# Patient Record
Sex: FEMALE | Race: BLACK OR AFRICAN AMERICAN | Employment: OTHER | URBAN - METROPOLITAN AREA
[De-identification: names, ages, dates, MRNs, and addresses within clinical notes are randomized per-mention and may not be internally consistent; named-entity substitution may affect disease eponyms.]

---

## 2019-07-26 ENCOUNTER — OFFICE VISIT (OUTPATIENT)
Dept: FAMILY MEDICINE CLINIC | Age: 64
End: 2019-07-26

## 2019-07-26 VITALS
TEMPERATURE: 97.6 F | OXYGEN SATURATION: 98 % | DIASTOLIC BLOOD PRESSURE: 81 MMHG | HEART RATE: 89 BPM | SYSTOLIC BLOOD PRESSURE: 147 MMHG | RESPIRATION RATE: 22 BRPM | BODY MASS INDEX: 43.23 KG/M2 | HEIGHT: 63 IN | WEIGHT: 244 LBS

## 2019-07-26 DIAGNOSIS — Z13.1 SCREENING FOR DIABETES MELLITUS: ICD-10-CM

## 2019-07-26 DIAGNOSIS — J45.20 MILD INTERMITTENT ASTHMA WITHOUT COMPLICATION: Primary | ICD-10-CM

## 2019-07-26 DIAGNOSIS — L83 ACANTHOSIS NIGRICANS: ICD-10-CM

## 2019-07-26 DIAGNOSIS — E11.65 TYPE 2 DIABETES MELLITUS WITH HYPERGLYCEMIA, WITHOUT LONG-TERM CURRENT USE OF INSULIN (HCC): ICD-10-CM

## 2019-07-26 DIAGNOSIS — E78.2 MIXED HYPERLIPIDEMIA: ICD-10-CM

## 2019-07-26 DIAGNOSIS — E66.01 OBESITY, CLASS III, BMI 40-49.9 (MORBID OBESITY) (HCC): ICD-10-CM

## 2019-07-26 LAB — HBA1C MFR BLD HPLC: 7.2 %

## 2019-07-26 RX ORDER — ALBUTEROL SULFATE 90 UG/1
2 AEROSOL, METERED RESPIRATORY (INHALATION)
Qty: 1 INHALER | Refills: 5 | Status: SHIPPED | OUTPATIENT
Start: 2019-07-26 | End: 2020-07-20

## 2019-07-26 RX ORDER — ROSUVASTATIN CALCIUM 5 MG/1
5 TABLET, COATED ORAL
Qty: 90 TAB | Refills: 1 | Status: SHIPPED | OUTPATIENT
Start: 2019-07-26

## 2019-07-26 RX ORDER — METFORMIN HYDROCHLORIDE 500 MG/1
500 TABLET ORAL 2 TIMES DAILY WITH MEALS
Qty: 60 TAB | Refills: 2 | Status: SHIPPED | OUTPATIENT
Start: 2019-07-26

## 2019-07-26 NOTE — PROGRESS NOTES
Chief Complaint   Patient presents with   [address]     PCP     she is a 58y.o. year old female who presents for evalution. She is here to establish care    She has a h/o asthma, she has episodes of wheezing once a month- but she denies having asthma  She says she had a TIA, she takes 2 aspirin a day as a result  She had trouble with statins except crestor  crestor was denied by insurance and she never got back on a statin  She has a CXR that shows plaque in the aorta  Also since kvng had the TIA there is plaque in the brain as well\she has refused to use statins in the past    Reviewed PmHx, RxHx, FmHx, SocHx, AllgHx and updated and dated in the chart.     Aspirin yes ____   No____ N/A____    Patient Active Problem List    Diagnosis    Obesity, morbid ([facility] [address]       Nurse notes were reviewed and copied and are correct  Review of Systems - negative except as listed above in the HPI    Objective:     Vitals:    07/26/19 0935 07/26/19 1025 07/26/19 1026   BP: 162/86 147/81 147/81   Pulse: 89     Resp: 22     Temp: 97.6 °F (36.4 °C)     TempSrc: Oral     SpO2: 98%     Weight: 244 lb (110.7 kg)     Height: 5' 2.5\" (1.588 m)         Physical Examination: General appearance - alert, well appearing, and in no distress  Mental status - alert, oriented to person, place, and time  Eyes - pupils equal and reactive, extraocular eye movements intact  Ears - bilateral TM's and external ear canals normal  Mouth - mucous membranes moist, pharynx normal without lesions  Neck - supple, no significant adenopathy  Chest - clear to auscultation, no wheezes, rales or rhonchi, symmetric air entry  Heart - normal rate, regular rhythm, normal S1, S2, no murmurs, rubs, clicks or gallops  Abdomen - soft, nontender, nondistended, no masses or organomegaly  Musculoskeletal - no joint tenderness, deformity or swelling  Extremities - peripheral pulses normal, no pedal edema, no clubbing or cyanosis  Skin - normal coloration and turgor, no rashes, no suspicious skin lesions noted      Assessment/ Plan:   Diagnoses and all orders for this visit:    1. Mild intermittent asthma without complication  -     albuterol (PROVENTIL HFA, VENTOLIN HFA, PROAIR HFA) 90 mcg/actuation inhaler; Take 2 Puffs by inhalation every four (4) hours as needed for Wheezing for up to 360 days. She is resistant to the diagnosis of astma although the history she gives of recurrent wheeezing reversed by bronchodilators  2. Obesity, Class III, BMI 40-49.9 (morbid obesity) ([facility])  -     METABOLIC PANEL, COMPREHENSIVE  I counseled for more than 50% of this more than 50 min vasit  3. Screening for diabetes mellitus  -     AMB POC HEMOGLOBIN A1C    4. Acanthosis nigricans  -     AMB POC HEMOGLOBIN A1C    5. Mixed hyperlipidemia  -     LIPID PANEL  -     rosuvastatin (CRESTOR) 5 mg tablet; Take 1 Tab by mouth nightly. 6. Type 2 diabetes mellitus with hyperglycemia, without long-term current use of insulin ([facility])  -     metFORMIN (GLUCOPHAGE) 500 mg tablet; Take 1 Tab by mouth two (2) times daily (with meals). After getting the result of a1c she admits that  ewas told in the past she needed metformin but she never took it  She agrees to take it now  Other orders  -     CVD REPORT     also exercise 150 mins a week  Avoid sweets and starches  Recheck after vacation  She was told in the past to take metformin but she does not acknowlede any knowledge of having diabetes    Follow-up and Dispositions    · Return in about 2 weeks (around 8/9/2019). ICD-10-CM ICD-9-CM    1. Mild intermittent asthma without complication J35.15 831.03 albuterol (PROVENTIL HFA, VENTOLIN HFA, PROAIR HFA) 90 mcg/actuation inhaler   2. Obesity, Class III, BMI 40-49.9 (morbid obesity) ([facility]) R75.30 343.56 METABOLIC PANEL, COMPREHENSIVE   3. Screening for diabetes mellitus Z13.1 V77.1 AMB POC HEMOGLOBIN A1C   4. Acanthosis nigricans L83 701.2 AMB POC HEMOGLOBIN A1C   5.  Mixed hyperlipidemia E78.2 272.2 LIPID PANEL      rosuvastatin (CRESTOR) 5 mg tablet   6. Type 2 diabetes mellitus with hyperglycemia, without long-term current use of insulin (HCC) E11.65 250.00 metFORMIN (GLUCOPHAGE) 500 mg tablet     790.29        I have discussed the diagnosis with the patient and the intended plan as seen in the above orders. The patient has received an after-visit summary and questions were answered concerning future plans. Medication Side Effects and Warnings were discussed with patient: yes  Patient Labs were reviewed and or requested: yes  Patient Past Records were reviewed and or requested: yes        There are no Patient Instructions on file for this visit.     The patient verbalizes understanding and agrees with the plan of care        Patient has the advanced directives booklet to review

## 2019-07-26 NOTE — PROGRESS NOTES
1. Have you been to the ER, urgent care clinic since your last visit? Hospitalized since your last visit? No    2. Have you seen or consulted any other health care providers outside of the [address] [place] [address] since your last visit? Include any pap smears or colon screening.  PCP      Chief Complaint   Patient presents with   [address]     PCP

## 2019-07-27 LAB
ALBUMIN SERPL-MCNC: 4.3 G/DL (ref 3.6–4.8)
ALBUMIN/GLOB SERPL: 1.3 {RATIO} (ref 1.2–2.2)
ALP SERPL-CCNC: 174 IU/L (ref 39–117)
ALT SERPL-CCNC: 25 IU/L (ref 0–32)
AST SERPL-CCNC: 18 IU/L (ref 0–40)
BILIRUB SERPL-MCNC: 0.2 MG/DL (ref 0–1.2)
BUN SERPL-MCNC: 10 MG/DL (ref 8–27)
BUN/CREAT SERPL: 13 (ref 12–28)
CALCIUM SERPL-MCNC: 9.7 MG/DL (ref 8.7–10.3)
CHLORIDE SERPL-SCNC: 99 MMOL/L (ref 96–106)
CHOLEST SERPL-MCNC: 271 MG/DL (ref 100–199)
CO2 SERPL-SCNC: 26 MMOL/L (ref 20–29)
CREAT SERPL-MCNC: 0.75 MG/DL (ref 0.57–1)
GLOBULIN SER CALC-MCNC: 3.3 G/DL (ref 1.5–4.5)
GLUCOSE SERPL-MCNC: 151 MG/DL (ref 65–99)
HDLC SERPL-MCNC: 45 MG/DL
INTERPRETATION, 910389: NORMAL
LDLC SERPL CALC-MCNC: 203 MG/DL (ref 0–99)
POTASSIUM SERPL-SCNC: 4.8 MMOL/L (ref 3.5–5.2)
PROT SERPL-MCNC: 7.6 G/DL (ref 6–8.5)
SODIUM SERPL-SCNC: 139 MMOL/L (ref 134–144)
TRIGL SERPL-MCNC: 116 MG/DL (ref 0–149)
VLDLC SERPL CALC-MCNC: 23 MG/DL (ref 5–40)

## 2019-08-28 ENCOUNTER — OFFICE VISIT (OUTPATIENT)
Dept: FAMILY MEDICINE CLINIC | Age: 64
End: 2019-08-28

## 2019-08-28 VITALS
HEIGHT: 63 IN | DIASTOLIC BLOOD PRESSURE: 82 MMHG | HEART RATE: 93 BPM | RESPIRATION RATE: 19 BRPM | TEMPERATURE: 97.9 F | OXYGEN SATURATION: 96 % | WEIGHT: 232.9 LBS | BODY MASS INDEX: 41.27 KG/M2 | SYSTOLIC BLOOD PRESSURE: 136 MMHG

## 2019-08-28 DIAGNOSIS — E78.2 MIXED HYPERLIPIDEMIA: ICD-10-CM

## 2019-08-28 DIAGNOSIS — J45.20 MILD INTERMITTENT ASTHMA WITHOUT COMPLICATION: ICD-10-CM

## 2019-08-28 DIAGNOSIS — J45.20 MILD INTERMITTENT ASTHMA WITHOUT COMPLICATION: Primary | ICD-10-CM

## 2019-08-28 DIAGNOSIS — E11.65 TYPE 2 DIABETES MELLITUS WITH HYPERGLYCEMIA, WITHOUT LONG-TERM CURRENT USE OF INSULIN (HCC): ICD-10-CM

## 2019-08-28 DIAGNOSIS — E66.01 OBESITY, CLASS III, BMI 40-49.9 (MORBID OBESITY) (HCC): ICD-10-CM

## 2019-08-28 RX ORDER — ACETAMINOPHEN 325 MG/1
TABLET ORAL
COMMUNITY

## 2019-08-28 RX ORDER — ALBUTEROL SULFATE 90 UG/1
2 AEROSOL, METERED RESPIRATORY (INHALATION)
Qty: 1 INHALER | Refills: 5 | OUTPATIENT
Start: 2019-08-28 | End: 2020-08-22

## 2019-08-28 NOTE — PROGRESS NOTES
Weight Loss Progress Note: Initial Physician Visit      Tavon Kramer is a 71 y.o. female with BMI   who is here for her Initial Evaluation for the medical bariatric care. CC: I want to be healthier    She has been going to [facility] and exercise on bbike 3 days a week  She is also a diabetic, a1c a month ago was 7.2  Taking metformin now          Weight History  Current weight  232and BMI is Body mass index is 41.92 kg/m². Goal weight bmi 29  Highest weight 244  (See weight gain time line scanned into media section of chart)    Weight loss History  How many weight loss attempts have you had? Which program were you most successful doing? Significant Medical History    Have you ever taken appetite suppressants? no   If yes: Rx or OTC? If yes; Any negative side effects? Ever diagnosed with sleep apnea or put on CPAP no    Ever had bariatric surgery: no    Pregnant or planning on becoming pregnant w/in 6 months: no        Significant Psychosocial History   Any history of drug abuse or dependence: no  Current Major Lifestyle Changes: no  Any potential unsupportive people: no      History of binge eating disorder or anorexia : no   If yes, are you currently being treated no    Social History  Social History     Tobacco Use    Smoking status: Former Smoker     Types: Cigarettes     Last attempt to quit: 7/2/2004     Years since quitting: 15.1    Smokeless tobacco: Never Used   Substance Use Topics    Alcohol use: No     How many times a week do you eat out?  0    Do you drink any EtOH?  no   If so, how much? Do you have upcoming any travel in the next 6 weeks?  no   If so, what do you have planned? Exercise  How many days a week do you currently exercise?   3 days  Have you ever been told by a physician not to exercise?  no      Objective  Visit Vitals  /81   Pulse 93   Temp 97.9 °F (36.6 °C) (Oral)   Resp 19   Ht 5' 2.5\" (1.588 m)   Wt 232 lb 14.4 oz (105.6 kg)   SpO2 96%   BMI 41.92 kg/m²       Waist Circumference: See Weight Management Doc Flowsheet  Neck Circumference: See Weight Management Doc Flowsheet  Percent Body Fat: See Weight Management Doc Flowsheet  Weight Metrics 8/28/2019 8/28/2019 7/26/2019 12/14/2015 7/10/2012 6/6/2012 4/4/2011   Weight - 232 lb 14.4 oz 244 lb 220 lb 194 lb 192 lb 210 lb   Neck Circ (inches) 16 - - - - - -   Waist Measure Inches 48 - - - - - -   BMI - 41.92 kg/m2 43.92 kg/m2 39.57 kg/m2 34.9 kg/m2 35.11 kg/m2 38.40 kg/m2         Labs:     Review of Systems  Complete ROS negative except where noted above      Physical Exam    Vital Signs Reviewed  Weight Management Metrics Reviewed    Vital Signs Reviewed  Appearance: Obese, A&O x 3, NAD  HEENT:  NC/AT, EOMI, PERRL, No scleral icterus, malampatti score:  Skin:    Skin tags - yes   Acanthosis Nigricans - no  Neck:  No nodes, thyromegaly no  Heart:  RRR without M/R/G  Lungs:  CTAB, no rhonchi, rales, or wheezes with good air exchange   Abdomen:  Non-tender, pos bowel sounds; hepatomegaly - no  Ext:  No C/C/E        Assessment & Plan  Diagnoses and all orders for this visit:    1. Mild intermittent asthma without complication  stable  2. Obesity, Class III, BMI 40-49.9 (morbid obesity) (HCC)  Diet Plan: given LCD low carb      Activity Plan: cont 3 days of cycling     Medication Plan no changes  3. Mixed hyperlipidemia  Cont the crestor, taking it every other day. Daily doses makes her back hurt  4. Type 2 diabetes mellitus with hyperglycemia, without long-term current use of insulin (HCC)  She is getting a glucose monitor.  She says she can get it covered by [employer] and does not need a RX      Based on his history and exam, Cyrus Ortiz is a good candidate for the Non-[facility] Weight Loss Program           Patient Instructions   LOW ROSANNE DIET     Drink 64 ounces of water each day  Exercise at least 150 mins a week    Breakfast  Either a premier protein meal replacement( 30 g of protein) or 2 boiled eggs  And 1 piece of fruit( apple, orange, banana, grapefruit or pear)      Lunch  Either a premier protein drink (30 G ) or 3 ounces of lean meat and 2 servings of any leafy green vegetables. Can also have as 1 option for vegetables perez beans or green peas. AND 1 piece of fruit as listed above    Snack: premier protein drink    Dinner  4 ounces of lean meat with 2 servings of vegetables ( as listed above)  Also may have a small-medium baked sweet potato    For tea or coffee use stevia sweetener            Over 50% of the 30 minutes face to face time with Vaughn Darnell consisted of counseling & coordinating and/or discussing treatment plans in reference to her obesity The primary encounter diagnosis was Mild intermittent asthma without complication. Diagnoses of Obesity, Class III, BMI 40-49.9 (morbid obesity) ([address] Mixed hyperlipidemia, and Type 2 diabetes mellitus with hyperglycemia, without long-term current use of insulin ([address] were also pertinent to this visit.

## 2019-08-28 NOTE — PATIENT INSTRUCTIONS
LOW ROSANNE DIET     Drink 64 ounces of water each day  Exercise at least 150 mins a week    Breakfast  Either a premier protein meal replacement( 30 g of protein) or 2 boiled eggs  And 1 piece of fruit( apple, orange, banana, grapefruit or pear)      Lunch  Either a premier protein drink (30 G ) or 3 ounces of lean meat and 2 servings of any leafy green vegetables. Can also have as 1 option for vegetables perez beans or green peas.   AND 1 piece of fruit as listed above    Snack: premier protein drink    Dinner  4 ounces of lean meat with 2 servings of vegetables ( as listed above)  Also may have a small-medium baked sweet potato    For tea or coffee use stevia sweetener

## 2019-08-28 NOTE — PROGRESS NOTES
1. Have you been to the ER, urgent care clinic since your last visit? Hospitalized since your last visit? ER in [facility] for back pain. 2. Have you seen or consulted any other health care providers outside of the [address] [place] [address] since your last visit? Include any pap smears or colon screening.  No     Chief Complaint   Patient presents with    Weight Management     Body Weight: 232.9  Body Fat%: 47.1  Muscle Mass Weight: 41.9  Body Water Weight: 96.6  Basal Metabolic Rate: 3508  BMI: 41.92

## 2019-08-30 RX ORDER — PHENOL/SODIUM PHENOLATE
20 AEROSOL, SPRAY (ML) MUCOUS MEMBRANE DAILY
Qty: 90 TAB | Refills: 3 | Status: SHIPPED | OUTPATIENT
Start: 2019-08-30

## 2019-09-26 ENCOUNTER — TELEPHONE (OUTPATIENT)
Dept: FAMILY MEDICINE CLINIC | Age: 64
End: 2019-09-26

## 2019-10-30 ENCOUNTER — OFFICE VISIT (OUTPATIENT)
Dept: FAMILY MEDICINE CLINIC | Age: 64
End: 2019-10-30

## 2019-10-30 VITALS
DIASTOLIC BLOOD PRESSURE: 72 MMHG | OXYGEN SATURATION: 97 % | HEIGHT: 63 IN | BODY MASS INDEX: 40.08 KG/M2 | TEMPERATURE: 97.7 F | RESPIRATION RATE: 20 BRPM | SYSTOLIC BLOOD PRESSURE: 122 MMHG | WEIGHT: 226.2 LBS | HEART RATE: 80 BPM

## 2019-10-30 DIAGNOSIS — Z11.59 NEED FOR HEPATITIS C SCREENING TEST: ICD-10-CM

## 2019-10-30 DIAGNOSIS — Z23 ENCOUNTER FOR IMMUNIZATION: ICD-10-CM

## 2019-10-30 DIAGNOSIS — E11.65 TYPE 2 DIABETES MELLITUS WITH HYPERGLYCEMIA, WITHOUT LONG-TERM CURRENT USE OF INSULIN (HCC): Primary | ICD-10-CM

## 2019-10-30 DIAGNOSIS — E55.9 VITAMIN D DEFICIENCY: ICD-10-CM

## 2019-10-30 DIAGNOSIS — E66.01 OBESITY, CLASS III, BMI 40-49.9 (MORBID OBESITY) (HCC): ICD-10-CM

## 2019-10-30 DIAGNOSIS — E78.2 MIXED HYPERLIPIDEMIA: ICD-10-CM

## 2019-10-30 NOTE — PROGRESS NOTES
1. Have you been to the ER, urgent care clinic since your last visit? Hospitalized since your last visit? No    2. Have you seen or consulted any other health care providers outside of the [address] [place] [address] since your last visit? Include any pap smears or colon screening.  Theapist    Chief Complaint   Patient presents with    Weight Management     Body Weight: 226.2  Body Fat%: 46.5  Muscle Mass Weight: 40.7  Body Water Weight: 60.8  Basal Metabolic Rate: 1096  BMI: 40.71

## 2019-10-30 NOTE — PROGRESS NOTES
Chief Complaint   Patient presents with    Weight Management     she is a 71y.o. year old female who presents for evalution. Here actually for the diabetes check  She also says her back hurts and thinks she needs to see ottho    She has reduced th sweet drinks. She has also decreased other sweets      Reviewed PmHx, RxHx, FmHx, SocHx, AllgHx and updated and dated in the chart. Aspirin yes ____   No____ N/A____    Patient Active Problem List    Diagnosis    Obesity, morbid ([facility] [address]       Nurse notes were reviewed and copied and are correct  Review of Systems - negative except as listed above in the HPI    Objective:     Vitals:    10/30/19 1105   BP: 122/72   Pulse: 80   Resp: 20   Temp: 97.7 °F (36.5 °C)   TempSrc: Oral   SpO2: 97%   Weight: 226 lb 3.2 oz (102.6 kg)   Height: 5' 2.5\" (1.588 m)       Physical Examination: General appearance - alert, well appearing, and in no distress and oriented to person, place, and time  Mental status - alert, oriented to person, place, and time  Chest - clear to auscultation, no wheezes, rales or rhonchi, symmetric air entry  Heart - normal rate, regular rhythm, normal S1, S2, no murmurs, rubs, clicks or gallops  Musculoskeletal - no joint tenderness, deformity or swelling  Extremities - peripheral pulses normal, no pedal edema, no clubbing or cyanosis      Assessment/ Plan:   Diagnoses and all orders for this visit:    1. Type 2 diabetes mellitus with hyperglycemia, without long-term current use of insulin ([facility])  -     HEMOGLOBIN A1C WITH EAG  -     MICROALBUMIN, UR, RAND W/ MICROALB/CREAT RATIO  -      DIABETES FOOT EXAM  -     METABOLIC PANEL, COMPREHENSIVE    2. Mixed hyperlipidemia  -     METABOLIC PANEL, COMPREHENSIVE  -     LIPID PANEL    3. Obesity, Class III, BMI 40-49.9 (morbid obesity) ([facility])  -     METABOLIC PANEL, COMPREHENSIVE    4.  Encounter for immunization  -     PNEUMOCOCCAL POLYSACCHARIDE VACCINE, 23-VALENT, ADULT OR IMMUNOSUPPRESSED PT DOSE,    5. Need for hepatitis C screening test  -     HEPATITIS C AB       Follow-up and Dispositions    · Return in about 3 months (around 1/30/2020). ICD-10-CM ICD-9-CM    1. Type 2 diabetes mellitus with hyperglycemia, without long-term current use of insulin (HCC) E11.65 250.00 HEMOGLOBIN A1C WITH EAG     790.29 MICROALBUMIN, UR, RAND W/ MICROALB/CREAT RATIO      HM DIABETES FOOT EXAM      METABOLIC PANEL, COMPREHENSIVE   2. Mixed hyperlipidemia Y23.1 198.7 METABOLIC PANEL, COMPREHENSIVE      LIPID PANEL   3. Obesity, Class III, BMI 40-49.9 (morbid obesity) (HCC) Z82.25 149.59 METABOLIC PANEL, COMPREHENSIVE   4. Encounter for immunization Z23 V03.89 PNEUMOCOCCAL POLYSACCHARIDE VACCINE, 23-VALENT, ADULT OR IMMUNOSUPPRESSED PT DOSE,   5. Need for hepatitis C screening test Z11.59 V73.89 HEPATITIS C AB       I have discussed the diagnosis with the patient and the intended plan as seen in the above orders. The patient has received an after-visit summary and questions were answered concerning future plans. Medication Side Effects and Warnings were discussed with patient: yes  Patient Labs were reviewed and or requested: yes  Patient Past Records were reviewed and or requested: yes        There are no Patient Instructions on file for this visit.     The patient verbalizes understanding and agrees with the plan of care        Patient has the advanced directives booklet to review

## 2019-10-31 LAB
25(OH)D3+25(OH)D2 SERPL-MCNC: 16.3 NG/ML (ref 30–100)
ALBUMIN SERPL-MCNC: 4.6 G/DL (ref 3.6–4.8)
ALBUMIN/CREAT UR: 2.5 MG/G CREAT (ref 0–30)
ALBUMIN/GLOB SERPL: 1.5 {RATIO} (ref 1.2–2.2)
ALP SERPL-CCNC: 152 IU/L (ref 39–117)
ALT SERPL-CCNC: 13 IU/L (ref 0–32)
AST SERPL-CCNC: 15 IU/L (ref 0–40)
BILIRUB SERPL-MCNC: 0.2 MG/DL (ref 0–1.2)
BUN SERPL-MCNC: 10 MG/DL (ref 8–27)
BUN/CREAT SERPL: 15 (ref 12–28)
CALCIUM SERPL-MCNC: 9.8 MG/DL (ref 8.7–10.3)
CHLORIDE SERPL-SCNC: 102 MMOL/L (ref 96–106)
CHOLEST SERPL-MCNC: 258 MG/DL (ref 100–199)
CO2 SERPL-SCNC: 25 MMOL/L (ref 20–29)
COMMENT, 011824: ABNORMAL
CREAT SERPL-MCNC: 0.68 MG/DL (ref 0.57–1)
CREAT UR-MCNC: 132.9 MG/DL
EST. AVERAGE GLUCOSE BLD GHB EST-MCNC: 148 MG/DL
GLOBULIN SER CALC-MCNC: 3 G/DL (ref 1.5–4.5)
GLUCOSE SERPL-MCNC: 121 MG/DL (ref 65–99)
HBA1C MFR BLD: 6.8 % (ref 4.8–5.6)
HCV AB S/CO SERPL IA: <0.1 S/CO RATIO (ref 0–0.9)
HDLC SERPL-MCNC: 46 MG/DL
INTERPRETATION, 910389: NORMAL
LDLC SERPL CALC-MCNC: 190 MG/DL (ref 0–99)
Lab: NORMAL
Lab: NORMAL
MICROALBUMIN UR-MCNC: 3.3 UG/ML
POTASSIUM SERPL-SCNC: 4.2 MMOL/L (ref 3.5–5.2)
PROT SERPL-MCNC: 7.6 G/DL (ref 6–8.5)
SODIUM SERPL-SCNC: 141 MMOL/L (ref 134–144)
TRIGL SERPL-MCNC: 111 MG/DL (ref 0–149)
VLDLC SERPL CALC-MCNC: 22 MG/DL (ref 5–40)

## 2019-11-15 RX ORDER — ERGOCALCIFEROL 1.25 MG/1
50000 CAPSULE ORAL
Qty: 12 CAP | Refills: 0 | Status: SHIPPED | OUTPATIENT
Start: 2019-11-15 | End: 2020-02-01

## 2019-11-16 NOTE — PROGRESS NOTES
The vit D is very low- I am sending a prescription to the pharmacy  The liver is better, the kidney is normal  The cholesterol is better  The blood sugar control test is better.  Continue taking the metformin for now

## 2019-11-22 NOTE — PROGRESS NOTES
Received RC from pt. Pt states that she does not know why Veronika called. States that she already saw her results on MyChart. Pt states that she also does not want her mobile to be the first number called because she is at home and does not look at her mobile when she is home. Pt also mentioned that she asked for a referral to derm but did not get one so she called her insurance and was given one in network. States that she was given generic kenalog to use on her face and that her skin is clearing up along with her weight loss. No further action needed at this time.

## 2022-03-19 PROBLEM — E66.01 OBESITY, MORBID (HCC): Status: ACTIVE | Noted: 2019-07-26

## 2024-04-04 ENCOUNTER — APPOINTMENT (OUTPATIENT)
Facility: HOSPITAL | Age: 69
End: 2024-04-04
Payer: MEDICARE

## 2024-04-04 ENCOUNTER — HOSPITAL ENCOUNTER (EMERGENCY)
Facility: HOSPITAL | Age: 69
Discharge: HOME | End: 2024-04-04
Attending: EMERGENCY MEDICINE
Payer: MEDICARE

## 2024-04-04 VITALS
DIASTOLIC BLOOD PRESSURE: 89 MMHG | HEIGHT: 63 IN | SYSTOLIC BLOOD PRESSURE: 159 MMHG | OXYGEN SATURATION: 100 % | BODY MASS INDEX: 39.16 KG/M2 | HEART RATE: 85 BPM | WEIGHT: 221 LBS | RESPIRATION RATE: 14 BRPM | TEMPERATURE: 99.3 F

## 2024-04-04 DIAGNOSIS — D50.9 MICROCYTIC ANEMIA: Primary | ICD-10-CM

## 2024-04-04 LAB
ALBUMIN SERPL-MCNC: 3.7 G/DL (ref 3.5–5)
ALBUMIN/GLOB SERPL: 0.9 (ref 1.1–2.2)
ALP SERPL-CCNC: 148 U/L (ref 45–117)
ALT SERPL-CCNC: 16 U/L (ref 12–78)
ANION GAP SERPL CALC-SCNC: 4 MMOL/L (ref 5–15)
AST SERPL W P-5'-P-CCNC: 13 U/L (ref 15–37)
BASOPHILS # BLD: 0 K/UL (ref 0–0.1)
BASOPHILS NFR BLD: 0 % (ref 0–1)
BILIRUB SERPL-MCNC: <0.1 MG/DL (ref 0.2–1)
BUN SERPL-MCNC: 10 MG/DL (ref 6–20)
BUN/CREAT SERPL: 12 (ref 12–20)
CA-I BLD-MCNC: 9.7 MG/DL (ref 8.5–10.1)
CHLORIDE SERPL-SCNC: 110 MMOL/L (ref 97–108)
CO2 SERPL-SCNC: 24 MMOL/L (ref 21–32)
COLLECT DATE STL: NORMAL
CREAT SERPL-MCNC: 0.81 MG/DL (ref 0.55–1.02)
DIFFERENTIAL METHOD BLD: ABNORMAL
EOSINOPHIL # BLD: 0 K/UL (ref 0–0.4)
EOSINOPHIL NFR BLD: 0 % (ref 0–7)
ERYTHROCYTE [DISTWIDTH] IN BLOOD BY AUTOMATED COUNT: 16.6 % (ref 11.5–14.5)
GLOBULIN SER CALC-MCNC: 4.2 G/DL (ref 2–4)
GLUCOSE SERPL-MCNC: 170 MG/DL (ref 65–100)
HCT VFR BLD AUTO: 22.6 % (ref 35–47)
HEMOCCULT SP1 STL QL: NEGATIVE
HGB BLD-MCNC: 6.3 G/DL (ref 11.5–16)
IMM GRANULOCYTES # BLD AUTO: 0.1 K/UL (ref 0–0.04)
IMM GRANULOCYTES NFR BLD AUTO: 1 % (ref 0–0.5)
LYMPHOCYTES # BLD: 1.3 K/UL (ref 0.8–3.5)
LYMPHOCYTES NFR BLD: 17 % (ref 12–49)
MAGNESIUM SERPL-MCNC: 2.2 MG/DL (ref 1.6–2.4)
MCH RBC QN AUTO: 20.9 PG (ref 26–34)
MCHC RBC AUTO-ENTMCNC: 27.9 G/DL (ref 30–36.5)
MCV RBC AUTO: 75.1 FL (ref 80–99)
MONOCYTES # BLD: 0.3 K/UL (ref 0–1)
MONOCYTES NFR BLD: 4 % (ref 5–13)
NEUTS SEG # BLD: 5.9 K/UL (ref 1.8–8)
NEUTS SEG NFR BLD: 78 % (ref 32–75)
NRBC # BLD: 0 K/UL (ref 0–0.01)
NRBC BLD-RTO: 0 PER 100 WBC
PLATELET # BLD AUTO: 515 K/UL (ref 150–400)
PMV BLD AUTO: 9.5 FL (ref 8.9–12.9)
POTASSIUM SERPL-SCNC: 3.8 MMOL/L (ref 3.5–5.1)
PROT SERPL-MCNC: 7.9 G/DL (ref 6.4–8.2)
RBC # BLD AUTO: 3.01 M/UL (ref 3.8–5.2)
RBC MORPH BLD: ABNORMAL
SODIUM SERPL-SCNC: 138 MMOL/L (ref 136–145)
TROPONIN I SERPL HS-MCNC: 11 NG/L (ref 0–51)
WBC # BLD AUTO: 7.6 K/UL (ref 3.6–11)

## 2024-04-04 PROCEDURE — 84484 ASSAY OF TROPONIN QUANT: CPT

## 2024-04-04 PROCEDURE — 86901 BLOOD TYPING SEROLOGIC RH(D): CPT

## 2024-04-04 PROCEDURE — 86923 COMPATIBILITY TEST ELECTRIC: CPT

## 2024-04-04 PROCEDURE — 36430 TRANSFUSION BLD/BLD COMPNT: CPT

## 2024-04-04 PROCEDURE — 80053 COMPREHEN METABOLIC PANEL: CPT

## 2024-04-04 PROCEDURE — 85025 COMPLETE CBC W/AUTO DIFF WBC: CPT

## 2024-04-04 PROCEDURE — 86900 BLOOD TYPING SEROLOGIC ABO: CPT

## 2024-04-04 PROCEDURE — 82272 OCCULT BLD FECES 1-3 TESTS: CPT

## 2024-04-04 PROCEDURE — 99285 EMERGENCY DEPT VISIT HI MDM: CPT

## 2024-04-04 PROCEDURE — 86850 RBC ANTIBODY SCREEN: CPT

## 2024-04-04 PROCEDURE — 93005 ELECTROCARDIOGRAM TRACING: CPT | Performed by: EMERGENCY MEDICINE

## 2024-04-04 PROCEDURE — P9016 RBC LEUKOCYTES REDUCED: HCPCS

## 2024-04-04 PROCEDURE — 36415 COLL VENOUS BLD VENIPUNCTURE: CPT

## 2024-04-04 PROCEDURE — 83735 ASSAY OF MAGNESIUM: CPT

## 2024-04-04 PROCEDURE — 71045 X-RAY EXAM CHEST 1 VIEW: CPT

## 2024-04-04 RX ORDER — SODIUM CHLORIDE 9 MG/ML
INJECTION, SOLUTION INTRAVENOUS PRN
Status: DISCONTINUED | OUTPATIENT
Start: 2024-04-04 | End: 2024-04-04 | Stop reason: HOSPADM

## 2024-04-04 RX ORDER — DOCUSATE SODIUM 100 MG/1
100 CAPSULE, LIQUID FILLED ORAL 2 TIMES DAILY
Qty: 28 CAPSULE | Refills: 0 | Status: SHIPPED | OUTPATIENT
Start: 2024-04-04 | End: 2024-04-18

## 2024-04-04 RX ORDER — PNV NO.95/FERROUS FUM/FOLIC AC 28MG-0.8MG
325 TABLET ORAL DAILY
Qty: 30 TABLET | Refills: 2 | Status: SHIPPED | OUTPATIENT
Start: 2024-04-04 | End: 2024-05-04

## 2024-04-04 ASSESSMENT — PAIN SCALES - GENERAL
PAINLEVEL_OUTOF10: 0

## 2024-04-04 ASSESSMENT — LIFESTYLE VARIABLES
HOW OFTEN DO YOU HAVE A DRINK CONTAINING ALCOHOL: NEVER
HOW MANY STANDARD DRINKS CONTAINING ALCOHOL DO YOU HAVE ON A TYPICAL DAY: PATIENT DOES NOT DRINK

## 2024-04-04 ASSESSMENT — PAIN - FUNCTIONAL ASSESSMENT: PAIN_FUNCTIONAL_ASSESSMENT: NONE - DENIES PAIN

## 2024-04-04 NOTE — ED PROVIDER NOTES
findings:   {[facility] Imaging Interpretation:36769::\"Not applicable.\"}    Interpretation per the Radiologist below, if available at the time of this note:  XR CHEST PORTABLE    (Results Pending)      EMERGENCY DEPARTMENT COURSE and DIFFERENTIAL DIAGNOSIS/MDM   CC/HPI Summary: Anemia  Ddx: Microcytic anemia, macrocytic anemia, GI bleed, hemolytic anemia, peptic ulcer disease, diverticulosis, anemia of chronic disease  ED Course and Reassessment:   I have ordered basic labs to assess hemoglobin.  I did rectal exam which did not show any gross bloody stool.  This will be sent for testing for blood.  She likely will need blood transfusion and possible admission.    ***    Sepsis Reassessment: {[facility] Sepsis Reassessment:59890::\"Not applicable\"}    Disposition Considerations: {[facility] Dispo Considerations:30535::\"Not applicable\"}    Additional ED Course       Clinical Management Tools:  {[facility] List:74762::\"Not Applicable\"}    Records Reviewed (source and summary of external notes): Prior medical records and Nursing notes    Vitals:    Vitals:    04/04/24 0929   BP: (!) 192/92   Pulse: (!) 112   Resp: 18   Temp: 98.6 °F (37 °C)   SpO2: 100%   Weight: 100.2 kg (221 lb)   Height: 1.588 m (5' 2.5\")        Patient was given the following medications:  Medications - No data to display    CONSULTS: (Who and What was discussed)  {[facility] Consult:03402::\"No consults. \"}    Social Determinants affecting Dx or Tx: {[facility] Social:18959::\"None\"}    PROCEDURES   Procedures   {[facility] Procedure:56336::\"No procedures. \"}    Smoking Cessation: {[facility] Smoking Cessation:94757::\"None.\"}    CRITICAL CARE TIME     {[facility] Critical Care:93587::\"Patient care does not meet critical care criteria. \"}    DISPOSITION/PLAN   DISPOSITION        {[facility] Dispo:76719::\"Discharged.\"}     PATIENT REFERRED TO:  No follow-up provider specified.    DISCHARGE MEDICATIONS:     Medication List      You have not been prescribed any medications.         DISCONTINUED MEDICATIONS:  There are no

## 2024-04-04 NOTE — DISCHARGE INSTRUCTIONS
Agueda Filt Rate 79 >60 ml/min/1.73m2    Calcium 9.7 8.5 - 10.1 mg/dL    Total Bilirubin <0.1 (L) 0.2 - 1.0 mg/dL    AST 13 (L) 15 - 37 U/L    ALT 16 12 - 78 U/L    Alk Phosphatase 148 (H) 45 - 117 U/L    Total Protein 7.9 6.4 - 8.2 g/dL    Albumin 3.7 3.5 - 5.0 g/dL    Globulin 4.2 (H) 2.0 - 4.0 g/dL    Albumin/Globulin Ratio 0.9 (L) 1.1 - 2.2     Magnesium    Collection Time: 04/04/24  9:51 AM   Result Value Ref Range    Magnesium 2.2 1.6 - 2.4 mg/dL   TYPE AND SCREEN    Collection Time: 04/04/24  9:51 AM   Result Value Ref Range    Crossmatch expiration date 04/07/2024,2359     ABO/Rh A Positive     Antibody Screen Negative     Unit Number [id number]     Product Code Blood Bank RC LR     Unit Divison 00     Dispense Status Blood Bank Issued     Unit Issue Date/Time [id number]     Product Code Blood Bank [id number]     Blood Bank Unit Type and Rh A POS     Blood Bank ISBT Product Blood Type 6200     Blood Bank Blood Product Expiration Date [id number]     Transfusion Status Ok to transfuse     Crossmatch Result Compatible    Troponin    Collection Time: 04/04/24  9:51 AM   Result Value Ref Range    Troponin, High Sensitivity 11 0 - 51 ng/L   Occult Blood, Fecal    Collection Time: 04/04/24  9:51 AM   Result Value Ref Range    Occult Blood, Stool #1 Negative Negative      Date 4,042,024         Radiologic Studies  XR CHEST PORTABLE   Final Result   No acute cardiopulmonary disease.           ------------------------------------------------------------------------------------------------------------  The exam and treatment you received in the Emergency Department were for an urgent problem and are not intended as complete care. It is important that you follow-up with a doctor, nurse practitioner, or physician assistant to:  (1) confirm your diagnosis,  (2) re-evaluation of changes in your illness and treatment, and (3) for ongoing care. Please take your discharge instructions with you when you go to your follow-up

## 2024-04-05 LAB
ABO + RH BLD: NORMAL
BLD PROD TYP BPU: NORMAL
BLOOD BANK BLOOD PRODUCT EXPIRATION DATE: NORMAL
BLOOD BANK DISPENSE STATUS: NORMAL
BLOOD BANK ISBT PRODUCT BLOOD TYPE: 6200
BLOOD BANK PRODUCT CODE: NORMAL
BLOOD BANK UNIT TYPE AND RH: NORMAL
BLOOD GROUP ANTIBODIES SERPL: NEGATIVE
BPU ID: NORMAL
CROSSMATCH RESULT: NORMAL
SPECIMEN EXP DATE BLD: NORMAL
TRANSFUSION STATUS PATIENT QL: NORMAL
UNIT DIVISION: 0
UNIT ISSUE DATE/TIME: NORMAL

## 2024-04-06 LAB
EKG ATRIAL RATE: 97 BPM
EKG DIAGNOSIS: NORMAL
EKG P AXIS: 60 DEGREES
EKG P-R INTERVAL: 154 MS
EKG Q-T INTERVAL: 374 MS
EKG QRS DURATION: 88 MS
EKG QTC CALCULATION (BAZETT): 474 MS
EKG R AXIS: -11 DEGREES
EKG T AXIS: 73 DEGREES
EKG VENTRICULAR RATE: 97 BPM

## 2025-03-18 ENCOUNTER — APPOINTMENT (OUTPATIENT)
Facility: HOSPITAL | Age: 70
End: 2025-03-18
Payer: MEDICARE

## 2025-03-18 ENCOUNTER — HOSPITAL ENCOUNTER (INPATIENT)
Facility: HOSPITAL | Age: 70
LOS: 4 days | Discharge: HOME HEALTH | End: 2025-03-22
Attending: EMERGENCY MEDICINE
Payer: MEDICARE

## 2025-03-18 DIAGNOSIS — I10 HYPERTENSION, UNSPECIFIED TYPE: ICD-10-CM

## 2025-03-18 DIAGNOSIS — J96.01 ACUTE RESPIRATORY FAILURE WITH HYPOXIA: ICD-10-CM

## 2025-03-18 DIAGNOSIS — J18.9 COMMUNITY ACQUIRED PNEUMONIA, UNSPECIFIED LATERALITY: Primary | ICD-10-CM

## 2025-03-18 DIAGNOSIS — C34.90 NON-SMALL CELL LUNG CANCER, UNSPECIFIED LATERALITY (HCC): ICD-10-CM

## 2025-03-18 DIAGNOSIS — R06.00 DYSPNEA, UNSPECIFIED TYPE: ICD-10-CM

## 2025-03-18 DIAGNOSIS — I42.9 CARDIOMYOPATHY, UNSPECIFIED TYPE (HCC): ICD-10-CM

## 2025-03-18 PROBLEM — A41.9 SEPSIS (HCC): Status: ACTIVE | Noted: 2025-03-18

## 2025-03-18 LAB
ALBUMIN SERPL-MCNC: 3.5 G/DL (ref 3.5–5)
ALBUMIN/GLOB SERPL: 0.9 (ref 1.1–2.2)
ALP SERPL-CCNC: 137 U/L (ref 45–117)
ALT SERPL-CCNC: 18 U/L (ref 12–78)
ANION GAP SERPL CALC-SCNC: 12 MMOL/L (ref 2–12)
APPEARANCE UR: CLEAR
AST SERPL W P-5'-P-CCNC: 24 U/L (ref 15–37)
BACTERIA URNS QL MICRO: NEGATIVE /HPF
BASOPHILS # BLD: 0.06 K/UL (ref 0–0.1)
BASOPHILS NFR BLD: 0.7 % (ref 0–1)
BILIRUB SERPL-MCNC: 0.3 MG/DL (ref 0.2–1)
BILIRUB UR QL: NEGATIVE
BUN SERPL-MCNC: 11 MG/DL (ref 6–20)
BUN/CREAT SERPL: 15 (ref 12–20)
CA-I BLD-MCNC: 9.3 MG/DL (ref 8.5–10.1)
CHLORIDE SERPL-SCNC: 105 MMOL/L (ref 97–108)
CO2 SERPL-SCNC: 28 MMOL/L (ref 21–32)
COLOR UR: YELLOW
CREAT SERPL-MCNC: 0.74 MG/DL (ref 0.55–1.02)
DIFFERENTIAL METHOD BLD: ABNORMAL
EKG ATRIAL RATE: 115 BPM
EKG DIAGNOSIS: NORMAL
EKG P AXIS: 60 DEGREES
EKG P-R INTERVAL: 150 MS
EKG Q-T INTERVAL: 356 MS
EKG QRS DURATION: 82 MS
EKG QTC CALCULATION (BAZETT): 492 MS
EKG R AXIS: 17 DEGREES
EKG T AXIS: 80 DEGREES
EKG VENTRICULAR RATE: 115 BPM
EOSINOPHIL # BLD: 0.42 K/UL (ref 0–0.4)
EOSINOPHIL NFR BLD: 4.6 % (ref 0–7)
EPITH CASTS URNS QL MICRO: ABNORMAL /LPF
ERYTHROCYTE [DISTWIDTH] IN BLOOD BY AUTOMATED COUNT: 15.5 % (ref 11.5–14.5)
GLOBULIN SER CALC-MCNC: 3.7 G/DL (ref 2–4)
GLUCOSE SERPL-MCNC: 110 MG/DL (ref 65–100)
GLUCOSE UR STRIP.AUTO-MCNC: NEGATIVE MG/DL
HCT VFR BLD AUTO: 33.2 % (ref 35–47)
HGB BLD-MCNC: 9.9 G/DL (ref 11.5–16)
HGB UR QL STRIP: NEGATIVE
IMM GRANULOCYTES # BLD AUTO: 0.03 K/UL (ref 0–0.04)
IMM GRANULOCYTES NFR BLD AUTO: 0.3 % (ref 0–0.5)
KETONES UR QL STRIP.AUTO: NEGATIVE MG/DL
LACTATE BLD-SCNC: 1.09 MMOL/L (ref 0.4–2)
LEUKOCYTE ESTERASE UR QL STRIP.AUTO: NEGATIVE
LYMPHOCYTES # BLD: 1.54 K/UL (ref 0.8–3.5)
LYMPHOCYTES NFR BLD: 16.7 % (ref 12–49)
MCH RBC QN AUTO: 28.7 PG (ref 26–34)
MCHC RBC AUTO-ENTMCNC: 29.8 G/DL (ref 30–36.5)
MCV RBC AUTO: 96.2 FL (ref 80–99)
MONOCYTES # BLD: 0.39 K/UL (ref 0–1)
MONOCYTES NFR BLD: 4.2 % (ref 5–13)
NEUTS SEG # BLD: 6.76 K/UL (ref 1.8–8)
NEUTS SEG NFR BLD: 73.5 % (ref 32–75)
NITRITE UR QL STRIP.AUTO: NEGATIVE
PERFORMED BY:: NORMAL
PH UR STRIP: 6.5 (ref 5–8)
PLATELET # BLD AUTO: 386 K/UL (ref 150–400)
PMV BLD AUTO: 10.1 FL (ref 8.9–12.9)
POTASSIUM SERPL-SCNC: 3.7 MMOL/L (ref 3.5–5.1)
PROCALCITONIN SERPL-MCNC: <0.05 NG/ML
PROT SERPL-MCNC: 7.2 G/DL (ref 6.4–8.2)
PROT UR STRIP-MCNC: ABNORMAL MG/DL
RBC # BLD AUTO: 3.45 M/UL (ref 3.8–5.2)
RBC #/AREA URNS HPF: ABNORMAL /HPF (ref 0–5)
SODIUM SERPL-SCNC: 145 MMOL/L (ref 136–145)
SP GR UR REFRACTOMETRY: 1.01 (ref 1–1.03)
TROPONIN I SERPL HS-MCNC: 37 NG/L (ref 0–51)
URINE CULTURE IF INDICATED: ABNORMAL
UROBILINOGEN UR QL STRIP.AUTO: 0.1 EU/DL (ref 0.2–1)
WBC # BLD AUTO: 9.2 K/UL (ref 3.6–11)
WBC URNS QL MICRO: ABNORMAL /HPF (ref 0–4)

## 2025-03-18 PROCEDURE — 99285 EMERGENCY DEPT VISIT HI MDM: CPT

## 2025-03-18 PROCEDURE — 71275 CT ANGIOGRAPHY CHEST: CPT

## 2025-03-18 PROCEDURE — 1100000000 HC RM PRIVATE

## 2025-03-18 PROCEDURE — 85025 COMPLETE CBC W/AUTO DIFF WBC: CPT

## 2025-03-18 PROCEDURE — 84484 ASSAY OF TROPONIN QUANT: CPT

## 2025-03-18 PROCEDURE — 87040 BLOOD CULTURE FOR BACTERIA: CPT

## 2025-03-18 PROCEDURE — 6360000004 HC RX CONTRAST MEDICATION: Performed by: EMERGENCY MEDICINE

## 2025-03-18 PROCEDURE — 96375 TX/PRO/DX INJ NEW DRUG ADDON: CPT

## 2025-03-18 PROCEDURE — 83605 ASSAY OF LACTIC ACID: CPT

## 2025-03-18 PROCEDURE — 2500000003 HC RX 250 WO HCPCS: Performed by: EMERGENCY MEDICINE

## 2025-03-18 PROCEDURE — 96365 THER/PROPH/DIAG IV INF INIT: CPT

## 2025-03-18 PROCEDURE — 96374 THER/PROPH/DIAG INJ IV PUSH: CPT

## 2025-03-18 PROCEDURE — 6360000002 HC RX W HCPCS: Performed by: EMERGENCY MEDICINE

## 2025-03-18 PROCEDURE — 36415 COLL VENOUS BLD VENIPUNCTURE: CPT

## 2025-03-18 PROCEDURE — 96361 HYDRATE IV INFUSION ADD-ON: CPT

## 2025-03-18 PROCEDURE — 81001 URINALYSIS AUTO W/SCOPE: CPT

## 2025-03-18 PROCEDURE — 80053 COMPREHEN METABOLIC PANEL: CPT

## 2025-03-18 PROCEDURE — 71045 X-RAY EXAM CHEST 1 VIEW: CPT

## 2025-03-18 PROCEDURE — 2580000003 HC RX 258: Performed by: EMERGENCY MEDICINE

## 2025-03-18 PROCEDURE — 84145 PROCALCITONIN (PCT): CPT

## 2025-03-18 PROCEDURE — 93005 ELECTROCARDIOGRAM TRACING: CPT | Performed by: EMERGENCY MEDICINE

## 2025-03-18 RX ORDER — HYDRALAZINE HYDROCHLORIDE 20 MG/ML
10 INJECTION INTRAMUSCULAR; INTRAVENOUS ONCE
Status: COMPLETED | OUTPATIENT
Start: 2025-03-18 | End: 2025-03-18

## 2025-03-18 RX ORDER — 0.9 % SODIUM CHLORIDE 0.9 %
30 INTRAVENOUS SOLUTION INTRAVENOUS ONCE
Status: DISCONTINUED | OUTPATIENT
Start: 2025-03-18 | End: 2025-03-18

## 2025-03-18 RX ORDER — FUROSEMIDE 10 MG/ML
20 INJECTION INTRAMUSCULAR; INTRAVENOUS ONCE
Status: COMPLETED | OUTPATIENT
Start: 2025-03-18 | End: 2025-03-18

## 2025-03-18 RX ORDER — IOPAMIDOL 755 MG/ML
100 INJECTION, SOLUTION INTRAVASCULAR
Status: COMPLETED | OUTPATIENT
Start: 2025-03-18 | End: 2025-03-18

## 2025-03-18 RX ORDER — 0.9 % SODIUM CHLORIDE 0.9 %
1000 INTRAVENOUS SOLUTION INTRAVENOUS
Status: DISCONTINUED | OUTPATIENT
Start: 2025-03-18 | End: 2025-03-18

## 2025-03-18 RX ORDER — 0.9 % SODIUM CHLORIDE 0.9 %
1000 INTRAVENOUS SOLUTION INTRAVENOUS
Status: COMPLETED | OUTPATIENT
Start: 2025-03-18 | End: 2025-03-18

## 2025-03-18 RX ADMIN — CEFTRIAXONE SODIUM 1000 MG: 1 INJECTION, POWDER, FOR SOLUTION INTRAMUSCULAR; INTRAVENOUS at 18:51

## 2025-03-18 RX ADMIN — HYDRALAZINE HYDROCHLORIDE 10 MG: 20 INJECTION INTRAMUSCULAR; INTRAVENOUS at 19:21

## 2025-03-18 RX ADMIN — AZITHROMYCIN MONOHYDRATE 500 MG: 500 INJECTION, POWDER, LYOPHILIZED, FOR SOLUTION INTRAVENOUS at 18:58

## 2025-03-18 RX ADMIN — IOPAMIDOL 100 ML: 755 INJECTION, SOLUTION INTRAVENOUS at 17:51

## 2025-03-18 RX ADMIN — FUROSEMIDE 20 MG: 10 INJECTION, SOLUTION INTRAMUSCULAR; INTRAVENOUS at 18:47

## 2025-03-18 RX ADMIN — SODIUM CHLORIDE 1000 ML: 0.9 INJECTION, SOLUTION INTRAVENOUS at 16:34

## 2025-03-18 ASSESSMENT — PAIN SCALES - GENERAL: PAINLEVEL_OUTOF10: 0

## 2025-03-18 ASSESSMENT — PAIN - FUNCTIONAL ASSESSMENT: PAIN_FUNCTIONAL_ASSESSMENT: 0-10

## 2025-03-18 ASSESSMENT — LIFESTYLE VARIABLES
HOW MANY STANDARD DRINKS CONTAINING ALCOHOL DO YOU HAVE ON A TYPICAL DAY: PATIENT DOES NOT DRINK
HOW OFTEN DO YOU HAVE A DRINK CONTAINING ALCOHOL: NEVER

## 2025-03-18 NOTE — ED PROVIDER NOTES
[facility] EMERGENCY DEPT  EMERGENCY DEPARTMENT HISTORY AND PHYSICAL EXAM      Date: 3/18/2025  Patient Name: Tasha Kyle  MRN: [id number]  YOB: 1955  Date of evaluation: 3/18/2025  Provider: Becky Song MD   Note Started: 4:10 PM EDT 3/18/25    HISTORY OF PRESENT ILLNESS     Chief Complaint   Patient presents with    Shortness of Breath       History Provided By: Patient    HPI: Tasha Kyle is a 69 y.o. female with recent diagnosis of non-small cell lung cancer, with recent port placement and plans to start chemo this week was sent by Dr Matos, hem onc, for tachycardia, SOB, and right calf swelling for DVT, PE rule out.    PAST MEDICAL HISTORY   Past Medical History:  Past Medical History:   Diagnosis Date    Anxiety     Arthritis     bilateral knees, lower back Deg. disc disease    Asthma     in     Cancer (HCC)     non small cell lung cancer    GERD (gastroesophageal reflux disease)     Neurological disorder     stroke    Other ill-defined conditions(799.89)     high cholesterol    Stroke (HCC) 2004    TIA clot in right temperal artery residual left sided weakness       Past Surgical History:  Past Surgical History:   Procedure Laterality Date    CATARACT REMOVAL      CHOLECYSTECTOMY      OTHER SURGICAL HISTORY      dental (gum) surgery    TONSILLECTOMY      WISDOM TOOTH EXTRACTION  's       Family History:  History reviewed. No pertinent family history.    Social History:  Social History     Tobacco Use    Smoking status: Former     Current packs/day: 0.00     Types: Cigarettes     Quit date: 2004     Years since quittin.7    Smokeless tobacco: Never   Substance Use Topics    Alcohol use: No    Drug use: No       Allergies:  Allergies   Allergen Reactions    Statins Rash       PCP: Liliane Martin MD    Current Meds:   Current Facility-Administered Medications   Medication Dose Route Frequency Provider Last Rate Last Admin    azithromycin  Immature Granulocytes % 0.3 0.0 - 0.5 %    Neutrophils Absolute 6.76 1.80 - 8.00 K/UL    Lymphocytes Absolute 1.54 0.80 - 3.50 K/UL    Monocytes Absolute 0.39 0.00 - 1.00 K/UL    Eosinophils Absolute 0.42 (H) 0.00 - 0.40 K/UL    Basophils Absolute 0.06 0.00 - 0.10 K/UL    Immature Granulocytes Absolute 0.03 0.00 - 0.04 K/UL    Differential Type AUTOMATED     Comprehensive Metabolic Panel    Collection Time: 03/18/25  4:30 PM   Result Value Ref Range    Sodium 145 136 - 145 mmol/L    Potassium 3.7 3.5 - 5.1 mmol/L    Chloride 105 97 - 108 mmol/L    CO2 28 21 - 32 mmol/L    Anion Gap 12 2 - 12 mmol/L    Glucose 110 (H) 65 - 100 mg/dL    BUN 11 6 - 20 mg/dL    Creatinine 0.74 0.55 - 1.02 mg/dL    BUN/Creatinine Ratio 15 12 - 20      Est, Glom Filt Rate 87 >60 ml/min/1.73m2    Calcium 9.3 8.5 - 10.1 mg/dL    Total Bilirubin 0.3 0.2 - 1.0 mg/dL    AST 24 15 - 37 U/L    ALT 18 12 - 78 U/L    Alk Phosphatase 137 (H) 45 - 117 U/L    Total Protein 7.2 6.4 - 8.2 g/dL    Albumin 3.5 3.5 - 5.0 g/dL    Globulin 3.7 2.0 - 4.0 g/dL    Albumin/Globulin Ratio 0.9 (L) 1.1 - 2.2     Troponin    Collection Time: 03/18/25  4:30 PM   Result Value Ref Range    Troponin, High Sensitivity 37 0 - 51 ng/L   POC Lactic Acid    Collection Time: 03/18/25  4:32 PM   Result Value Ref Range    POC Lactic Acid 1.09 0.40 - 2.00 mmol/L    Performed by: Jeferson Meeks    Urinalysis with Reflex to Culture    Collection Time: 03/18/25  5:29 PM    Specimen: Urine   Result Value Ref Range    Color, UA Yellow      Appearance Clear Clear      Specific Gravity, UA 1.015 1.003 - 1.030      pH, Urine 6.5 5.0 - 8.0      Protein, UA Trace (A) Negative mg/dL    Glucose, Ur Negative Negative mg/dL    Ketones, Urine Negative Negative mg/dL    Bilirubin, Urine Negative Negative      Blood, Urine Negative Negative      Urobilinogen, Urine 0.1 (L) 0.2 - 1.0 EU/dL    Nitrite, Urine Negative Negative      Leukocyte Esterase, Urine Negative Negative      WBC, UA 0-4 0 -

## 2025-03-18 NOTE — ED TRIAGE NOTES
Pt reporting SOB for the last week. Pt has non small cell lung cancer. Pt able to speak in full word sentences but has to stop occasionally to breath.     Sent [facility] for DVT/PE ruleout.

## 2025-03-18 NOTE — ED NOTES
Pt ambulated to bathroom but had to sit down to catch her breath. Pt was tripoding In chair. Pt placed on 6lpm nasal cannula and given verbal reassurance. Breathing improved to pt being able to speak in full word sentences.

## 2025-03-19 ENCOUNTER — APPOINTMENT (OUTPATIENT)
Facility: HOSPITAL | Age: 70
End: 2025-03-19
Payer: MEDICARE

## 2025-03-19 PROBLEM — J96.01 ACUTE RESPIRATORY FAILURE WITH HYPOXIA: Status: ACTIVE | Noted: 2025-03-19

## 2025-03-19 LAB
ALBUMIN SERPL-MCNC: 3 G/DL (ref 3.5–5)
ALBUMIN/GLOB SERPL: 0.9 (ref 1.1–2.2)
ALP SERPL-CCNC: 113 U/L (ref 45–117)
ALT SERPL-CCNC: 13 U/L (ref 12–78)
ANION GAP SERPL CALC-SCNC: 6 MMOL/L (ref 2–12)
AST SERPL W P-5'-P-CCNC: 11 U/L (ref 15–37)
BASOPHILS # BLD: 0.06 K/UL (ref 0–0.1)
BASOPHILS NFR BLD: 1 % (ref 0–1)
BILIRUB SERPL-MCNC: 0.2 MG/DL (ref 0.2–1)
BUN SERPL-MCNC: 10 MG/DL (ref 6–20)
BUN/CREAT SERPL: 19 (ref 12–20)
CA-I BLD-MCNC: 9.3 MG/DL (ref 8.5–10.1)
CHLORIDE SERPL-SCNC: 108 MMOL/L (ref 97–108)
CO2 SERPL-SCNC: 29 MMOL/L (ref 21–32)
CREAT SERPL-MCNC: 0.52 MG/DL (ref 0.55–1.02)
DIFFERENTIAL METHOD BLD: ABNORMAL
ECHO AO ASC DIAM: 3.7 CM
ECHO AO ASCENDING AORTA INDEX: 1.93 CM/M2
ECHO AO ROOT DIAM: 2.4 CM
ECHO AO ROOT INDEX: 1.25 CM/M2
ECHO AV AREA PEAK VELOCITY: 1.5 CM2
ECHO AV AREA VTI: 1.5 CM2
ECHO AV AREA/BSA PEAK VELOCITY: 0.8 CM2/M2
ECHO AV AREA/BSA VTI: 0.8 CM2/M2
ECHO AV MEAN GRADIENT: 7 MMHG
ECHO AV MEAN VELOCITY: 1.2 M/S
ECHO AV PEAK GRADIENT: 11 MMHG
ECHO AV PEAK VELOCITY: 1.7 M/S
ECHO AV VELOCITY RATIO: 0.59
ECHO AV VTI: 34 CM
ECHO BSA: 2 M2
ECHO BSA: 2 M2
ECHO EST RA PRESSURE: 3 MMHG
ECHO LA AREA 4C: 23.4 CM2
ECHO LA DIAMETER INDEX: 1.88 CM/M2
ECHO LA DIAMETER: 3.6 CM
ECHO LA MAJOR AXIS: 6.3 CM
ECHO LA TO AORTIC ROOT RATIO: 1.5
ECHO LA VOL MOD A4C: 70 ML (ref 22–52)
ECHO LA VOLUME INDEX MOD A4C: 36 ML/M2 (ref 16–34)
ECHO LV E' LATERAL VELOCITY: 7.62 CM/S
ECHO LV E' SEPTAL VELOCITY: 6.15 CM/S
ECHO LV EDV A4C: 135 ML
ECHO LV EDV INDEX A4C: 70 ML/M2
ECHO LV EF PHYSICIAN: 55 %
ECHO LV EJECTION FRACTION A4C: 50 %
ECHO LV ESV A4C: 67 ML
ECHO LV ESV INDEX A4C: 35 ML/M2
ECHO LV FRACTIONAL SHORTENING: 20 % (ref 28–44)
ECHO LV INTERNAL DIMENSION DIASTOLE INDEX: 2.08 CM/M2
ECHO LV INTERNAL DIMENSION DIASTOLIC: 4 CM (ref 3.9–5.3)
ECHO LV INTERNAL DIMENSION SYSTOLIC INDEX: 1.67 CM/M2
ECHO LV INTERNAL DIMENSION SYSTOLIC: 3.2 CM
ECHO LV IVSD: 1.1 CM (ref 0.6–0.9)
ECHO LV MASS 2D: 155.4 G (ref 67–162)
ECHO LV MASS INDEX 2D: 80.9 G/M2 (ref 43–95)
ECHO LV POSTERIOR WALL DIASTOLIC: 1.2 CM (ref 0.6–0.9)
ECHO LV RELATIVE WALL THICKNESS RATIO: 0.6
ECHO LVOT AREA: 2.5 CM2
ECHO LVOT AV VTI INDEX: 0.59
ECHO LVOT DIAM: 1.8 CM
ECHO LVOT MEAN GRADIENT: 2 MMHG
ECHO LVOT PEAK GRADIENT: 4 MMHG
ECHO LVOT PEAK VELOCITY: 1 M/S
ECHO LVOT STROKE VOLUME INDEX: 26.5 ML/M2
ECHO LVOT SV: 50.9 ML
ECHO LVOT VTI: 20 CM
ECHO MV A VELOCITY: 1.11 M/S
ECHO MV AREA VTI: 1.1 CM2
ECHO MV E DECELERATION TIME (DT): 227 MS
ECHO MV E VELOCITY: 1.03 M/S
ECHO MV E/A RATIO: 0.93
ECHO MV E/E' LATERAL: 13.52
ECHO MV E/E' RATIO (AVERAGED): 15.13
ECHO MV E/E' SEPTAL: 16.75
ECHO MV LVOT VTI INDEX: 2.34
ECHO MV MAX VELOCITY: 1.5 M/S
ECHO MV MEAN GRADIENT: 6 MMHG
ECHO MV MEAN VELOCITY: 1.2 M/S
ECHO MV PEAK GRADIENT: 9 MMHG
ECHO MV REGURGITANT PEAK GRADIENT: 46 MMHG
ECHO MV REGURGITANT PEAK VELOCITY: 3.4 M/S
ECHO MV VTI: 46.7 CM
ECHO PV MAX VELOCITY: 1 M/S
ECHO PV MEAN GRADIENT: 2 MMHG
ECHO PV MEAN VELOCITY: 0.8 M/S
ECHO PV PEAK GRADIENT: 4 MMHG
ECHO PV VTI: 18.5 CM
ECHO RIGHT VENTRICULAR SYSTOLIC PRESSURE (RVSP): 25 MMHG
ECHO RV FREE WALL PEAK S': 12.9 CM/S
ECHO RV TAPSE: 1.7 CM (ref 1.7–?)
ECHO TV REGURGITANT MAX VELOCITY: 2.37 M/S
ECHO TV REGURGITANT PEAK GRADIENT: 22 MMHG
EOSINOPHIL # BLD: 0.49 K/UL (ref 0–0.4)
EOSINOPHIL NFR BLD: 7.8 % (ref 0–7)
ERYTHROCYTE [DISTWIDTH] IN BLOOD BY AUTOMATED COUNT: 15.7 % (ref 11.5–14.5)
FLUAV RNA SPEC QL NAA+PROBE: NOT DETECTED
FLUBV RNA SPEC QL NAA+PROBE: NOT DETECTED
GLOBULIN SER CALC-MCNC: 3.5 G/DL (ref 2–4)
GLUCOSE SERPL-MCNC: 96 MG/DL (ref 65–100)
HCT VFR BLD AUTO: 29.2 % (ref 35–47)
HGB BLD-MCNC: 8.9 G/DL (ref 11.5–16)
IMM GRANULOCYTES # BLD AUTO: 0.01 K/UL (ref 0–0.04)
IMM GRANULOCYTES NFR BLD AUTO: 0.2 % (ref 0–0.5)
LYMPHOCYTES # BLD: 1.39 K/UL (ref 0.8–3.5)
LYMPHOCYTES NFR BLD: 22.1 % (ref 12–49)
MCH RBC QN AUTO: 28.8 PG (ref 26–34)
MCHC RBC AUTO-ENTMCNC: 30.5 G/DL (ref 30–36.5)
MCV RBC AUTO: 94.5 FL (ref 80–99)
MONOCYTES # BLD: 0.42 K/UL (ref 0–1)
MONOCYTES NFR BLD: 6.7 % (ref 5–13)
NEUTS SEG # BLD: 3.92 K/UL (ref 1.8–8)
NEUTS SEG NFR BLD: 62.2 % (ref 32–75)
NRBC # BLD: 0 K/UL (ref 0–0.01)
NRBC BLD-RTO: 0 PER 100 WBC
PLATELET # BLD AUTO: 323 K/UL (ref 150–400)
PMV BLD AUTO: 10.2 FL (ref 8.9–12.9)
POTASSIUM SERPL-SCNC: 2.8 MMOL/L (ref 3.5–5.1)
POTASSIUM SERPL-SCNC: 3.9 MMOL/L (ref 3.5–5.1)
PROCALCITONIN SERPL-MCNC: <0.05 NG/ML
PROT SERPL-MCNC: 6.5 G/DL (ref 6.4–8.2)
RBC # BLD AUTO: 3.09 M/UL (ref 3.8–5.2)
SARS-COV-2 RNA RESP QL NAA+PROBE: NOT DETECTED
SODIUM SERPL-SCNC: 143 MMOL/L (ref 136–145)
WBC # BLD AUTO: 6.3 K/UL (ref 3.6–11)

## 2025-03-19 PROCEDURE — 84145 PROCALCITONIN (PCT): CPT

## 2025-03-19 PROCEDURE — 93970 EXTREMITY STUDY: CPT

## 2025-03-19 PROCEDURE — 6360000002 HC RX W HCPCS: Performed by: PHYSICIAN ASSISTANT

## 2025-03-19 PROCEDURE — 36415 COLL VENOUS BLD VENIPUNCTURE: CPT

## 2025-03-19 PROCEDURE — 2500000003 HC RX 250 WO HCPCS: Performed by: PHYSICIAN ASSISTANT

## 2025-03-19 PROCEDURE — 94761 N-INVAS EAR/PLS OXIMETRY MLT: CPT

## 2025-03-19 PROCEDURE — 86738 MYCOPLASMA ANTIBODY: CPT

## 2025-03-19 PROCEDURE — 80053 COMPREHEN METABOLIC PANEL: CPT

## 2025-03-19 PROCEDURE — 84132 ASSAY OF SERUM POTASSIUM: CPT

## 2025-03-19 PROCEDURE — 94640 AIRWAY INHALATION TREATMENT: CPT

## 2025-03-19 PROCEDURE — 1100000000 HC RM PRIVATE

## 2025-03-19 PROCEDURE — 6370000000 HC RX 637 (ALT 250 FOR IP): Performed by: PHYSICIAN ASSISTANT

## 2025-03-19 PROCEDURE — 87636 SARSCOV2 & INF A&B AMP PRB: CPT

## 2025-03-19 PROCEDURE — 2700000000 HC OXYGEN THERAPY PER DAY

## 2025-03-19 PROCEDURE — 6360000002 HC RX W HCPCS: Performed by: INTERNAL MEDICINE

## 2025-03-19 PROCEDURE — 93306 TTE W/DOPPLER COMPLETE: CPT

## 2025-03-19 PROCEDURE — 2580000003 HC RX 258: Performed by: PHYSICIAN ASSISTANT

## 2025-03-19 PROCEDURE — 85025 COMPLETE CBC W/AUTO DIFF WBC: CPT

## 2025-03-19 PROCEDURE — 6370000000 HC RX 637 (ALT 250 FOR IP): Performed by: INTERNAL MEDICINE

## 2025-03-19 RX ORDER — IPRATROPIUM BROMIDE AND ALBUTEROL SULFATE 2.5; .5 MG/3ML; MG/3ML
1 SOLUTION RESPIRATORY (INHALATION)
Status: DISCONTINUED | OUTPATIENT
Start: 2025-03-19 | End: 2025-03-22 | Stop reason: HOSPADM

## 2025-03-19 RX ORDER — ACETAMINOPHEN 325 MG/1
650 TABLET ORAL EVERY 6 HOURS PRN
Status: DISCONTINUED | OUTPATIENT
Start: 2025-03-19 | End: 2025-03-22 | Stop reason: HOSPADM

## 2025-03-19 RX ORDER — ASPIRIN 81 MG/1
162 TABLET ORAL DAILY
COMMUNITY

## 2025-03-19 RX ORDER — SODIUM CHLORIDE 0.9 % (FLUSH) 0.9 %
5-40 SYRINGE (ML) INJECTION PRN
Status: DISCONTINUED | OUTPATIENT
Start: 2025-03-19 | End: 2025-03-22 | Stop reason: HOSPADM

## 2025-03-19 RX ORDER — BUDESONIDE AND FORMOTEROL FUMARATE DIHYDRATE 160; 4.5 UG/1; UG/1
2 AEROSOL RESPIRATORY (INHALATION)
Status: DISCONTINUED | OUTPATIENT
Start: 2025-03-19 | End: 2025-03-22 | Stop reason: HOSPADM

## 2025-03-19 RX ORDER — ALPRAZOLAM 0.5 MG
0.5 TABLET ORAL 3 TIMES DAILY PRN
Status: DISCONTINUED | OUTPATIENT
Start: 2025-03-19 | End: 2025-03-21

## 2025-03-19 RX ORDER — ONDANSETRON 2 MG/ML
4 INJECTION INTRAMUSCULAR; INTRAVENOUS EVERY 6 HOURS PRN
Status: DISCONTINUED | OUTPATIENT
Start: 2025-03-19 | End: 2025-03-22 | Stop reason: HOSPADM

## 2025-03-19 RX ORDER — POLYETHYLENE GLYCOL 3350 17 G/17G
17 POWDER, FOR SOLUTION ORAL DAILY PRN
Status: DISCONTINUED | OUTPATIENT
Start: 2025-03-19 | End: 2025-03-22 | Stop reason: HOSPADM

## 2025-03-19 RX ORDER — POTASSIUM CHLORIDE 1500 MG/1
40 TABLET, EXTENDED RELEASE ORAL PRN
Status: DISCONTINUED | OUTPATIENT
Start: 2025-03-19 | End: 2025-03-22 | Stop reason: HOSPADM

## 2025-03-19 RX ORDER — SODIUM CHLORIDE 9 MG/ML
INJECTION, SOLUTION INTRAVENOUS PRN
Status: DISCONTINUED | OUTPATIENT
Start: 2025-03-19 | End: 2025-03-22 | Stop reason: HOSPADM

## 2025-03-19 RX ORDER — FERROUS SULFATE 325(65) MG
325 TABLET ORAL DAILY
Status: DISCONTINUED | OUTPATIENT
Start: 2025-03-20 | End: 2025-03-22 | Stop reason: HOSPADM

## 2025-03-19 RX ORDER — POTASSIUM CHLORIDE 7.45 MG/ML
10 INJECTION INTRAVENOUS PRN
Status: DISCONTINUED | OUTPATIENT
Start: 2025-03-19 | End: 2025-03-22 | Stop reason: HOSPADM

## 2025-03-19 RX ORDER — MAGNESIUM SULFATE IN WATER 40 MG/ML
2000 INJECTION, SOLUTION INTRAVENOUS PRN
Status: DISCONTINUED | OUTPATIENT
Start: 2025-03-19 | End: 2025-03-22 | Stop reason: HOSPADM

## 2025-03-19 RX ORDER — ENOXAPARIN SODIUM 100 MG/ML
40 INJECTION SUBCUTANEOUS DAILY
Status: DISCONTINUED | OUTPATIENT
Start: 2025-03-19 | End: 2025-03-22 | Stop reason: HOSPADM

## 2025-03-19 RX ORDER — ASPIRIN 81 MG/1
162 TABLET ORAL DAILY
Status: DISCONTINUED | OUTPATIENT
Start: 2025-03-19 | End: 2025-03-22 | Stop reason: HOSPADM

## 2025-03-19 RX ORDER — SODIUM CHLORIDE 0.9 % (FLUSH) 0.9 %
5-40 SYRINGE (ML) INJECTION EVERY 12 HOURS SCHEDULED
Status: DISCONTINUED | OUTPATIENT
Start: 2025-03-19 | End: 2025-03-22 | Stop reason: HOSPADM

## 2025-03-19 RX ORDER — FUROSEMIDE 10 MG/ML
40 INJECTION INTRAMUSCULAR; INTRAVENOUS 2 TIMES DAILY
Status: DISCONTINUED | OUTPATIENT
Start: 2025-03-19 | End: 2025-03-22

## 2025-03-19 RX ORDER — ONDANSETRON 4 MG/1
4 TABLET, ORALLY DISINTEGRATING ORAL EVERY 8 HOURS PRN
Status: DISCONTINUED | OUTPATIENT
Start: 2025-03-19 | End: 2025-03-22 | Stop reason: HOSPADM

## 2025-03-19 RX ORDER — ACETAMINOPHEN 650 MG/1
650 SUPPOSITORY RECTAL EVERY 6 HOURS PRN
Status: DISCONTINUED | OUTPATIENT
Start: 2025-03-19 | End: 2025-03-22 | Stop reason: HOSPADM

## 2025-03-19 RX ORDER — ALPRAZOLAM 0.5 MG
0.5 TABLET ORAL 3 TIMES DAILY PRN
COMMUNITY

## 2025-03-19 RX ORDER — ZOLPIDEM TARTRATE 10 MG/1
10 TABLET ORAL NIGHTLY PRN
COMMUNITY

## 2025-03-19 RX ORDER — METHYLPREDNISOLONE SODIUM SUCCINATE 40 MG/ML
40 INJECTION INTRAMUSCULAR; INTRAVENOUS EVERY 8 HOURS
Status: COMPLETED | OUTPATIENT
Start: 2025-03-19 | End: 2025-03-20

## 2025-03-19 RX ORDER — ZOLPIDEM TARTRATE 5 MG/1
10 TABLET ORAL NIGHTLY PRN
Status: DISCONTINUED | OUTPATIENT
Start: 2025-03-19 | End: 2025-03-22 | Stop reason: HOSPADM

## 2025-03-19 RX ADMIN — POTASSIUM CHLORIDE 40 MEQ: 1500 TABLET, EXTENDED RELEASE ORAL at 13:05

## 2025-03-19 RX ADMIN — AZITHROMYCIN MONOHYDRATE 500 MG: 500 INJECTION, POWDER, LYOPHILIZED, FOR SOLUTION INTRAVENOUS at 19:40

## 2025-03-19 RX ADMIN — FUROSEMIDE 40 MG: 10 INJECTION, SOLUTION INTRAMUSCULAR; INTRAVENOUS at 18:04

## 2025-03-19 RX ADMIN — IPRATROPIUM BROMIDE AND ALBUTEROL SULFATE 1 DOSE: 2.5; .5 SOLUTION RESPIRATORY (INHALATION) at 21:48

## 2025-03-19 RX ADMIN — CEFTRIAXONE SODIUM 1000 MG: 1 INJECTION, POWDER, FOR SOLUTION INTRAMUSCULAR; INTRAVENOUS at 18:01

## 2025-03-19 RX ADMIN — SODIUM CHLORIDE, PRESERVATIVE FREE 10 ML: 5 INJECTION INTRAVENOUS at 20:49

## 2025-03-19 RX ADMIN — ZOLPIDEM TARTRATE 10 MG: 5 TABLET, FILM COATED ORAL at 01:39

## 2025-03-19 RX ADMIN — METHYLPREDNISOLONE SODIUM SUCCINATE 40 MG: 40 INJECTION INTRAMUSCULAR; INTRAVENOUS at 18:05

## 2025-03-19 RX ADMIN — ALPRAZOLAM 0.5 MG: 0.5 TABLET ORAL at 20:45

## 2025-03-19 RX ADMIN — FUROSEMIDE 40 MG: 10 INJECTION, SOLUTION INTRAMUSCULAR; INTRAVENOUS at 08:36

## 2025-03-19 RX ADMIN — METHYLPREDNISOLONE SODIUM SUCCINATE 40 MG: 40 INJECTION INTRAMUSCULAR; INTRAVENOUS at 12:02

## 2025-03-19 RX ADMIN — ALPRAZOLAM 0.5 MG: 0.5 TABLET ORAL at 01:39

## 2025-03-19 RX ADMIN — IPRATROPIUM BROMIDE AND ALBUTEROL SULFATE 1 DOSE: 2.5; .5 SOLUTION RESPIRATORY (INHALATION) at 14:53

## 2025-03-19 RX ADMIN — ENOXAPARIN SODIUM 40 MG: 100 INJECTION SUBCUTANEOUS at 08:37

## 2025-03-19 RX ADMIN — POTASSIUM BICARBONATE 40 MEQ: 782 TABLET, EFFERVESCENT ORAL at 13:09

## 2025-03-19 RX ADMIN — ZOLPIDEM TARTRATE 10 MG: 5 TABLET, FILM COATED ORAL at 20:45

## 2025-03-19 RX ADMIN — ASPIRIN 162 MG: 81 TABLET, COATED ORAL at 12:03

## 2025-03-19 RX ADMIN — SODIUM CHLORIDE, PRESERVATIVE FREE 10 ML: 5 INJECTION INTRAVENOUS at 09:00

## 2025-03-19 ASSESSMENT — PAIN SCALES - GENERAL
PAINLEVEL_OUTOF10: 0
PAINLEVEL_OUTOF10: 0

## 2025-03-19 ASSESSMENT — ENCOUNTER SYMPTOMS
GASTROINTESTINAL NEGATIVE: 1
SHORTNESS OF BREATH: 1
COUGH: 1

## 2025-03-19 NOTE — PROGRESS NOTES
Received Order for Telemetry     Tasha Kyle   1955   [id number]   Non-small cell lung cancer, unspecified laterality (HCC) [C34.90]  Sepsis (HCC) [A41.9]  Pneumonia due to infectious organism [J18.9]  Dyspnea, unspecified type [R06.00]  Hypertension, unspecified type [I10]  Community acquired pneumonia, unspecified laterality [J18.9]  Acute respiratory failure with hypoxia (HCC) [J96.01]   Elvira Jacobo MD     Tele Box # [id number] placed on patient at  0033 am  ER Room # FSED  Admitting to Room 210  Transferring Nurse N/A  Verified with Primary Nurse Izzy at  0034 am

## 2025-03-19 NOTE — PLAN OF CARE
Problem: Chronic Conditions and Co-morbidities  Goal: Patient's chronic conditions and co-morbidity symptoms are monitored and maintained or improved  Outcome: Progressing     Problem: Discharge Planning  Goal: Discharge to home or other facility with appropriate resources  Outcome: Progressing     Problem: Pain  Goal: Verbalizes/displays adequate comfort level or baseline comfort level  Outcome: Progressing     Problem: Neurosensory - Adult  Goal: Achieves stable or improved neurological status  Outcome: Progressing  Goal: Achieves maximal functionality and self care  Outcome: Progressing     Problem: Respiratory - Adult  Goal: Achieves optimal ventilation and oxygenation  Outcome: Progressing     Problem: Cardiovascular - Adult  Goal: Maintains optimal cardiac output and hemodynamic stability  Outcome: Progressing  Goal: Absence of cardiac dysrhythmias or at baseline  Outcome: Progressing     Problem: Skin/Tissue Integrity - Adult  Goal: Skin integrity remains intact  Outcome: Progressing  Goal: Oral mucous membranes remain intact  Outcome: Progressing     Problem: Musculoskeletal - Adult  Goal: Return mobility to safest level of function  Outcome: Progressing  Goal: Return ADL status to a safe level of function  Outcome: Progressing     Problem: Gastrointestinal - Adult  Goal: Maintains or returns to baseline bowel function  Outcome: Progressing  Goal: Maintains adequate nutritional intake  Outcome: Progressing     Problem: Genitourinary - Adult  Goal: Absence of urinary retention  Outcome: Progressing     Problem: Infection - Adult  Goal: Absence of infection at discharge  Outcome: Progressing     Problem: Metabolic/Fluid and Electrolytes - Adult  Goal: Electrolytes maintained within normal limits  Outcome: Progressing  Goal: Hemodynamic stability and optimal renal function maintained  Outcome: Progressing  Goal: Glucose maintained within prescribed range  Outcome: Progressing     Problem: Hematologic -

## 2025-03-19 NOTE — PROGRESS NOTES
4 Eyes Skin Assessment     NAME:  Tasha Kyle  YOB: 1955  MEDICAL RECORD NUMBER:  [id number]    The patient is being assessed for  Admission    I agree that at least one RN has performed a thorough Head to Toe Skin Assessment on the patient. ALL assessment sites listed below have been assessed.      Areas assessed by both nurses:    Head, Face, Ears, Shoulders, Back, Chest, Arms, Elbows, Hands, Sacrum. Buttock, Coccyx, Ischium, Legs. Feet and Heels, and Under Medical Devices         Does the Patient have a Wound? No noted wound(s)       Ruperto Prevention initiated by RN: Yes  Wound Care Orders initiated by RN: No    Pressure Injury (Stage 3,4, Unstageable, DTI, NWPT, and Complex wounds) if present, place Wound referral order by RN under : No    New Ostomies, if present place, Ostomy referral order under : No     Nurse 1 eSignature: Electronically signed by Izzy Jensen RN on 3/19/25 at 5:06 AM EDT    **SHARE this note so that the co-signing nurse can place an eSignature**    Nurse 2 eSignature: Electronically signed by SALAS PALACIOS RN on 3/19/25 at 5:09 AM EDT

## 2025-03-19 NOTE — CONSULTS
IMPRESSION:   Acute hypoxic respiratory failure  Non-small cell lung cancer right upper lobe lung mass  Community-acquired pneumonia  Acute pulmonary edema  COPD exacerbation  Asthma by history  Hypokalemia  Right leg swelling rule out DVT  History of anxiety  Additional workup outlined below  Pt is at high risk of sudden decline and decompensation with life threatening consequenses and continued end organ dysfunction and failure  Pt is critically ill. Time spent with pt and staff actively rendering care, managing pt and coordinating care as stated below; 55 minutes, exclusive of any procedures      RECOMMENDATIONS/PLAN:     69-year-old lady came in because of shortness of breath and dyspnea she was recently diagnosed with non-small cell lung cancer [facility] after biopsy was done at [facility] and she was supposed to get chemotherapy but her condition got worse came to the hospital she had significant past medical history of asthma as a child her and she use inhalers at home she was a ex-smoker quit smoking 20 years ago pack per day she came to the hospital she was hypoxic put on oxygen 6 L nasal cannula she was on room air not on oxygen at home CAT scan of the chest was done negative for pulmonary emboli and patient has right upper lobe lung mass and also shows pulmonary edema  Agree with Empiric IV antibiotics pending culture results   On Rocephin and Zithromax  Replace potassium  Will give 3 doses of IV Solu-Medrol  Start patient on nebulizer treatment Symbicort inhaler  Was on oxygen 6 L nasal Cannula now she is on 1-1/2 L oxygen as salvage oxygen delivery device to provide high concentration of oxygen to overcome refractory hypoxia;   Transfuse prn to maintain Hgb > 7  Labs to follow electrolytes, renal function and and blood counts  Bronchial hygiene with respiratory therapy techniques, bronchodilators  Pt needs IV fluids with additives and Drug therapy requiring intensive monitoring for  of critical care time rendering care exclusive of any procedures. During this entire length of time the patient's condition was unstable, unpredictable and critically ill I was immediately available to the patient whose care required several interactions with nursing, multidisciplinary team members leading to multiple interventions with fluid resuscitation and medication adjustments to optimize respiratory support, hemodynamic treatment, medication changes based on repeat labs results, reviews, exams and assessments. The reason for providing this level of medical care was due to a critical illness that impaired one or more vital organ systems, such that there was a high probability of sudden or life threatening deterioration in the patient's condition.

## 2025-03-19 NOTE — PLAN OF CARE
Problem: Respiratory - Adult  Goal: Achieves optimal ventilation and oxygenation  3/19/2025 1416 by Maribel Harley RN  Outcome: Not Progressing  Flowsheets (Taken 3/19/2025 0840)  Achieves optimal ventilation and oxygenation:   Assess for changes in respiratory status   Assess for changes in mentation and behavior   Position to facilitate oxygenation and minimize respiratory effort  3/19/2025 0438 by Angi Jensen RN  Outcome: Progressing     Problem: Chronic Conditions and Co-morbidities  Goal: Patient's chronic conditions and co-morbidity symptoms are monitored and maintained or improved  3/19/2025 1416 by Maribel Harley RN  Outcome: Progressing  Flowsheets (Taken 3/19/2025 0840)  Care Plan - Patient's Chronic Conditions and Co-Morbidity Symptoms are Monitored and Maintained or Improved: Monitor and assess patient's chronic conditions and comorbid symptoms for stability, deterioration, or improvement  3/19/2025 0438 by Angi eJnsen RN  Outcome: Progressing     Problem: Discharge Planning  Goal: Discharge to home or other facility with appropriate resources  3/19/2025 1416 by Maribel Hraley RN  Outcome: Progressing  Flowsheets (Taken 3/19/2025 0840)  Discharge to home or other facility with appropriate resources: Identify barriers to discharge with patient and caregiver  3/19/2025 0438 by Angi Jensen RN  Outcome: Progressing     Problem: Pain  Goal: Verbalizes/displays adequate comfort level or baseline comfort level  3/19/2025 1416 by Maribel Harley RN  Outcome: Progressing  3/19/2025 0438 by Angi Jensen RN  Outcome: Progressing     Problem: Neurosensory - Adult  Goal: Achieves stable or improved neurological status  3/19/2025 1416 by Maribel Harley RN  Outcome: Progressing  Flowsheets (Taken 3/19/2025 0840)  Achieves stable or improved neurological status: Assess for and report changes in neurological status  3/19/2025 0438 by Angi Jensen

## 2025-03-19 NOTE — ED NOTES
TRANSFER - OUT REPORT:    Verbal report given to ALYSON Whitley on Tasha Kyle  being transferred to  for routine progression of patient care       Report consisted of patient's Situation, Background, Assessment and   Recommendations(SBAR).     Information from the following report(s) Nurse Handoff Report, ED Encounter Summary, and ED SBAR was reviewed with the receiving nurse.    [place] Fall Assessment:    Presents to emergency department  because of falls (Syncope, seizure, or loss of consciousness): No  Age > 70: No  Altered Mental Status, Intoxication with alcohol or substance confusion (Disorientation, impaired judgment, poor safety awaremess, or inability to follow instructions): No  Impaired Mobility: Ambulates or transfers with assistive devices or assistance; Unable to ambulate or transer.: No  Nursing Judgement: No          Lines:   Peripheral IV 03/18/25 Right Antecubital (Active)       Peripheral IV 03/18/25 Left;Anterior Forearm (Active)        Opportunity for questions and clarification was provided.      Patient transported with:  O2 @ 2lpm

## 2025-03-19 NOTE — H&P
pulmonary  embolism to the proximal segmental arterial level. There is no pulmonary  embolism to this level. Evaluation is limited by motion.    No axillary or supraclavicular adenopathy. Left chest wall Port-A-Cath  MEDIASTINUM: There is confluent soft tissue in the pretracheal region extending  around the bilateral mainstem bronchi with some narrowing of the bronchi  present.  JILLIAN: No mass or lymphadenopathy.  THORACIC AORTA: No aneurysm.  HEART: Normal in size.  ESOPHAGUS: No wall thickening or dilatation.  TRACHEA/BRONCHI: Patent.  PLEURA: Small bilateral pleural effusions  LUNGS: Probable background pulmonary edema. Multifocal airspace opacities with a  more confluent airspace opacity or mass in the right upper lobe measuring 2.6 x  1.9 cm  UPPER ABDOMEN: Simple cyst left kidney  BONES: No aggressive bone lesion or fracture.    Impression  1. Limited study. No evidence of pulmonary embolus.    2. Right upper lobe masslike opacity. Recommend correlation with history. There  is confluent soft tissue in the subcarinal region extending around the bilateral  bronchi which are narrowed which may represent malignancy or treated  lymphadenopathy. No prior studies are available for comparison    3. Probable pulmonary edema and airspace opacities which may reflect  superimposed infection      Electronically signed by Robert Turk        Xray Result (most recent):  XR CHEST PORTABLE 03/18/2025    Narrative  INDICATION:  SIRS    Exam: Portable chest 1618.    Comparison: 4/4/2024.    Findings: Cardiomediastinal silhouette is within normal limits. Pulmonary  vasculature is not engorged. Diffuse ill-defined airspace disease with a basilar  predominance. Interval placement of a left subclavian port with its tip at the  cavoatrial junction. No pneumothorax or pleural effusion.    Impression  1. Ill-defined bibasilar airspace disease        Electronically signed by Lesly  Bonnie        _______________________________________________________________________    TOTAL TIME:  70 Minutes    Critical Care Provided     Minutes non procedure based    Signed: Paco Alejandra PA-C    Procedures: see electronic medical records for all procedures/Xrays and details which were not copied into this note but were reviewed prior to creation of Plan.

## 2025-03-19 NOTE — PROGRESS NOTES
Hospitalist Progress Note               Daily Progress Note: 3/19/2025      Hospital Day: 2     Chief complaint:   Chief Complaint   Patient presents with    Shortness of Breath        Subjective:     Patient is seen today for follow-up. Patient seen examined at bedside. Patient's nieces at bedside.    Patient is on 2 L nasal cannula, starting hundred percent. Blood pressure elevated 160s, 70s/80s mmHg.  Patient denies any chest pain, shortness of breath, palpitations, nausea/vomiting.      Medications reviewed  Current Facility-Administered Medications   Medication Dose Route Frequency    ALPRAZolam (XANAX) tablet 0.5 mg  0.5 mg Oral TID PRN    [START ON 3/20/2025] ferrous sulfate (IRON 325) tablet 325 mg  325 mg Oral Daily    zolpidem (AMBIEN) tablet 10 mg  10 mg Oral Nightly PRN    sodium chloride flush 0.9 % injection 5-40 mL  5-40 mL IntraVENous 2 times per day    sodium chloride flush 0.9 % injection 5-40 mL  5-40 mL IntraVENous PRN    0.9 % sodium chloride infusion   IntraVENous PRN    potassium chloride (KLOR-CON M) extended release tablet 40 mEq  40 mEq Oral PRN    Or    potassium bicarb-citric acid (EFFER-K) effervescent tablet 40 mEq  40 mEq Oral PRN    Or    potassium chloride 10 mEq/100 mL IVPB (Peripheral Line)  10 mEq IntraVENous PRN    magnesium sulfate 2000 mg in 50 mL IVPB premix  2,000 mg IntraVENous PRN    enoxaparin (LOVENOX) injection 40 mg  40 mg SubCUTAneous Daily    ondansetron (ZOFRAN-ODT) disintegrating tablet 4 mg  4 mg Oral Q8H PRN    Or    ondansetron (ZOFRAN) injection 4 mg  4 mg IntraVENous Q6H PRN    polyethylene glycol (GLYCOLAX) packet 17 g  17 g Oral Daily PRN    acetaminophen (TYLENOL) tablet 650 mg  650 mg Oral Q6H PRN    Or    acetaminophen (TYLENOL) suppository 650 mg  650 mg Rectal Q6H PRN    cefTRIAXone (ROCEPHIN) 1,000 mg in sterile water 10 mL IV syringe  1,000 mg IntraVENous Q24H    azithromycin (ZITHROMAX) 500 mg in sodium chloride 0.9 % 250 mL IVPB ([id number])  500  108   CO2 28 29   GLUCOSE 110* 96   BUN 11 10   CREATININE 0.74 0.52*   CALCIUM 9.3 9.3   BILITOT 0.3 0.2   AST 24 11*   ALT 18 13     No results for input(s): \"PHART\", \"CZW2PWY\", \"PO2ART\", \"DWK0RVP\", \"BEART\", \"OKJ7BLCX\", \"HGBART\", \"EL0PXZZCI\", \"FIO2A\", \"J2QRFMUE\", \"OXYHEM\", \"CARBOXHGBART\", \"METHGBART\", \"Z8NLFIXAY\", \"PHCORART\", \"TEMP\" in the last 72 hours.    Invalid input(s): \"HXL4LOYHL\"    24 Hour Results:  Recent Results (from the past 24 hours)   Procalcitonin    Collection Time: 03/19/25  1:59 AM   Result Value Ref Range    Procalcitonin <0.05 (H) 0 ng/mL   COVID-19 & Influenza Combo    Collection Time: 03/19/25  6:03 AM    Specimen: Nasopharyngeal   Result Value Ref Range    SARS-CoV-2, PCR Not Detected Not Detected      Rapid Influenza A By PCR Not Detected Not Detected      Rapid Influenza B By PCR Not Detected Not Detected     CBC with Auto Differential    Collection Time: 03/19/25  6:08 AM   Result Value Ref Range    WBC 6.3 3.6 - 11.0 K/uL    RBC 3.09 (L) 3.80 - 5.20 M/uL    Hemoglobin 8.9 (L) 11.5 - 16.0 g/dL    Hematocrit 29.2 (L) 35.0 - 47.0 %    MCV 94.5 80.0 - 99.0 FL    MCH 28.8 26.0 - 34.0 PG    MCHC 30.5 30.0 - 36.5 g/dL    RDW 15.7 (H) 11.5 - 14.5 %    Platelets 323 150 - 400 K/uL    MPV 10.2 8.9 - 12.9 FL    Nucleated RBCs 0.0 0.0  WBC    nRBC 0.00 0.00 - 0.01 K/uL    Neutrophils % 62.2 32.0 - 75.0 %    Lymphocytes % 22.1 12.0 - 49.0 %    Monocytes % 6.7 5.0 - 13.0 %    Eosinophils % 7.8 (H) 0.0 - 7.0 %    Basophils % 1.0 0.0 - 1.0 %    Immature Granulocytes % 0.2 0 - 0.5 %    Neutrophils Absolute 3.92 1.80 - 8.00 K/UL    Lymphocytes Absolute 1.39 0.80 - 3.50 K/UL    Monocytes Absolute 0.42 0.00 - 1.00 K/UL    Eosinophils Absolute 0.49 (H) 0.00 - 0.40 K/UL    Basophils Absolute 0.06 0.00 - 0.10 K/UL    Immature Granulocytes Absolute 0.01 0.00 - 0.04 K/UL    Differential Type AUTOMATED     Comprehensive Metabolic Panel    Collection Time: 03/19/25  6:08 AM   Result Value Ref Range

## 2025-03-20 LAB
ALBUMIN SERPL-MCNC: 3.2 G/DL (ref 3.5–5)
ALBUMIN/GLOB SERPL: 0.8 (ref 1.1–2.2)
ALP SERPL-CCNC: 122 U/L (ref 45–117)
ALT SERPL-CCNC: 12 U/L (ref 12–78)
ANION GAP SERPL CALC-SCNC: 3 MMOL/L (ref 2–12)
AST SERPL W P-5'-P-CCNC: 12 U/L (ref 15–37)
BASOPHILS # BLD: 0 K/UL (ref 0–0.1)
BASOPHILS NFR BLD: 0 % (ref 0–1)
BILIRUB SERPL-MCNC: 0.2 MG/DL (ref 0.2–1)
BUN SERPL-MCNC: 15 MG/DL (ref 6–20)
BUN/CREAT SERPL: 25 (ref 12–20)
CA-I BLD-MCNC: 10.2 MG/DL (ref 8.5–10.1)
CHLORIDE SERPL-SCNC: 106 MMOL/L (ref 97–108)
CO2 SERPL-SCNC: 31 MMOL/L (ref 21–32)
CREAT SERPL-MCNC: 0.6 MG/DL (ref 0.55–1.02)
DIFFERENTIAL METHOD BLD: ABNORMAL
EOSINOPHIL # BLD: 0 K/UL (ref 0–0.4)
EOSINOPHIL NFR BLD: 0 % (ref 0–7)
ERYTHROCYTE [DISTWIDTH] IN BLOOD BY AUTOMATED COUNT: 15.6 % (ref 11.5–14.5)
GLOBULIN SER CALC-MCNC: 3.9 G/DL (ref 2–4)
GLUCOSE SERPL-MCNC: 140 MG/DL (ref 65–100)
HCT VFR BLD AUTO: 31.3 % (ref 35–47)
HGB BLD-MCNC: 9.7 G/DL (ref 11.5–16)
IMM GRANULOCYTES # BLD AUTO: 0.02 K/UL (ref 0–0.04)
IMM GRANULOCYTES NFR BLD AUTO: 0.4 % (ref 0–0.5)
LYMPHOCYTES # BLD: 0.83 K/UL (ref 0.8–3.5)
LYMPHOCYTES NFR BLD: 14.7 % (ref 12–49)
M PNEUMO IGM SER IA-ACNC: NONREACTIVE
MCH RBC QN AUTO: 29 PG (ref 26–34)
MCHC RBC AUTO-ENTMCNC: 31 G/DL (ref 30–36.5)
MCV RBC AUTO: 93.7 FL (ref 80–99)
MONOCYTES # BLD: 0.2 K/UL (ref 0–1)
MONOCYTES NFR BLD: 3.5 % (ref 5–13)
NEUTS SEG # BLD: 4.6 K/UL (ref 1.8–8)
NEUTS SEG NFR BLD: 81.4 % (ref 32–75)
NRBC # BLD: 0 K/UL (ref 0–0.01)
NRBC BLD-RTO: 0 PER 100 WBC
PLATELET # BLD AUTO: 369 K/UL (ref 150–400)
PMV BLD AUTO: 10.4 FL (ref 8.9–12.9)
POTASSIUM SERPL-SCNC: 3.9 MMOL/L (ref 3.5–5.1)
PROT SERPL-MCNC: 7.1 G/DL (ref 6.4–8.2)
RBC # BLD AUTO: 3.34 M/UL (ref 3.8–5.2)
SODIUM SERPL-SCNC: 140 MMOL/L (ref 136–145)
WBC # BLD AUTO: 5.7 K/UL (ref 3.6–11)

## 2025-03-20 PROCEDURE — 1100000000 HC RM PRIVATE

## 2025-03-20 PROCEDURE — 2500000003 HC RX 250 WO HCPCS: Performed by: PHYSICIAN ASSISTANT

## 2025-03-20 PROCEDURE — 6360000002 HC RX W HCPCS: Performed by: PHYSICIAN ASSISTANT

## 2025-03-20 PROCEDURE — 94761 N-INVAS EAR/PLS OXIMETRY MLT: CPT

## 2025-03-20 PROCEDURE — 94762 N-INVAS EAR/PLS OXIMTRY CONT: CPT

## 2025-03-20 PROCEDURE — 6360000002 HC RX W HCPCS: Performed by: INTERNAL MEDICINE

## 2025-03-20 PROCEDURE — 80053 COMPREHEN METABOLIC PANEL: CPT

## 2025-03-20 PROCEDURE — 2580000003 HC RX 258: Performed by: PHYSICIAN ASSISTANT

## 2025-03-20 PROCEDURE — 6370000000 HC RX 637 (ALT 250 FOR IP): Performed by: PHYSICIAN ASSISTANT

## 2025-03-20 PROCEDURE — 2700000000 HC OXYGEN THERAPY PER DAY

## 2025-03-20 PROCEDURE — 85025 COMPLETE CBC W/AUTO DIFF WBC: CPT

## 2025-03-20 PROCEDURE — 36415 COLL VENOUS BLD VENIPUNCTURE: CPT

## 2025-03-20 PROCEDURE — 94640 AIRWAY INHALATION TREATMENT: CPT

## 2025-03-20 PROCEDURE — 6370000000 HC RX 637 (ALT 250 FOR IP): Performed by: INTERNAL MEDICINE

## 2025-03-20 PROCEDURE — 94760 N-INVAS EAR/PLS OXIMETRY 1: CPT

## 2025-03-20 RX ORDER — BENZONATATE 100 MG/1
100 CAPSULE ORAL 3 TIMES DAILY PRN
Status: DISCONTINUED | OUTPATIENT
Start: 2025-03-20 | End: 2025-03-22 | Stop reason: HOSPADM

## 2025-03-20 RX ADMIN — FERROUS SULFATE TAB 325 MG (65 MG ELEMENTAL FE) 325 MG: 325 (65 FE) TAB at 09:07

## 2025-03-20 RX ADMIN — IPRATROPIUM BROMIDE AND ALBUTEROL SULFATE 1 DOSE: 2.5; .5 SOLUTION RESPIRATORY (INHALATION) at 20:15

## 2025-03-20 RX ADMIN — ENOXAPARIN SODIUM 40 MG: 100 INJECTION SUBCUTANEOUS at 09:06

## 2025-03-20 RX ADMIN — AZITHROMYCIN MONOHYDRATE 500 MG: 500 INJECTION, POWDER, LYOPHILIZED, FOR SOLUTION INTRAVENOUS at 18:45

## 2025-03-20 RX ADMIN — SODIUM CHLORIDE, PRESERVATIVE FREE 10 ML: 5 INJECTION INTRAVENOUS at 09:08

## 2025-03-20 RX ADMIN — BUDESONIDE AND FORMOTEROL FUMARATE DIHYDRATE 2 PUFF: 160; 4.5 AEROSOL RESPIRATORY (INHALATION) at 20:15

## 2025-03-20 RX ADMIN — METHYLPREDNISOLONE SODIUM SUCCINATE 40 MG: 40 INJECTION INTRAMUSCULAR; INTRAVENOUS at 02:00

## 2025-03-20 RX ADMIN — SODIUM CHLORIDE, PRESERVATIVE FREE 10 ML: 5 INJECTION INTRAVENOUS at 21:00

## 2025-03-20 RX ADMIN — IPRATROPIUM BROMIDE AND ALBUTEROL SULFATE 1 DOSE: 2.5; .5 SOLUTION RESPIRATORY (INHALATION) at 13:31

## 2025-03-20 RX ADMIN — CEFTRIAXONE SODIUM 1000 MG: 1 INJECTION, POWDER, FOR SOLUTION INTRAMUSCULAR; INTRAVENOUS at 18:36

## 2025-03-20 RX ADMIN — FUROSEMIDE 40 MG: 10 INJECTION, SOLUTION INTRAMUSCULAR; INTRAVENOUS at 18:38

## 2025-03-20 RX ADMIN — ASPIRIN 162 MG: 81 TABLET, COATED ORAL at 09:07

## 2025-03-20 RX ADMIN — FUROSEMIDE 40 MG: 10 INJECTION, SOLUTION INTRAMUSCULAR; INTRAVENOUS at 09:07

## 2025-03-20 ASSESSMENT — PULMONARY FUNCTION TESTS
PEFR_L/MIN: 97
PEFR_L/MIN: 95

## 2025-03-20 NOTE — PLAN OF CARE
Problem: Chronic Conditions and Co-morbidities  Goal: Patient's chronic conditions and co-morbidity symptoms are monitored and maintained or improved  3/20/2025 1514 by Maribel Harley RN  Outcome: Progressing  3/20/2025 0415 by Angi Jensen RN  Outcome: Progressing     Problem: Discharge Planning  Goal: Discharge to home or other facility with appropriate resources  3/20/2025 1514 by Maribel Harley RN  Outcome: Progressing  3/20/2025 0415 by Angi Jensen RN  Outcome: Progressing  Flowsheets (Taken 3/19/2025 2030)  Discharge to home or other facility with appropriate resources: Identify barriers to discharge with patient and caregiver     Problem: Neurosensory - Adult  Goal: Achieves stable or improved neurological status  3/20/2025 1514 by Maribel Harley RN  Outcome: Progressing  3/20/2025 0415 by Angi Jensen RN  Outcome: Progressing  Goal: Achieves maximal functionality and self care  3/20/2025 1514 by Maribel Harley RN  Outcome: Progressing  3/20/2025 0415 by Angi Jensen RN  Outcome: Progressing     Problem: Respiratory - Adult  Goal: Achieves optimal ventilation and oxygenation  3/20/2025 1514 by Maribel Harley RN  Outcome: Progressing  3/20/2025 0415 by Angi Jensen RN  Outcome: Progressing     Problem: Cardiovascular - Adult  Goal: Maintains optimal cardiac output and hemodynamic stability  3/20/2025 1514 by Maribel Harley RN  Outcome: Progressing  3/20/2025 0415 by Angi Jensen RN  Outcome: Progressing  Goal: Absence of cardiac dysrhythmias or at baseline  3/20/2025 1514 by Maribel Harley RN  Outcome: Progressing  3/20/2025 0415 by Angi Jensen RN  Outcome: Progressing     Problem: Skin/Tissue Integrity - Adult  Goal: Skin integrity remains intact  3/20/2025 1514 by Maribel Harley RN  Outcome: Progressing  3/20/2025 0415 by Angi Jensen RN  Outcome: Progressing  Goal: Oral mucous membranes remain

## 2025-03-20 NOTE — PROGRESS NOTES
Hospitalist Progress Note               Daily Progress Note: 3/20/2025      Hospital Day: 3     Chief complaint:   Chief Complaint   Patient presents with    Shortness of Breath        Subjective:     Patient is seen today for follow-up. Patient seen examined at bedside. Patient's nieces at bedside.    Patient is on 1 L nasal cannula, stating 100 %. Blood pressure elevated 160s/70s mmHg.  Patient denies any chest pain, shortness of breath, palpitations, nausea/vomiting.      Medications reviewed  Current Facility-Administered Medications   Medication Dose Route Frequency    ALPRAZolam (XANAX) tablet 0.5 mg  0.5 mg Oral TID PRN    ferrous sulfate (IRON 325) tablet 325 mg  325 mg Oral Daily    zolpidem (AMBIEN) tablet 10 mg  10 mg Oral Nightly PRN    sodium chloride flush 0.9 % injection 5-40 mL  5-40 mL IntraVENous 2 times per day    sodium chloride flush 0.9 % injection 5-40 mL  5-40 mL IntraVENous PRN    0.9 % sodium chloride infusion   IntraVENous PRN    potassium chloride (KLOR-CON M) extended release tablet 40 mEq  40 mEq Oral PRN    Or    potassium bicarb-citric acid (EFFER-K) effervescent tablet 40 mEq  40 mEq Oral PRN    Or    potassium chloride 10 mEq/100 mL IVPB (Peripheral Line)  10 mEq IntraVENous PRN    magnesium sulfate 2000 mg in 50 mL IVPB premix  2,000 mg IntraVENous PRN    enoxaparin (LOVENOX) injection 40 mg  40 mg SubCUTAneous Daily    ondansetron (ZOFRAN-ODT) disintegrating tablet 4 mg  4 mg Oral Q8H PRN    Or    ondansetron (ZOFRAN) injection 4 mg  4 mg IntraVENous Q6H PRN    polyethylene glycol (GLYCOLAX) packet 17 g  17 g Oral Daily PRN    acetaminophen (TYLENOL) tablet 650 mg  650 mg Oral Q6H PRN    Or    acetaminophen (TYLENOL) suppository 650 mg  650 mg Rectal Q6H PRN    cefTRIAXone (ROCEPHIN) 1,000 mg in sterile water 10 mL IV syringe  1,000 mg IntraVENous Q24H    azithromycin (ZITHROMAX) 500 mg in sodium chloride 0.9 % 250 mL IVPB ([id number])  500 mg IntraVENous Q24H    furosemide

## 2025-03-20 NOTE — PLAN OF CARE
Problem: Chronic Conditions and Co-morbidities  Goal: Patient's chronic conditions and co-morbidity symptoms are monitored and maintained or improved  3/20/2025 0415 by Angi Jensen RN  Outcome: Progressing  3/19/2025 1416 by Maribel Harley RN  Outcome: Progressing  Flowsheets (Taken 3/19/2025 0840)  Care Plan - Patient's Chronic Conditions and Co-Morbidity Symptoms are Monitored and Maintained or Improved: Monitor and assess patient's chronic conditions and comorbid symptoms for stability, deterioration, or improvement     Problem: Discharge Planning  Goal: Discharge to home or other facility with appropriate resources  3/20/2025 0415 by Angi Jensen RN  Outcome: Progressing  Flowsheets (Taken 3/19/2025 2030)  Discharge to home or other facility with appropriate resources: Identify barriers to discharge with patient and caregiver  3/19/2025 1416 by Maribel Harley RN  Outcome: Progressing  Flowsheets (Taken 3/19/2025 0840)  Discharge to home or other facility with appropriate resources: Identify barriers to discharge with patient and caregiver     Problem: Pain  Goal: Verbalizes/displays adequate comfort level or baseline comfort level  3/20/2025 0415 by Angi Jensen RN  Outcome: Progressing  3/19/2025 1416 by Maribel Harley RN  Outcome: Progressing     Problem: Neurosensory - Adult  Goal: Achieves stable or improved neurological status  3/20/2025 0415 by Angi Jensen RN  Outcome: Progressing  3/19/2025 1416 by Maribel Harley RN  Outcome: Progressing  Flowsheets (Taken 3/19/2025 0840)  Achieves stable or improved neurological status: Assess for and report changes in neurological status  Goal: Achieves maximal functionality and self care  3/20/2025 0415 by Angi Jensen RN  Outcome: Progressing  3/19/2025 1416 by Maribel Harley RN  Outcome: Progressing  Flowsheets (Taken 3/19/2025 0840)  Achieves maximal functionality and self care: Monitor  swallowing and airway patency with patient fatigue and changes in neurological status     Problem: Respiratory - Adult  Goal: Achieves optimal ventilation and oxygenation  3/20/2025 0415 by Angi Jensen RN  Outcome: Progressing  3/19/2025 1416 by Maribel Harley, RN  Outcome: Not Progressing  Flowsheets (Taken 3/19/2025 0806)  Achieves optimal ventilation and oxygenation:   Assess for changes in respiratory status   Assess for changes in mentation and behavior   Position to facilitate oxygenation and minimize respiratory effort     Problem: Cardiovascular - Adult  Goal: Maintains optimal cardiac output and hemodynamic stability  Outcome: Progressing  Goal: Absence of cardiac dysrhythmias or at baseline  Outcome: Progressing     Problem: Skin/Tissue Integrity - Adult  Goal: Skin integrity remains intact  Outcome: Progressing  Goal: Oral mucous membranes remain intact  Outcome: Progressing     Problem: Musculoskeletal - Adult  Goal: Return mobility to safest level of function  Outcome: Progressing  Goal: Return ADL status to a safe level of function  Outcome: Progressing     Problem: Gastrointestinal - Adult  Goal: Maintains or returns to baseline bowel function  Outcome: Progressing  Goal: Maintains adequate nutritional intake  Outcome: Progressing     Problem: Genitourinary - Adult  Goal: Absence of urinary retention  Outcome: Progressing     Problem: Infection - Adult  Goal: Absence of infection at discharge  Outcome: Progressing     Problem: Metabolic/Fluid and Electrolytes - Adult  Goal: Electrolytes maintained within normal limits  Outcome: Progressing  Goal: Hemodynamic stability and optimal renal function maintained  Outcome: Progressing  Goal: Glucose maintained within prescribed range  Outcome: Progressing     Problem: Hematologic - Adult  Goal: Maintains hematologic stability  Outcome: Progressing     Problem: Safety - Adult  Goal: Free from fall injury  Outcome: Progressing     Problem:

## 2025-03-20 NOTE — CONSULTS
IMPRESSION:   Acute hypoxic respiratory failure  Non-small cell lung cancer right upper lobe lung mass  Community-acquired pneumonia  Acute pulmonary edema  COPD exacerbation  Asthma by history  Hypokalemia  Right leg swelling rule out DVT  History of anxiety  Additional workup outlined below  Pt is at high risk of sudden decline and decompensation with life threatening consequenses and continued end organ dysfunction and failure  Pt is critically ill. Time spent with pt and staff actively rendering care, managing pt and coordinating care as stated below; 30 minutes, exclusive of any procedures      RECOMMENDATIONS/PLAN:     69-year-old lady came in because of shortness of breath and dyspnea she was recently diagnosed with non-small cell lung cancer [facility] after biopsy was done at [facility] and she was supposed to get chemotherapy but her condition got worse came to the hospital she had significant past medical history of asthma as a child her and she use inhalers at home she was a ex-smoker quit smoking 20 years ago pack per day she came to the hospital she was hypoxic put on oxygen 6 L nasal cannula she was on room air not on oxygen at home CAT scan of the chest was done negative for pulmonary emboli and patient has right upper lobe lung mass and also shows pulmonary edema  Agree with Empiric IV antibiotics pending culture results   On Rocephin and Zithromax  Potassium corrected  Received 3 doses of IV Solu-Medrol  Patient on nebulizer treatment Symbicort inhaler  Was on oxygen 6 L nasal Cannula now she is on 1-1/2 L oxygen as salvage oxygen delivery device to provide high concentration of oxygen to overcome refractory hypoxia;   Transfuse prn to maintain Hgb > 7  Labs to follow electrolytes, renal function and and blood counts  Bronchial hygiene with respiratory therapy techniques, bronchodilators  Pt needs IV fluids with additives and Drug therapy requiring intensive monitoring for

## 2025-03-21 LAB
ALBUMIN SERPL-MCNC: 2.9 G/DL (ref 3.5–5)
ALBUMIN/GLOB SERPL: 0.8 (ref 1.1–2.2)
ALP SERPL-CCNC: 109 U/L (ref 45–117)
ALT SERPL-CCNC: 14 U/L (ref 12–78)
ANION GAP SERPL CALC-SCNC: 2 MMOL/L (ref 2–12)
AST SERPL W P-5'-P-CCNC: 10 U/L (ref 15–37)
BASOPHILS # BLD: 0.09 K/UL (ref 0–0.1)
BASOPHILS NFR BLD: 1.1 % (ref 0–1)
BILIRUB SERPL-MCNC: 0.2 MG/DL (ref 0.2–1)
BUN SERPL-MCNC: 18 MG/DL (ref 6–20)
BUN/CREAT SERPL: 25 (ref 12–20)
CA-I BLD-MCNC: 9.1 MG/DL (ref 8.5–10.1)
CHLORIDE SERPL-SCNC: 105 MMOL/L (ref 97–108)
CO2 SERPL-SCNC: 32 MMOL/L (ref 21–32)
CREAT SERPL-MCNC: 0.73 MG/DL (ref 0.55–1.02)
DIFFERENTIAL METHOD BLD: ABNORMAL
EOSINOPHIL # BLD: 0.39 K/UL (ref 0–0.4)
EOSINOPHIL NFR BLD: 4.8 % (ref 0–7)
ERYTHROCYTE [DISTWIDTH] IN BLOOD BY AUTOMATED COUNT: 15.8 % (ref 11.5–14.5)
GLOBULIN SER CALC-MCNC: 3.8 G/DL (ref 2–4)
GLUCOSE SERPL-MCNC: 96 MG/DL (ref 65–100)
HCT VFR BLD AUTO: 29.9 % (ref 35–47)
HGB BLD-MCNC: 9.3 G/DL (ref 11.5–16)
IMM GRANULOCYTES # BLD AUTO: 0.03 K/UL (ref 0–0.04)
IMM GRANULOCYTES NFR BLD AUTO: 0.4 % (ref 0–0.5)
LYMPHOCYTES # BLD: 2.81 K/UL (ref 0.8–3.5)
LYMPHOCYTES NFR BLD: 34.4 % (ref 12–49)
MCH RBC QN AUTO: 29.2 PG (ref 26–34)
MCHC RBC AUTO-ENTMCNC: 31.1 G/DL (ref 30–36.5)
MCV RBC AUTO: 94 FL (ref 80–99)
MONOCYTES # BLD: 0.43 K/UL (ref 0–1)
MONOCYTES NFR BLD: 5.3 % (ref 5–13)
NEUTS SEG # BLD: 4.42 K/UL (ref 1.8–8)
NEUTS SEG NFR BLD: 54 % (ref 32–75)
NRBC # BLD: 0 K/UL (ref 0–0.01)
NRBC BLD-RTO: 0 PER 100 WBC
PLATELET # BLD AUTO: 365 K/UL (ref 150–400)
PMV BLD AUTO: 10.4 FL (ref 8.9–12.9)
POTASSIUM SERPL-SCNC: 3.5 MMOL/L (ref 3.5–5.1)
PROT SERPL-MCNC: 6.7 G/DL (ref 6.4–8.2)
RBC # BLD AUTO: 3.18 M/UL (ref 3.8–5.2)
SODIUM SERPL-SCNC: 139 MMOL/L (ref 136–145)
WBC # BLD AUTO: 8.2 K/UL (ref 3.6–11)

## 2025-03-21 PROCEDURE — 85025 COMPLETE CBC W/AUTO DIFF WBC: CPT

## 2025-03-21 PROCEDURE — 6370000000 HC RX 637 (ALT 250 FOR IP): Performed by: PHYSICIAN ASSISTANT

## 2025-03-21 PROCEDURE — 80053 COMPREHEN METABOLIC PANEL: CPT

## 2025-03-21 PROCEDURE — 1100000000 HC RM PRIVATE

## 2025-03-21 PROCEDURE — 36415 COLL VENOUS BLD VENIPUNCTURE: CPT

## 2025-03-21 PROCEDURE — 6370000000 HC RX 637 (ALT 250 FOR IP): Performed by: INTERNAL MEDICINE

## 2025-03-21 PROCEDURE — 94640 AIRWAY INHALATION TREATMENT: CPT

## 2025-03-21 PROCEDURE — 97530 THERAPEUTIC ACTIVITIES: CPT

## 2025-03-21 PROCEDURE — 2500000003 HC RX 250 WO HCPCS: Performed by: PHYSICIAN ASSISTANT

## 2025-03-21 PROCEDURE — 2580000003 HC RX 258: Performed by: PHYSICIAN ASSISTANT

## 2025-03-21 PROCEDURE — 97165 OT EVAL LOW COMPLEX 30 MIN: CPT

## 2025-03-21 PROCEDURE — 6360000002 HC RX W HCPCS: Performed by: PHYSICIAN ASSISTANT

## 2025-03-21 PROCEDURE — 97535 SELF CARE MNGMENT TRAINING: CPT

## 2025-03-21 PROCEDURE — 97161 PT EVAL LOW COMPLEX 20 MIN: CPT

## 2025-03-21 RX ORDER — ALPRAZOLAM 0.5 MG
1 TABLET ORAL 3 TIMES DAILY PRN
Status: DISCONTINUED | OUTPATIENT
Start: 2025-03-21 | End: 2025-03-22 | Stop reason: HOSPADM

## 2025-03-21 RX ORDER — AZITHROMYCIN 500 MG/1
500 TABLET, FILM COATED ORAL DAILY
Qty: 1 TABLET | Refills: 0 | Status: SHIPPED | OUTPATIENT
Start: 2025-03-21 | End: 2025-03-22

## 2025-03-21 RX ADMIN — ZOLPIDEM TARTRATE 10 MG: 5 TABLET, FILM COATED ORAL at 00:17

## 2025-03-21 RX ADMIN — ALPRAZOLAM 1 MG: 0.5 TABLET ORAL at 21:02

## 2025-03-21 RX ADMIN — ASPIRIN 162 MG: 81 TABLET, COATED ORAL at 08:50

## 2025-03-21 RX ADMIN — FUROSEMIDE 40 MG: 10 INJECTION, SOLUTION INTRAMUSCULAR; INTRAVENOUS at 08:50

## 2025-03-21 RX ADMIN — BUDESONIDE AND FORMOTEROL FUMARATE DIHYDRATE 2 PUFF: 160; 4.5 AEROSOL RESPIRATORY (INHALATION) at 07:59

## 2025-03-21 RX ADMIN — BUDESONIDE AND FORMOTEROL FUMARATE DIHYDRATE 2 PUFF: 160; 4.5 AEROSOL RESPIRATORY (INHALATION) at 21:16

## 2025-03-21 RX ADMIN — IPRATROPIUM BROMIDE AND ALBUTEROL SULFATE 1 DOSE: 2.5; .5 SOLUTION RESPIRATORY (INHALATION) at 07:59

## 2025-03-21 RX ADMIN — SODIUM CHLORIDE, PRESERVATIVE FREE 10 ML: 5 INJECTION INTRAVENOUS at 21:03

## 2025-03-21 RX ADMIN — ENOXAPARIN SODIUM 40 MG: 100 INJECTION SUBCUTANEOUS at 08:50

## 2025-03-21 RX ADMIN — CEFTRIAXONE SODIUM 1000 MG: 1 INJECTION, POWDER, FOR SOLUTION INTRAMUSCULAR; INTRAVENOUS at 16:07

## 2025-03-21 RX ADMIN — FUROSEMIDE 40 MG: 10 INJECTION, SOLUTION INTRAMUSCULAR; INTRAVENOUS at 16:07

## 2025-03-21 RX ADMIN — IPRATROPIUM BROMIDE AND ALBUTEROL SULFATE 1 DOSE: 2.5; .5 SOLUTION RESPIRATORY (INHALATION) at 21:14

## 2025-03-21 RX ADMIN — FERROUS SULFATE TAB 325 MG (65 MG ELEMENTAL FE) 325 MG: 325 (65 FE) TAB at 08:50

## 2025-03-21 RX ADMIN — AZITHROMYCIN MONOHYDRATE 500 MG: 500 INJECTION, POWDER, LYOPHILIZED, FOR SOLUTION INTRAVENOUS at 16:55

## 2025-03-21 RX ADMIN — ZOLPIDEM TARTRATE 10 MG: 5 TABLET, FILM COATED ORAL at 21:02

## 2025-03-21 RX ADMIN — ALPRAZOLAM 0.5 MG: 0.5 TABLET ORAL at 00:17

## 2025-03-21 RX ADMIN — IPRATROPIUM BROMIDE AND ALBUTEROL SULFATE 1 DOSE: 2.5; .5 SOLUTION RESPIRATORY (INHALATION) at 13:03

## 2025-03-21 NOTE — CARE COORDINATION
1:07pm  CM met with the patient at the bedside to discuss her discharge plan. We reviewed therapy recommendations for Home Health as well as a rolling walker. Patient declined HH services but is agreeable for [facility]AR[facility] to order a rolling walker. [facility]IDA will obtain a DME order.    Also provided patient with IMM and she wants to appeal her discharge. She signed the IMM and called to appeal. CM received notification of appeal from [employer] and uploading  documents now.     2:26pm  All medical files have been uploaded to the [employer] portal for the Medicare appeal.  
Capacity  Other persons present: Sister Iman    Healthcare Decision Maker: No healthcare decision makers have been documented.       Content/Action Overview:  Has NO ACP documents-Information provided  Reviewed DNR/DNI and patient elects Full Code (Attempt Resuscitation)        Length of Voluntary ACP Conversation in minutes:  <16 minutes (Non-Billable)    BELKIS BROWNE

## 2025-03-21 NOTE — PROGRESS NOTES
Patient placed on overnight pulse ox on RA. No distress noted.     03/20/25 2140   Treatment   Pre-Tx Pulse 95   Pre-Tx Resps 18   Peak Flow 97   Oxygen Therapy/Pulse Ox   $Pulse Oximeter $Overnight

## 2025-03-21 NOTE — PROGRESS NOTES
OCCUPATIONAL THERAPY EVALUATION AND DISCHARGE  Patient: Tasha Kyle (69 y.o. female)  Date: 3/21/2025  Primary Diagnosis: Non-small cell lung cancer, unspecified laterality (HCC) [C34.90]  Sepsis (HCC) [A41.9]  Pneumonia due to infectious organism [J18.9]  Dyspnea, unspecified type [R06.00]  Hypertension, unspecified type [I10]  Community acquired pneumonia, unspecified laterality [J18.9]  Acute respiratory failure with hypoxia [J96.01]       Precautions: Fall Risk, General Precautions   Recommendations for nursing mobility: Out of bed to chair for meals, Encourage HEP in prep for ADLs/mobility; see handout for details, Use of bed/chair alarm for safety, LE elevation for management of edema, AD and gt belt for bed to chair , Amb to bathroom with AD and gait belt, and Assist x1    In place during session:Peripheral IV, External Catheter, and EKG/telemetry   ASSESSMENT  Pt is a 69 y.o. female admitted 3/18/25 presenting to [facility] for dyspnea on exertion; pt currently being treated for acute respiratory failure with hypoxia, lung CA, CAP and acute pulmonary edema.  Pt received sitting in bedside chair upon arrival, AXO x 4, and agreeable to OT evaluation.     Based on the objective data described below pt currently present at/near baseline independent/supervision for ADLs and function mobility/transfers at this time. Needs light assist for lower surface heights. (See below for objective details and assist levels).     Overall pt tolerated session well on RA saturating at 99%~100% w/ activity/mobility and no c/o SOB. Pt min A for low transfer heights, otherwise CGA. Independent in LE dressing in unsupported sitting. Supervision for dynamic standing BADL tasks. Educated pt home EC strategies for BADLs/higher level IADLs and home safety.  Pt has no skilled acute OT needs at this time either noted by OT or reported by pt, will DC skilled OT following evaluation; pt verbalized understanding and agreement.  Current OT

## 2025-03-21 NOTE — PLAN OF CARE
[x] 4   3.  Moving from lying on back to sitting on the side of the bed?   [] 1   [] 2   [x] 3   [] 4          How much help from another person does the patient currently need... Total A Lot A Little None   4.  Moving to and from a bed to a chair (including a wheelchair)?   [] 1   [] 2   [x] 3   [] 4   5.  Need to walk in hospital room?   [] 1   [] 2   [x] 3   [] 4   6.  Climbing 3-5 steps with a railing?   [] 1   [] 2   [x] 3   [] 4   © , Trustees of [facility], under license to [employer]. All rights reserved     Score:  Initial:  Most Recent: X (Date: 3/21/2025 )   Interpretation of Tool:  Represents activities that are increasingly more difficult (i.e. Bed mobility, Transfers, Gait).  Score 24 23 22-20 19-15 14-10 9-7 6   Modifier CH CI CJ CK CL CM CN         Physical Therapy Evaluation Charge Determination   History Examination Presentation Decision-Making   MEDIUM  Complexity : 1-2 comorbidities / personal factors will impact the outcome/ POC  MEDIUM Complexity : 3 Standardized tests and measures addressing body structure, function, activity limitation and / or participation in recreation  LOW Complexity : Stable, uncomplicated  Other outcome measures [facility] 6  LOW      Based on the above components, the patient evaluation is determined to be of the following complexity level: LOW    Pain Ratin/10   Pain Intervention(s):       Activity Tolerance:   Good and SpO2 stable on room air    After treatment patient left in no apparent distress:   Bed locked and in lowest position Patient left in no apparent distress sitting up in chair and Call bell within reach and nsg updated.    COMMUNICATION/EDUCATION:   The patient’s plan of care was discussed with: Registered nurse    Patient Education  Education Given To: Patient  Education Provided: Role of Therapy;Plan of Care;Transfer Training;Mobility Training  Education Method: Demonstration;Verbal  Barriers to Learning: None  Education Outcome:  Verbalized understanding;Continued education needed         Thank you for this referral.  Franny Shields, PT  Minutes: 44

## 2025-03-21 NOTE — PLAN OF CARE
Problem: Pain  Goal: Verbalizes/displays adequate comfort level or baseline comfort level  3/20/2025 2109 by Chai Vee RN  Outcome: Progressing  3/20/2025 1514 by Maribel Harley RN  Outcome: Adequate for Discharge     Problem: Respiratory - Adult  Goal: Achieves optimal ventilation and oxygenation  3/20/2025 1514 by Maribel Harley, RN  Outcome: Progressing  Flowsheets (Taken 3/20/2025 0830)  Achieves optimal ventilation and oxygenation:   Assess for changes in respiratory status   Assess for changes in mentation and behavior   Position to facilitate oxygenation and minimize respiratory effort

## 2025-03-21 NOTE — CONSULTS
Paco Alejandra PA-C   Stopped at 03/20/25 1956    furosemide (LASIX) injection 40 mg  40 mg IntraVENous BID Paco Alejandra PA-C   40 mg at 03/21/25 0850    aspirin EC tablet 162 mg  162 mg Oral Daily Paco Alejandra PA-C   162 mg at 03/21/25 0850    budesonide-formoterol (SYMBICORT) 160-4.5 MCG/ACT inhaler 2 puff  2 puff Inhalation BID RT Shon Bermeo MD   2 puff at 03/21/25 0759    ipratropium 0.5 mg-albuterol 2.5 mg (DUONEB) nebulizer solution 1 Dose  1 Dose Inhalation Q6H WA RT Shon Bermeo MD   1 Dose at 03/21/25 0759        ARTERIAL BLOOD GAS  No results for input(s): \"PHART\", \"ENE5DLZ\", \"PO2ART\", \"BEO5KUD\", \"BEART\", \"HRK7UNGB\", \"HGBART\", \"IZ0IPTBZM\", \"FIO2A\", \"S7JOTLEI\", \"OXYHEM\", \"CARBOXHGBART\", \"METHGBART\", \"Q1CQMOVME\", \"PHCORART\", \"TEMP\" in the last 72 hours.    Invalid input(s): \"XRD6IWNNA\"  Labs:    Recent Labs     03/19/25  0608 03/20/25  0636 03/21/25  0511   WBC 6.3 5.7 8.2   HGB 8.9* 9.7* 9.3*    369 365     Recent Labs     03/19/25  0608 03/19/25  1829 03/20/25  0636 03/21/25  0511     --  140 139   K 2.8* 3.9 3.9 3.5     --  106 105   CO2 29  --  31 32   GLUCOSE 96  --  140* 96   BUN 10  --  15 18   CREATININE 0.52*  --  0.60 0.73   CALCIUM 9.3  --  10.2* 9.1   BILITOT 0.2  --  0.2 0.2   AST 11*  --  12* 10*   ALT 13  --  12 14     No results for input(s): \"CKTOTAL\", \"CKMB\", \"CKMBINDEX\", \"TROPONINI\" in the last 72 hours.  No results found for: \"PROBNP\", \"BNP\"   No results found for: \"TSH\"   Results       Procedure Component Value Units Date/Time    Legionella antigen, urine [[id number]] Collected: 03/20/25 1244    Order Status: No result Specimen: Urine     COVID-19 & Influenza Combo [[id number]] Collected: 03/19/25 0603    Order Status: Completed Specimen: Nasopharyngeal Updated: 03/19/25 0748     SARS-CoV-2, PCR Not Detected        Comment: Not Detected results do not preclude SARS-CoV-2 infection and should not be used as the sole basis for patient  Helical thin section chest CT following intravenous administration of nonionic contrast 100 mL of isovue 370 according to departmental PE protocol. Coronal and sagittal reformats were performed. 3D post processing was performed.  CT dose reduction was achieved through the use of a standardized protocol tailored for this examination and automatic exposure control for dose modulation. FINDINGS: This is a limited quality study for the evaluation of pulmonary embolism to the proximal segmental arterial level. There is no pulmonary embolism to this level. Evaluation is limited by motion. No axillary or supraclavicular adenopathy. Left chest wall Port-A-Cath MEDIASTINUM: There is confluent soft tissue in the pretracheal region extending around the bilateral mainstem bronchi with some narrowing of the bronchi present. JILLIAN: No mass or lymphadenopathy. THORACIC AORTA: No aneurysm. HEART: Normal in size. ESOPHAGUS: No wall thickening or dilatation. TRACHEA/BRONCHI: Patent. PLEURA: Small bilateral pleural effusions LUNGS: Probable background pulmonary edema. Multifocal airspace opacities with a more confluent airspace opacity or mass in the right upper lobe measuring 2.6 x 1.9 cm UPPER ABDOMEN: Simple cyst left kidney BONES: No aggressive bone lesion or fracture.     1. Limited study. No evidence of pulmonary embolus. 2. Right upper lobe masslike opacity. Recommend correlation with history. There is confluent soft tissue in the subcarinal region extending around the bilateral bronchi which are narrowed which may represent malignancy or treated lymphadenopathy. No prior studies are available for comparison 3. Probable pulmonary edema and airspace opacities which may reflect superimposed infection Electronically signed by Robert Turk    XR CHEST PORTABLE  Result Date: 3/18/2025  INDICATION:  SIRS Exam: Portable chest 1618. Comparison: 4/4/2024. Findings: Cardiomediastinal silhouette is within normal limits. Pulmonary

## 2025-03-21 NOTE — DISCHARGE SUMMARY
Physician Discharge Summary     Patient ID:    Tasha Kyle  [id number]  69 y.o.  1955    Admit date: 3/18/2025    Discharge date : 3/21/2025      Final Diagnoses:   Non-small cell lung cancer, unspecified laterality (HCC) [C34.90]  Sepsis (HCC) [A41.9]  Pneumonia due to infectious organism [J18.9]  Dyspnea, unspecified type [R06.00]  Hypertension, unspecified type [I10]  Community acquired pneumonia, unspecified laterality [J18.9]  Acute respiratory failure with hypoxia [J96.01]  Acute hypoxic respiratory failure  the non-small cell lung cancer  right upper lobe lung mass  come require pneumonia  acute pulmonary edema  COPD  asthma  hypokalemia  right leg swelling  history of anxiety    Reason for Hospitalization:    Acute hypoxic respiratory failure  the non-small cell lung cancer  right upper lobe lung mass  come require pneumonia  acute pulmonary edema  COPD  asthma  hypokalemia    Hospital Course:     Tasha Kyle is a 69 y.o.  female with PMHx significant for history of TIA, non-small cell lung cancer scheduled for chemotherapy later in the week, Port-A-Cath placement, anxiety and childhood asthma who presents to the emergency department with dyspnea on exertion.  Patient was initially placed on 6 L of oxygen via nasal cannula due to acute respiratory failure with hypoxia.  Her baseline is room air.  CT scan of the chest was performed to rule out pulmonary emboli.  CT of the chest revealed no evidence of pulmonary emboli although right upper lobe masslike opacity with findings concerning for airspace opacities versus pulmonary edema.  White count is normal.     Patient was admitted for acute hypoxic respiratory failure requiring supplemental oxygen 2/2 come require pneumonia as well as acute pulmonary edema. She was also found to have a history of non-small cell lung cancer in the right upper lobe lung mass.  Pulmonology was consulted. Patient was started on empiric  03/19/25  0608 03/20/25  0636 03/21/25  0511   AST 11* 12* 10*   ALT 13 12 14   ALKPHOS 113 122* 109   BILITOT 0.2 0.2 0.2   GLOB 3.5 3.9 3.8     No results for input(s): \"INR\", \"PROTIME\", \"APTT\" in the last 72 hours.   No results for input(s): \"IRON\", \"TIBC\" in the last 72 hours.    Invalid input(s): \"PSAT\", \"FERR\"   No results for input(s): \"PH\", \"PCO2\", \"PO2\" in the last 72 hours.  No results for input(s): \"CKTOTAL\", \"CKMB\", \"TROPONINI\" in the last 72 hours.  No results found for: \"POCGLU\"      Discharge time spent 35 minutes    Signed:  Demetra Singh MD  3/21/2025  12:25 PM

## 2025-03-22 VITALS
TEMPERATURE: 97.9 F | HEART RATE: 83 BPM | BODY MASS INDEX: 36.99 KG/M2 | WEIGHT: 201 LBS | RESPIRATION RATE: 16 BRPM | DIASTOLIC BLOOD PRESSURE: 77 MMHG | HEIGHT: 62 IN | OXYGEN SATURATION: 97 % | SYSTOLIC BLOOD PRESSURE: 145 MMHG

## 2025-03-22 LAB
ALBUMIN SERPL-MCNC: 3.3 G/DL (ref 3.5–5)
ALBUMIN/GLOB SERPL: 0.9 (ref 1.1–2.2)
ALP SERPL-CCNC: 117 U/L (ref 45–117)
ALT SERPL-CCNC: 15 U/L (ref 12–78)
ANION GAP SERPL CALC-SCNC: 5 MMOL/L (ref 2–12)
AST SERPL W P-5'-P-CCNC: 10 U/L (ref 15–37)
BASOPHILS # BLD: 0.09 K/UL (ref 0–0.1)
BASOPHILS NFR BLD: 1.3 % (ref 0–1)
BILIRUB SERPL-MCNC: <0.1 MG/DL (ref 0.2–1)
BUN SERPL-MCNC: 15 MG/DL (ref 6–20)
BUN/CREAT SERPL: 24 (ref 12–20)
CA-I BLD-MCNC: 9.9 MG/DL (ref 8.5–10.1)
CHLORIDE SERPL-SCNC: 104 MMOL/L (ref 97–108)
CO2 SERPL-SCNC: 30 MMOL/L (ref 21–32)
CREAT SERPL-MCNC: 0.62 MG/DL (ref 0.55–1.02)
DIFFERENTIAL METHOD BLD: ABNORMAL
EOSINOPHIL # BLD: 0.7 K/UL (ref 0–0.4)
EOSINOPHIL NFR BLD: 10 % (ref 0–7)
ERYTHROCYTE [DISTWIDTH] IN BLOOD BY AUTOMATED COUNT: 15.6 % (ref 11.5–14.5)
GLOBULIN SER CALC-MCNC: 3.7 G/DL (ref 2–4)
GLUCOSE SERPL-MCNC: 117 MG/DL (ref 65–100)
HCT VFR BLD AUTO: 32.5 % (ref 35–47)
HGB BLD-MCNC: 10.1 G/DL (ref 11.5–16)
IMM GRANULOCYTES # BLD AUTO: 0.03 K/UL (ref 0–0.04)
IMM GRANULOCYTES NFR BLD AUTO: 0.4 % (ref 0–0.5)
LYMPHOCYTES # BLD: 1.98 K/UL (ref 0.8–3.5)
LYMPHOCYTES NFR BLD: 28.2 % (ref 12–49)
MCH RBC QN AUTO: 29.1 PG (ref 26–34)
MCHC RBC AUTO-ENTMCNC: 31.1 G/DL (ref 30–36.5)
MCV RBC AUTO: 93.7 FL (ref 80–99)
MONOCYTES # BLD: 0.42 K/UL (ref 0–1)
MONOCYTES NFR BLD: 6 % (ref 5–13)
NEUTS SEG # BLD: 3.79 K/UL (ref 1.8–8)
NEUTS SEG NFR BLD: 54.1 % (ref 32–75)
NRBC # BLD: 0 K/UL (ref 0–0.01)
NRBC BLD-RTO: 0 PER 100 WBC
PLATELET # BLD AUTO: 385 K/UL (ref 150–400)
PMV BLD AUTO: 10.1 FL (ref 8.9–12.9)
POTASSIUM SERPL-SCNC: 3.6 MMOL/L (ref 3.5–5.1)
PROT SERPL-MCNC: 7 G/DL (ref 6.4–8.2)
RBC # BLD AUTO: 3.47 M/UL (ref 3.8–5.2)
SODIUM SERPL-SCNC: 139 MMOL/L (ref 136–145)
WBC # BLD AUTO: 7 K/UL (ref 3.6–11)

## 2025-03-22 PROCEDURE — 6370000000 HC RX 637 (ALT 250 FOR IP): Performed by: INTERNAL MEDICINE

## 2025-03-22 PROCEDURE — 36415 COLL VENOUS BLD VENIPUNCTURE: CPT

## 2025-03-22 PROCEDURE — 80053 COMPREHEN METABOLIC PANEL: CPT

## 2025-03-22 PROCEDURE — 94640 AIRWAY INHALATION TREATMENT: CPT

## 2025-03-22 PROCEDURE — 6370000000 HC RX 637 (ALT 250 FOR IP): Performed by: PHYSICIAN ASSISTANT

## 2025-03-22 PROCEDURE — 6360000002 HC RX W HCPCS: Performed by: PHYSICIAN ASSISTANT

## 2025-03-22 PROCEDURE — 85025 COMPLETE CBC W/AUTO DIFF WBC: CPT

## 2025-03-22 PROCEDURE — 2500000003 HC RX 250 WO HCPCS: Performed by: PHYSICIAN ASSISTANT

## 2025-03-22 PROCEDURE — 94762 N-INVAS EAR/PLS OXIMTRY CONT: CPT

## 2025-03-22 RX ORDER — ALBUTEROL SULFATE 90 UG/1
2 INHALANT RESPIRATORY (INHALATION) 4 TIMES DAILY PRN
Qty: 18 G | Refills: 0 | Status: SHIPPED | OUTPATIENT
Start: 2025-03-22

## 2025-03-22 RX ORDER — FUROSEMIDE 40 MG/1
40 TABLET ORAL DAILY
Qty: 14 TABLET | Refills: 0 | Status: SHIPPED | OUTPATIENT
Start: 2025-03-23 | End: 2025-04-06

## 2025-03-22 RX ORDER — FUROSEMIDE 40 MG/1
40 TABLET ORAL DAILY
Status: DISCONTINUED | OUTPATIENT
Start: 2025-03-22 | End: 2025-03-22 | Stop reason: HOSPADM

## 2025-03-22 RX ORDER — BUDESONIDE AND FORMOTEROL FUMARATE DIHYDRATE 160; 4.5 UG/1; UG/1
2 AEROSOL RESPIRATORY (INHALATION)
Qty: 2 EACH | Refills: 0 | Status: SHIPPED | OUTPATIENT
Start: 2025-03-22 | End: 2025-04-21

## 2025-03-22 RX ADMIN — BUDESONIDE AND FORMOTEROL FUMARATE DIHYDRATE 2 PUFF: 160; 4.5 AEROSOL RESPIRATORY (INHALATION) at 07:31

## 2025-03-22 RX ADMIN — ENOXAPARIN SODIUM 40 MG: 100 INJECTION SUBCUTANEOUS at 07:46

## 2025-03-22 RX ADMIN — FERROUS SULFATE TAB 325 MG (65 MG ELEMENTAL FE) 325 MG: 325 (65 FE) TAB at 07:47

## 2025-03-22 RX ADMIN — ASPIRIN 162 MG: 81 TABLET, COATED ORAL at 07:47

## 2025-03-22 RX ADMIN — FUROSEMIDE 40 MG: 40 TABLET ORAL at 07:47

## 2025-03-22 RX ADMIN — SODIUM CHLORIDE, PRESERVATIVE FREE 10 ML: 5 INJECTION INTRAVENOUS at 07:50

## 2025-03-22 RX ADMIN — IPRATROPIUM BROMIDE AND ALBUTEROL SULFATE 1 DOSE: 2.5; .5 SOLUTION RESPIRATORY (INHALATION) at 07:32

## 2025-03-22 NOTE — PLAN OF CARE
PT attempted to see patient for skilled therapy but nursing reported that patient is discharging this AM. Will report to supervising PT and discharge patient from skilled PT services a this time.

## 2025-03-22 NOTE — PLAN OF CARE
Problem: Pain  Goal: Verbalizes/displays adequate comfort level or baseline comfort level  Outcome: Progressing     Problem: Skin/Tissue Integrity - Adult  Goal: Skin integrity remains intact  Description: 1.  Monitor for areas of redness and/or skin breakdown  2.  Assess vascular access sites hourly  3.  Every 4-6 hours minimum:  Change oxygen saturation probe site  4.  Every 4-6 hours:  If on nasal continuous positive airway pressure, respiratory therapy assess nares and determine need for appliance change or resting period  Outcome: Progressing

## 2025-03-22 NOTE — PROGRESS NOTES
Physician Progress Note      PATIENT:               ARTI HENSON  CSN #:                  [id number]  :                       1955  ADMIT DATE:       3/18/2025 3:54 PM  DISCH DATE:  RESPONDING  PROVIDER #:        Demetra Singh MD          QUERY TEXT:    Patient admitted with PNA. Noted documentation of sepsis. In order to support   the diagnosis of sepsis, please include additional clinical indicators in your   documentation.  Or please document if the diagnosis of sepsis has been ruled   out after further study    The medical record reflects the following:  Risk Factors: PNA  COPD    Clinical Indicators:  D/C summary  3/21  Final Diagnoses:  Sepsis (HCC) [A41.9]    procal <0.05  lactic acid 1.09  WBC 9.2  6.3  5.7  8.2  BLD cx neg    25 01:59  Mycoplasma pneumo IgM: NONREACTIVE    temp 97.9-98.9  HR   RR 16-23  /103-142/62      Treatment:  azithromycin (ZITHROMAX) 500 mg in sodium chloride 0.9 % 250 mL IVPB   ([id number])  cefTRIAXone (ROCEPHIN) 1,000 mg in sterile water 10 mL IV syringe  sodium chloride 0.9 % bolus 2,736 mL  sodium chloride 0.9 % bolus 1,000 mL    Thank you,  Alis Ortiz RN CDI CRCR  Clinical Documentation  [phone] or via [employer]  Options provided:  -- Sepsis was ruled out after study  -- Sepsis, POA present as evidenced by, Please document evidence.  -- Other - I will add my own diagnosis  -- Disagree - Not applicable / Not valid  -- Disagree - Clinically unable to determine / Unknown  -- Refer to Clinical Documentation Reviewer    PROVIDER RESPONSE TEXT:    Sepsis was ruled out after study.    Query created by: Alis Ortiz on 3/21/2025 3:16 PM      Electronically signed by:  Demetra Singh MD 3/22/2025 8:17 AM

## 2025-03-22 NOTE — DISCHARGE SUMMARY
Discharge Summary    Name: Tasha Kyle  [id number]  YOB: 1955 (Age: 69 y.o.)   Date of Admission: 3/18/2025  Date of Discharge: 3/22/2025  Attending Physician: Kalpana Santos MD    Discharge Diagnosis:   Non-small cell lung cancer, unspecified laterality (HCC) [C34.90]  Sepsis (HCC) [A41.9]  Pneumonia due to infectious organism [J18.9]  Dyspnea, unspecified type [R06.00]  Hypertension, unspecified type [I10]  Community acquired pneumonia, unspecified laterality [J18.9]  Acute respiratory failure with hypoxia [J96.01]  Acute hypoxic respiratory failure  the non-small cell lung cancer  right upper lobe lung mass  come require pneumonia  acute pulmonary edema  COPD  asthma  hypokalemia  right leg swelling  history of anxiety     Reason for Hospitalization:  Acute hypoxic respiratory failure  the non-small cell lung cancer  right upper lobe lung mass  come require pneumonia  acute pulmonary edema  COPD  asthma  hypokalemia    Consultations:  IP CONSULT TO PULMONOLOGY    Brief Hospital Course by Main Problems:   Tasha Kyle is a 69 y.o.  female with PMHx significant for history of TIA, non-small cell lung cancer scheduled for chemotherapy later in the week, Port-A-Cath placement, anxiety and childhood asthma who presents to the emergency department with dyspnea on exertion.  Patient was initially placed on 6 L of oxygen via nasal cannula due to acute respiratory failure with hypoxia.  Her baseline is room air.  CT scan of the chest was performed to rule out pulmonary emboli.  CT of the chest revealed no evidence of pulmonary emboli although right upper lobe masslike opacity with findings concerning for airspace opacities versus pulmonary edema.  White count is normal.      Patient was admitted for acute hypoxic respiratory failure requiring supplemental oxygen 2/2 come require pneumonia as well as acute pulmonary edema. She was also found to have a history

## 2025-03-23 LAB
BACTERIA SPEC CULT: NORMAL
BACTERIA SPEC CULT: NORMAL
Lab: NORMAL
Lab: NORMAL

## 2025-03-25 LAB
BACTERIA SPEC CULT: NORMAL
BACTERIA SPEC CULT: NORMAL
Lab: NORMAL
Lab: NORMAL

## 2025-06-17 ENCOUNTER — APPOINTMENT (OUTPATIENT)
Facility: HOSPITAL | Age: 70
DRG: 208 | End: 2025-06-17
Payer: MEDICARE

## 2025-06-17 ENCOUNTER — APPOINTMENT (OUTPATIENT)
Facility: HOSPITAL | Age: 70
DRG: 208 | End: 2025-06-17
Attending: HOSPITALIST
Payer: MEDICARE

## 2025-06-17 ENCOUNTER — HOSPITAL ENCOUNTER (INPATIENT)
Facility: HOSPITAL | Age: 70
LOS: 8 days | Discharge: HOME | DRG: 208 | End: 2025-06-25
Attending: EMERGENCY MEDICINE | Admitting: HOSPITALIST
Payer: MEDICARE

## 2025-06-17 DIAGNOSIS — I16.1 HYPERTENSIVE EMERGENCY: ICD-10-CM

## 2025-06-17 DIAGNOSIS — J96.02 ACUTE RESPIRATORY FAILURE WITH HYPOXIA AND HYPERCAPNIA (HCC): ICD-10-CM

## 2025-06-17 DIAGNOSIS — J96.01 ACUTE RESPIRATORY FAILURE WITH HYPOXIA AND HYPERCAPNIA (HCC): ICD-10-CM

## 2025-06-17 DIAGNOSIS — J81.0 FLASH PULMONARY EDEMA (HCC): Primary | ICD-10-CM

## 2025-06-17 DIAGNOSIS — R06.02 SHORTNESS OF BREATH: ICD-10-CM

## 2025-06-17 DIAGNOSIS — I63.312 CEREBROVASCULAR ACCIDENT (CVA) DUE TO THROMBOSIS OF LEFT MIDDLE CEREBRAL ARTERY (HCC): ICD-10-CM

## 2025-06-17 DIAGNOSIS — I42.9 CARDIOMYOPATHY, UNSPECIFIED TYPE (HCC): ICD-10-CM

## 2025-06-17 PROBLEM — I46.9 CARDIAC ARREST (HCC): Status: ACTIVE | Noted: 2025-06-17

## 2025-06-17 LAB
ALBUMIN SERPL-MCNC: 3.1 G/DL (ref 3.5–5)
ALBUMIN/GLOB SERPL: 0.9 (ref 1.1–2.2)
ALP SERPL-CCNC: 168 U/L (ref 45–117)
ALT SERPL-CCNC: 44 U/L (ref 12–78)
ANION GAP BLD CALC-SCNC: 8 (ref 10–20)
ANION GAP SERPL CALC-SCNC: 12 MMOL/L (ref 2–12)
ARTERIAL PATENCY WRIST A: ABNORMAL
ARTERIAL PATENCY WRIST A: ABNORMAL
ARTERIAL PATENCY WRIST A: POSITIVE
AST SERPL-CCNC: 50 U/L (ref 15–37)
BASE DEFICIT BLD-SCNC: 14.8 MMOL/L
BASE DEFICIT BLD-SCNC: 8.2 MMOL/L
BASE EXCESS BLDA CALC-SCNC: 2.1 MMOL/L
BASE EXCESS BLDA CALC-SCNC: 2.1 MMOL/L
BASOPHILS # BLD: 0 K/UL (ref 0–0.1)
BASOPHILS NFR BLD: 0 % (ref 0–1)
BDY SITE: ABNORMAL
BILIRUB SERPL-MCNC: 0.2 MG/DL (ref 0.2–1)
BUN SERPL-MCNC: 14 MG/DL (ref 6–20)
BUN/CREAT SERPL: 11 (ref 12–20)
CA-I BLD-MCNC: 1.38 MMOL/L (ref 1.15–1.33)
CALCIUM SERPL-MCNC: 9 MG/DL (ref 8.5–10.1)
CHLORIDE BLD-SCNC: 109 MMOL/L (ref 100–111)
CHLORIDE SERPL-SCNC: 108 MMOL/L (ref 97–108)
CO2 BLD-SCNC: 23 MMOL/L (ref 22–29)
CO2 SERPL-SCNC: 21 MMOL/L (ref 21–32)
CREAT SERPL-MCNC: 1.3 MG/DL (ref 0.55–1.02)
CREAT UR-MCNC: 1.2 MG/DL (ref 0.6–1.3)
DIFFERENTIAL METHOD BLD: ABNORMAL
EOSINOPHIL # BLD: 0.78 K/UL (ref 0–0.4)
EOSINOPHIL NFR BLD: 4 % (ref 0–7)
ERYTHROCYTE [DISTWIDTH] IN BLOOD BY AUTOMATED COUNT: 19.2 % (ref 11.5–14.5)
FIO2 ON VENT: 100 %
FIO2 ON VENT: 100 %
GAS FLOW.O2 O2 DELIVERY SYS: ABNORMAL
GAS FLOW.O2 SETTING OXYMISER: 22
GAS FLOW.O2 SETTING OXYMISER: 22
GLOBULIN SER CALC-MCNC: 3.6 G/DL (ref 2–4)
GLUCOSE BLD STRIP.AUTO-MCNC: 386 MG/DL (ref 74–99)
GLUCOSE SERPL-MCNC: 369 MG/DL (ref 65–100)
HCO3 BLD-SCNC: 16.9 MMOL/L (ref 21–28)
HCO3 BLDA-SCNC: 23 MMOL/L
HCO3 BLDA-SCNC: 24 MMOL/L (ref 22–26)
HCO3 BLDA-SCNC: 24 MMOL/L (ref 22–26)
HCT VFR BLD AUTO: 31 % (ref 35–47)
HGB BLD-MCNC: 9.1 G/DL (ref 11.5–16)
IMM GRANULOCYTES # BLD AUTO: 0 K/UL (ref 0–0.04)
IMM GRANULOCYTES NFR BLD AUTO: 0 % (ref 0–0.5)
LACTATE BLD-SCNC: 8.6 MMOL/L (ref 0.4–2)
LACTATE SERPL-SCNC: 2.1 MMOL/L (ref 0.4–2)
LYMPHOCYTES # BLD: 8.34 K/UL (ref 0.8–3.5)
LYMPHOCYTES NFR BLD: 43 % (ref 12–49)
MCH RBC QN AUTO: 31.2 PG (ref 26–34)
MCHC RBC AUTO-ENTMCNC: 29.4 G/DL (ref 30–36.5)
MCV RBC AUTO: 106.2 FL (ref 80–99)
MONOCYTES # BLD: 1.75 K/UL (ref 0–1)
MONOCYTES NFR BLD: 9 % (ref 5–13)
MYELOCYTES NFR BLD MANUAL: 1 %
NEUTS SEG # BLD: 8.34 K/UL (ref 1.8–8)
NEUTS SEG NFR BLD: 43 % (ref 32–75)
NRBC # BLD: 0.06 K/UL (ref 0–0.01)
NRBC BLD-RTO: 0.3 PER 100 WBC
O2/TOTAL GAS SETTING VFR VENT: 15 %
PCO2 BLD: 74.7 MMHG (ref 35–48)
PCO2 BLDA: 32 MMHG (ref 35–45)
PCO2 BLDA: 32 MMHG (ref 35–45)
PCO2 BLDV: 81.6 MMHG (ref 41–51)
PEEP RESPIRATORY: 12
PEEP RESPIRATORY: 12
PH BLD: 6.96 (ref 7.35–7.45)
PH BLDA: 7.5 (ref 7.35–7.45)
PH BLDA: 7.5 (ref 7.35–7.45)
PH BLDV: 7.06 (ref 7.32–7.42)
PLATELET # BLD AUTO: 484 K/UL (ref 150–400)
PMV BLD AUTO: 9.7 FL (ref 8.9–12.9)
PO2 BLD: 77 MMHG (ref 83–108)
PO2 BLDA: 216 MMHG (ref 80–100)
PO2 BLDA: 216 MMHG (ref 80–100)
PO2 BLDV: <27 MMHG (ref 25–40)
POTASSIUM BLD-SCNC: 3.7 MMOL/L (ref 3.5–5.5)
POTASSIUM SERPL-SCNC: 3.9 MMOL/L (ref 3.5–5.1)
PROT SERPL-MCNC: 6.7 G/DL (ref 6.4–8.2)
RBC # BLD AUTO: 2.92 M/UL (ref 3.8–5.2)
RBC MORPH BLD: ABNORMAL
SAO2 % BLD: 100 % (ref 92–97)
SAO2 % BLD: 100 % (ref 92–97)
SAO2 % BLD: 83.8 % (ref 92–97)
SAO2% DEVICE SAO2% SENSOR NAME: ABNORMAL
SAO2% DEVICE SAO2% SENSOR NAME: ABNORMAL
SERVICE CMNT-IMP: ABNORMAL
SERVICE CMNT-IMP: ABNORMAL
SODIUM BLD-SCNC: 140 MMOL/L (ref 136–145)
SODIUM SERPL-SCNC: 141 MMOL/L (ref 136–145)
SPECIMEN SITE: ABNORMAL
SPECIMEN TYPE: ABNORMAL
TROPONIN I SERPL HS-MCNC: 235 NG/L (ref 0–51)
TROPONIN I SERPL HS-MCNC: 2397 NG/L (ref 0–51)
VENTILATION MODE VENT: ABNORMAL
VENTILATION MODE VENT: ABNORMAL
VT SETTING VENT: 450
VT SETTING VENT: 450
WBC # BLD AUTO: 19.4 K/UL (ref 3.6–11)

## 2025-06-17 PROCEDURE — 2000000000 HC ICU R&B

## 2025-06-17 PROCEDURE — 84295 ASSAY OF SERUM SODIUM: CPT

## 2025-06-17 PROCEDURE — 93005 ELECTROCARDIOGRAM TRACING: CPT | Performed by: EMERGENCY MEDICINE

## 2025-06-17 PROCEDURE — 93005 ELECTROCARDIOGRAM TRACING: CPT | Performed by: INTERNAL MEDICINE

## 2025-06-17 PROCEDURE — 89220 SPUTUM SPECIMEN COLLECTION: CPT

## 2025-06-17 PROCEDURE — 85025 COMPLETE CBC W/AUTO DIFF WBC: CPT

## 2025-06-17 PROCEDURE — 6360000002 HC RX W HCPCS: Performed by: EMERGENCY MEDICINE

## 2025-06-17 PROCEDURE — 84484 ASSAY OF TROPONIN QUANT: CPT

## 2025-06-17 PROCEDURE — 31500 INSERT EMERGENCY AIRWAY: CPT

## 2025-06-17 PROCEDURE — 2500000003 HC RX 250 WO HCPCS: Performed by: EMERGENCY MEDICINE

## 2025-06-17 PROCEDURE — 36415 COLL VENOUS BLD VENIPUNCTURE: CPT

## 2025-06-17 PROCEDURE — 2700000000 HC OXYGEN THERAPY PER DAY

## 2025-06-17 PROCEDURE — 82330 ASSAY OF CALCIUM: CPT

## 2025-06-17 PROCEDURE — 6360000002 HC RX W HCPCS

## 2025-06-17 PROCEDURE — 71045 X-RAY EXAM CHEST 1 VIEW: CPT

## 2025-06-17 PROCEDURE — 99285 EMERGENCY DEPT VISIT HI MDM: CPT

## 2025-06-17 PROCEDURE — 94761 N-INVAS EAR/PLS OXIMETRY MLT: CPT

## 2025-06-17 PROCEDURE — 94002 VENT MGMT INPAT INIT DAY: CPT

## 2025-06-17 PROCEDURE — 2500000003 HC RX 250 WO HCPCS

## 2025-06-17 PROCEDURE — 6360000002 HC RX W HCPCS: Performed by: HOSPITALIST

## 2025-06-17 PROCEDURE — 84132 ASSAY OF SERUM POTASSIUM: CPT

## 2025-06-17 PROCEDURE — 82803 BLOOD GASES ANY COMBINATION: CPT

## 2025-06-17 PROCEDURE — 5A1935Z RESPIRATORY VENTILATION, LESS THAN 24 CONSECUTIVE HOURS: ICD-10-PCS | Performed by: HOSPITALIST

## 2025-06-17 PROCEDURE — 0BH17EZ INSERTION OF ENDOTRACHEAL AIRWAY INTO TRACHEA, VIA NATURAL OR ARTIFICIAL OPENING: ICD-10-PCS | Performed by: HOSPITALIST

## 2025-06-17 PROCEDURE — 92950 HEART/LUNG RESUSCITATION CPR: CPT

## 2025-06-17 PROCEDURE — 2500000003 HC RX 250 WO HCPCS: Performed by: HOSPITALIST

## 2025-06-17 PROCEDURE — 74018 RADEX ABDOMEN 1 VIEW: CPT

## 2025-06-17 PROCEDURE — 36600 WITHDRAWAL OF ARTERIAL BLOOD: CPT

## 2025-06-17 PROCEDURE — 82947 ASSAY GLUCOSE BLOOD QUANT: CPT

## 2025-06-17 PROCEDURE — 83605 ASSAY OF LACTIC ACID: CPT

## 2025-06-17 PROCEDURE — 80053 COMPREHEN METABOLIC PANEL: CPT

## 2025-06-17 PROCEDURE — 6360000002 HC RX W HCPCS: Performed by: PHYSICIAN ASSISTANT

## 2025-06-17 RX ORDER — ACETAMINOPHEN 325 MG/1
650 TABLET ORAL EVERY 6 HOURS PRN
Status: DISCONTINUED | OUTPATIENT
Start: 2025-06-17 | End: 2025-06-25 | Stop reason: HOSPADM

## 2025-06-17 RX ORDER — PROPOFOL 10 MG/ML
INJECTION, EMULSION INTRAVENOUS CONTINUOUS PRN
Status: COMPLETED | OUTPATIENT
Start: 2025-06-17 | End: 2025-06-17

## 2025-06-17 RX ORDER — POTASSIUM CHLORIDE 29.8 MG/ML
20 INJECTION INTRAVENOUS PRN
Status: DISCONTINUED | OUTPATIENT
Start: 2025-06-17 | End: 2025-06-25 | Stop reason: HOSPADM

## 2025-06-17 RX ORDER — NOREPINEPHRINE BITARTRATE 0.06 MG/ML
INJECTION, SOLUTION INTRAVENOUS
Status: COMPLETED
Start: 2025-06-17 | End: 2025-06-17

## 2025-06-17 RX ORDER — ONDANSETRON 2 MG/ML
4 INJECTION INTRAMUSCULAR; INTRAVENOUS EVERY 6 HOURS PRN
Status: DISCONTINUED | OUTPATIENT
Start: 2025-06-17 | End: 2025-06-25 | Stop reason: HOSPADM

## 2025-06-17 RX ORDER — ONDANSETRON 4 MG/1
4 TABLET, ORALLY DISINTEGRATING ORAL EVERY 8 HOURS PRN
Status: DISCONTINUED | OUTPATIENT
Start: 2025-06-17 | End: 2025-06-25 | Stop reason: HOSPADM

## 2025-06-17 RX ORDER — CALCIUM CHLORIDE 100 MG/ML
INJECTION INTRAVENOUS; INTRAVENTRICULAR DAILY PRN
Status: COMPLETED | OUTPATIENT
Start: 2025-06-17 | End: 2025-06-17

## 2025-06-17 RX ORDER — NITROGLYCERIN 20 MG/100ML
INJECTION INTRAVENOUS CONTINUOUS PRN
Status: COMPLETED | OUTPATIENT
Start: 2025-06-17 | End: 2025-06-17

## 2025-06-17 RX ORDER — SODIUM CHLORIDE 0.9 % (FLUSH) 0.9 %
5-40 SYRINGE (ML) INJECTION EVERY 12 HOURS SCHEDULED
Status: DISCONTINUED | OUTPATIENT
Start: 2025-06-17 | End: 2025-06-21

## 2025-06-17 RX ORDER — PROPOFOL 10 MG/ML
INJECTION, EMULSION INTRAVENOUS
Status: DISPENSED
Start: 2025-06-17 | End: 2025-06-18

## 2025-06-17 RX ORDER — SODIUM CHLORIDE 0.9 % (FLUSH) 0.9 %
5-40 SYRINGE (ML) INJECTION PRN
Status: DISCONTINUED | OUTPATIENT
Start: 2025-06-17 | End: 2025-06-21

## 2025-06-17 RX ORDER — BUMETANIDE 0.25 MG/ML
2 INJECTION, SOLUTION INTRAMUSCULAR; INTRAVENOUS ONCE
Status: COMPLETED | OUTPATIENT
Start: 2025-06-17 | End: 2025-06-17

## 2025-06-17 RX ORDER — PROPOFOL 10 MG/ML
5-50 INJECTION, EMULSION INTRAVENOUS CONTINUOUS
Status: DISCONTINUED | OUTPATIENT
Start: 2025-06-17 | End: 2025-06-19

## 2025-06-17 RX ORDER — EPINEPHRINE IN SOD CHLOR,ISO 1 MG/10 ML
SYRINGE (ML) INTRAVENOUS DAILY PRN
Status: COMPLETED | OUTPATIENT
Start: 2025-06-17 | End: 2025-06-17

## 2025-06-17 RX ORDER — FENTANYL CITRATE 50 UG/ML
50 INJECTION, SOLUTION INTRAMUSCULAR; INTRAVENOUS ONCE
Refills: 0 | Status: COMPLETED | OUTPATIENT
Start: 2025-06-17 | End: 2025-06-17

## 2025-06-17 RX ORDER — INDOMETHACIN 25 MG/1
CAPSULE ORAL DAILY PRN
Status: COMPLETED | OUTPATIENT
Start: 2025-06-17 | End: 2025-06-17

## 2025-06-17 RX ORDER — FENTANYL CITRATE-0.9 % NACL/PF 10 MCG/ML
25-200 PLASTIC BAG, INJECTION (ML) INTRAVENOUS CONTINUOUS
Refills: 0 | Status: DISCONTINUED | OUTPATIENT
Start: 2025-06-17 | End: 2025-06-18

## 2025-06-17 RX ORDER — FUROSEMIDE 10 MG/ML
INJECTION INTRAMUSCULAR; INTRAVENOUS
Status: DISPENSED
Start: 2025-06-17 | End: 2025-06-18

## 2025-06-17 RX ORDER — FENTANYL CITRATE 50 UG/ML
INJECTION, SOLUTION INTRAMUSCULAR; INTRAVENOUS
Status: COMPLETED
Start: 2025-06-17 | End: 2025-06-17

## 2025-06-17 RX ORDER — NOREPINEPHRINE BITARTRATE 0.06 MG/ML
.5-2 INJECTION, SOLUTION INTRAVENOUS CONTINUOUS
Status: DISCONTINUED | OUTPATIENT
Start: 2025-06-17 | End: 2025-06-19

## 2025-06-17 RX ORDER — POTASSIUM CHLORIDE 7.45 MG/ML
10 INJECTION INTRAVENOUS PRN
Status: DISCONTINUED | OUTPATIENT
Start: 2025-06-17 | End: 2025-06-25 | Stop reason: HOSPADM

## 2025-06-17 RX ORDER — ENOXAPARIN SODIUM 100 MG/ML
40 INJECTION SUBCUTANEOUS DAILY
Status: DISCONTINUED | OUTPATIENT
Start: 2025-06-18 | End: 2025-06-25 | Stop reason: HOSPADM

## 2025-06-17 RX ORDER — POLYETHYLENE GLYCOL 3350 17 G/17G
17 POWDER, FOR SOLUTION ORAL DAILY PRN
Status: DISCONTINUED | OUTPATIENT
Start: 2025-06-17 | End: 2025-06-25 | Stop reason: HOSPADM

## 2025-06-17 RX ORDER — NITROGLYCERIN 20 MG/100ML
INJECTION INTRAVENOUS
Status: DISPENSED
Start: 2025-06-17 | End: 2025-06-18

## 2025-06-17 RX ORDER — LIDOCAINE HCL/PF 100 MG/5ML
SYRINGE (ML) INJECTION DAILY PRN
Status: COMPLETED | OUTPATIENT
Start: 2025-06-17 | End: 2025-06-17

## 2025-06-17 RX ORDER — FUROSEMIDE 10 MG/ML
INJECTION INTRAMUSCULAR; INTRAVENOUS DAILY PRN
Status: COMPLETED | OUTPATIENT
Start: 2025-06-17 | End: 2025-06-17

## 2025-06-17 RX ORDER — NITROGLYCERIN 20 MG/100ML
5-200 INJECTION INTRAVENOUS CONTINUOUS
Status: DISCONTINUED | OUTPATIENT
Start: 2025-06-17 | End: 2025-06-19

## 2025-06-17 RX ORDER — ACETAMINOPHEN 650 MG/1
650 SUPPOSITORY RECTAL EVERY 6 HOURS PRN
Status: DISCONTINUED | OUTPATIENT
Start: 2025-06-17 | End: 2025-06-25 | Stop reason: HOSPADM

## 2025-06-17 RX ORDER — MAGNESIUM SULFATE IN WATER 40 MG/ML
2000 INJECTION, SOLUTION INTRAVENOUS PRN
Status: DISCONTINUED | OUTPATIENT
Start: 2025-06-17 | End: 2025-06-25 | Stop reason: HOSPADM

## 2025-06-17 RX ORDER — SODIUM CHLORIDE 9 MG/ML
INJECTION, SOLUTION INTRAVENOUS PRN
Status: DISCONTINUED | OUTPATIENT
Start: 2025-06-17 | End: 2025-06-25 | Stop reason: HOSPADM

## 2025-06-17 RX ORDER — ASPIRIN 81 MG/1
81 TABLET ORAL DAILY
Status: DISCONTINUED | OUTPATIENT
Start: 2025-06-18 | End: 2025-06-25 | Stop reason: HOSPADM

## 2025-06-17 RX ADMIN — EPINEPHRINE 1 MG: 0.1 INJECTION, SOLUTION ENDOTRACHEAL; INTRACARDIAC; INTRAVENOUS at 16:11

## 2025-06-17 RX ADMIN — PROPOFOL 40 MCG/KG/MIN: 10 INJECTION, EMULSION INTRAVENOUS at 19:55

## 2025-06-17 RX ADMIN — SODIUM BICARBONATE 50 MEQ: 84 INJECTION, SOLUTION INTRAVENOUS at 16:19

## 2025-06-17 RX ADMIN — SODIUM BICARBONATE 100 MEQ: 84 INJECTION, SOLUTION INTRAVENOUS at 16:28

## 2025-06-17 RX ADMIN — SODIUM CHLORIDE, PRESERVATIVE FREE 10 ML: 5 INJECTION INTRAVENOUS at 20:04

## 2025-06-17 RX ADMIN — NOREPINEPHRINE BITARTRATE 4 MCG/MIN: 64 SOLUTION INTRAVENOUS at 17:51

## 2025-06-17 RX ADMIN — FENTANYL CITRATE 50 MCG: 0.05 INJECTION, SOLUTION INTRAMUSCULAR; INTRAVENOUS at 18:51

## 2025-06-17 RX ADMIN — NITROGLYCERIN 100 MCG: 20 INJECTION INTRAVENOUS at 16:30

## 2025-06-17 RX ADMIN — PROPOFOL 1724 MCG: 10 INJECTION, EMULSION INTRAVENOUS at 16:29

## 2025-06-17 RX ADMIN — FUROSEMIDE 40 MG: 10 INJECTION, SOLUTION INTRAMUSCULAR; INTRAVENOUS at 16:26

## 2025-06-17 RX ADMIN — BUMETANIDE 2 MG: 0.25 INJECTION INTRAMUSCULAR; INTRAVENOUS at 18:00

## 2025-06-17 RX ADMIN — Medication 50 MCG/HR: at 18:57

## 2025-06-17 RX ADMIN — LIDOCAINE HYDROCHLORIDE 100 MG: 20 INJECTION, SOLUTION INTRAVENOUS at 16:19

## 2025-06-17 RX ADMIN — CALCIUM CHLORIDE INJECTION 1000 MG: 100 INJECTION, SOLUTION INTRAVENOUS at 16:17

## 2025-06-17 RX ADMIN — EPINEPHRINE 1 MG: 0.1 INJECTION, SOLUTION ENDOTRACHEAL; INTRACARDIAC; INTRAVENOUS at 16:15

## 2025-06-17 RX ADMIN — FENTANYL CITRATE 50 MCG: 50 INJECTION, SOLUTION INTRAMUSCULAR; INTRAVENOUS at 18:51

## 2025-06-17 ASSESSMENT — PULMONARY FUNCTION TESTS
PIF_VALUE: 27
PIF_VALUE: 25
PIF_VALUE: 33

## 2025-06-17 ASSESSMENT — PAIN SCALES - GENERAL: PAINLEVEL_OUTOF10: 6

## 2025-06-17 NOTE — ED PROVIDER NOTES
present.      Breath sounds: Rales present.   Neurological:      Comments: Moving all extremities, nods yes when asked if she would like to be on a breathing machine             EMERGENCY DEPARTMENT COURSE and DIFFERENTIAL DIAGNOSIS/MDM:   Vitals:    Vitals:    06/17/25 1620 06/17/25 1625 06/17/25 1626 06/17/25 1630   BP:   (!) 242/107    Pulse: (!) 159 (!) 158     Weight:    86.2 kg (190 lb)         Medical Decision Making  69-year-old male presents as above acute respiratory distress suspect flash pulmonary edema/hypertensive acute pulmonary edema.  Bedside ultrasound consistent with pulm edema.  She arrives in extremis with low sats and decreased respiratory drive.  She is on CPAP on arrival.  After moving the patient to the bed she became increasingly lethargic, lost pulses while preparing for intubation and emergently intubated during CPR.  She received 3 rounds of compressions, epi x 2.  She obtained ROSC with initial wide-complex tachycardia.  She was given lidocaine.  Converted to narrow complex tachycardia with hypertension.  She was started on high-dose nitroglycerin drip, propofol.  Intensivist bedside during most of the resuscitation.  She will be admitted to the ICU for further management.    Amount and/or Complexity of Data Reviewed  Labs: ordered.  Radiology: ordered.    Risk  Prescription drug management.  Decision regarding hospitalization.    Critical Care  Total time providing critical care: minutes (Critical Care  Performed by: Bhavesh Edwards MD  Authorized by: Bhavesh Edwards MD     Critical care provider statement:     Critical care time (minutes): 37    Critical care time was exclusive of:  Separately billable procedures and treating other patients    Critical care was necessary to treat or prevent imminent or life-threatening deterioration of the following conditions: Acute hypoxic and hypercapnic respiratory failure, flash pulm edema    Critical care was time spent personally by me on the

## 2025-06-17 NOTE — H&P
[facility] INITIAL ASSESSMENT.      Name: Tasha Kyle   : 1955   MRN: [id number]   Date: 2025        Chief Complaint   Patient presents with    Respiratory Distress         HPI:     69-year-old female with history significant for TIA, non-small cell lung cancer, anxiety, asthma, heart failure with preserved ejection fraction who presented to the emergency room with significant hypoxia.  Unfortunately, details are limited since family is not accompanying the patient.  Per report from emergency room, patient was noted to be significantly hypoxic with oxygen saturation in 60s.  She was given 1 L fluid initially, however, given worsening hypoxia and respiratory failure, she then received some Lasix.  Upon arrival to the emergency room, she was in significant respiratory distress and severely hypoxic.  She was intubated in the emergency room, however, developed cardiac arrest.  Intubation.  She underwent 3 cycles of CPR with ROSC.  Rhythm was PEA.  Postarrest, brief echocardiogram revealed normal LV function and mild pericardial effusion without any evidence of tamponade.  RV did not appear to be dilated or malfunctioning.  Labs were notable for significant metabolic acidosis, pH 6.9.  She received 3 A of bicarbonate.    Postarrest, patient was significantly hypertensive and started on nitroglycerin as well as propofol.  She had sinus tachycardia.  Initially, heart rate was around 170 with a potential wide-complex tachycardia, so she received 100 mg lidocaine.  Later, her heart rate was in 160s with sinus tachycardia.  Patient became hypoxic upon transitioning to ventilator.  PEEP was increased to 15 and tidal volume was increased to 450 along with increasing nighttime.  Oxygen saturation improved to 91%    Problem list:     Acute hypoxic and hypercapnic respiratory failure  Pulmonary edema  Acute decompensated congestive heart failure  Anemia  Leukocytosis  History of non-small cell

## 2025-06-17 NOTE — ED NOTES
Code blue called at this time.   Patient unresponsive, pulseless.See flow sheets for code narrator.

## 2025-06-17 NOTE — ED TRIAGE NOTES
Patient arrives to ER via EMS from [facility] with c/c of SOB.Patient currently on Bipap.  Per EMS the facility administered 1 L fluids. Patient reports SOB and 20 mg of Lasix prior to EMS arrival.   Oxygen level reading 57% upon EMS arrival. Patient placed on CPAP by EMS.   Patient was alert and tachypneic upon arrival.     MD at bedside.

## 2025-06-17 NOTE — ED NOTES
TRANSFER - OUT REPORT:    Verbal report given to ICU on Tasha Kyle  being transferred to 480 for routine progression of patient care       Report consisted of patient's Situation, Background, Assessment and   Recommendations(SBAR).     Information from the following report(s) Nurse Handoff Report, Index, ED Encounter Summary, ED SBAR, Adult Overview, Intake/Output, MAR, Recent Results, Quality Measures, Neuro Assessment, and Event Log was reviewed with the receiving nurse.           Lines:   Peripheral IV 06/17/25 Distal;Right;Anterior Forearm (Active)        Opportunity for questions and clarification was provided.      Patient transported with:  Monitor and Registered Nurse, Respiratory.

## 2025-06-18 ENCOUNTER — APPOINTMENT (OUTPATIENT)
Facility: HOSPITAL | Age: 70
DRG: 208 | End: 2025-06-18
Attending: HOSPITALIST
Payer: MEDICARE

## 2025-06-18 ENCOUNTER — APPOINTMENT (OUTPATIENT)
Facility: HOSPITAL | Age: 70
DRG: 208 | End: 2025-06-18
Payer: MEDICARE

## 2025-06-18 LAB
ALBUMIN SERPL-MCNC: 2.9 G/DL (ref 3.5–5)
ALBUMIN/GLOB SERPL: 1 (ref 1.1–2.2)
ALP SERPL-CCNC: 138 U/L (ref 45–117)
ALT SERPL-CCNC: 73 U/L (ref 12–78)
ANION GAP SERPL CALC-SCNC: 5 MMOL/L (ref 2–12)
ANION GAP SERPL CALC-SCNC: 6 MMOL/L (ref 2–12)
APPEARANCE UR: CLEAR
ARTERIAL PATENCY WRIST A: POSITIVE
AST SERPL-CCNC: 86 U/L (ref 15–37)
BACTERIA URNS QL MICRO: ABNORMAL /HPF
BASE EXCESS BLD CALC-SCNC: 6 MMOL/L
BASOPHILS # BLD: 0 K/UL (ref 0–0.1)
BASOPHILS NFR BLD: 0 % (ref 0–1)
BDY SITE: ABNORMAL
BILIRUB DIRECT SERPL-MCNC: 0.1 MG/DL (ref 0–0.2)
BILIRUB SERPL-MCNC: 0.3 MG/DL (ref 0.2–1)
BILIRUB UR QL: NEGATIVE
BUN SERPL-MCNC: 20 MG/DL (ref 6–20)
BUN SERPL-MCNC: 21 MG/DL (ref 6–20)
BUN/CREAT SERPL: 14 (ref 12–20)
BUN/CREAT SERPL: 15 (ref 12–20)
CALCIUM SERPL-MCNC: 9 MG/DL (ref 8.5–10.1)
CALCIUM SERPL-MCNC: 9.2 MG/DL (ref 8.5–10.1)
CHLORIDE SERPL-SCNC: 107 MMOL/L (ref 97–108)
CHLORIDE SERPL-SCNC: 109 MMOL/L (ref 97–108)
CO2 SERPL-SCNC: 29 MMOL/L (ref 21–32)
CO2 SERPL-SCNC: 32 MMOL/L (ref 21–32)
COLOR UR: ABNORMAL
CREAT SERPL-MCNC: 1.4 MG/DL (ref 0.55–1.02)
CREAT SERPL-MCNC: 1.41 MG/DL (ref 0.55–1.02)
DIFFERENTIAL METHOD BLD: ABNORMAL
ECHO BSA: 1.94 M2
ECHO LV EDV A2C: 53 ML
ECHO LV EDV A4C: 87 ML
ECHO LV EDV BP: 71 ML (ref 56–104)
ECHO LV EDV INDEX A4C: 47 ML/M2
ECHO LV EDV INDEX BP: 38 ML/M2
ECHO LV EDV NDEX A2C: 28 ML/M2
ECHO LV EF PHYSICIAN: 45 %
ECHO LV EJECTION FRACTION A2C: 23 %
ECHO LV EJECTION FRACTION A4C: 64 %
ECHO LV EJECTION FRACTION BIPLANE: 47 % (ref 55–100)
ECHO LV ESV A2C: 41 ML
ECHO LV ESV A4C: 32 ML
ECHO LV ESV BP: 38 ML (ref 19–49)
ECHO LV ESV INDEX A2C: 22 ML/M2
ECHO LV ESV INDEX A4C: 17 ML/M2
ECHO LV ESV INDEX BP: 20 ML/M2
ECHO LV FRACTIONAL SHORTENING: 34 % (ref 28–44)
ECHO LV INTERNAL DIMENSION DIASTOLE INDEX: 2.35 CM/M2
ECHO LV INTERNAL DIMENSION DIASTOLIC: 4.4 CM (ref 3.9–5.3)
ECHO LV INTERNAL DIMENSION SYSTOLIC INDEX: 1.55 CM/M2
ECHO LV INTERNAL DIMENSION SYSTOLIC: 2.9 CM
ECHO LV IVSD: 1.2 CM (ref 0.6–0.9)
ECHO LV MASS 2D: 215.1 G (ref 67–162)
ECHO LV MASS INDEX 2D: 115 G/M2 (ref 43–95)
ECHO LV POSTERIOR WALL DIASTOLIC: 1.4 CM (ref 0.6–0.9)
ECHO LV RELATIVE WALL THICKNESS RATIO: 0.64
ECHO RV INTERNAL DIMENSION: 4.2 CM
ECHO RV TAPSE: 2 CM (ref 1.7–?)
ECHO TV REGURGITANT MAX VELOCITY: 2.15 M/S
ECHO TV REGURGITANT PEAK GRADIENT: 18 MMHG
EKG ATRIAL RATE: 90 BPM
EKG DIAGNOSIS: NORMAL
EKG P AXIS: 74 DEGREES
EKG P-R INTERVAL: 148 MS
EKG Q-T INTERVAL: 392 MS
EKG QRS DURATION: 78 MS
EKG QTC CALCULATION (BAZETT): 479 MS
EKG R AXIS: 13 DEGREES
EKG T AXIS: 146 DEGREES
EKG VENTRICULAR RATE: 90 BPM
EOSINOPHIL # BLD: 0 K/UL (ref 0–0.4)
EOSINOPHIL NFR BLD: 0 % (ref 0–7)
EPITH CASTS URNS QL MICRO: ABNORMAL /LPF
ERYTHROCYTE [DISTWIDTH] IN BLOOD BY AUTOMATED COUNT: 19 % (ref 11.5–14.5)
GAS FLOW.O2 O2 DELIVERY SYS: ABNORMAL
GAS FLOW.O2 SETTING OXYMISER: 18 BPM
GLOBULIN SER CALC-MCNC: 3 G/DL (ref 2–4)
GLUCOSE SERPL-MCNC: 146 MG/DL (ref 65–100)
GLUCOSE SERPL-MCNC: 163 MG/DL (ref 65–100)
GLUCOSE UR STRIP.AUTO-MCNC: NEGATIVE MG/DL
HCO3 BLD-SCNC: 29.1 MMOL/L (ref 21–28)
HCT VFR BLD AUTO: 24.7 % (ref 35–47)
HGB BLD-MCNC: 7.8 G/DL (ref 11.5–16)
HGB UR QL STRIP: NEGATIVE
HYALINE CASTS URNS QL MICRO: ABNORMAL /LPF (ref 0–2)
IMM GRANULOCYTES # BLD AUTO: 0 K/UL
IMM GRANULOCYTES NFR BLD AUTO: 0 %
KETONES UR QL STRIP.AUTO: NEGATIVE MG/DL
LACTATE SERPL-SCNC: 1.5 MMOL/L (ref 0.4–2)
LEUKOCYTE ESTERASE UR QL STRIP.AUTO: ABNORMAL
LYMPHOCYTES # BLD: 1.35 K/UL (ref 0.8–3.5)
LYMPHOCYTES NFR BLD: 6 % (ref 12–49)
MAGNESIUM SERPL-MCNC: 1.7 MG/DL (ref 1.6–2.4)
MCH RBC QN AUTO: 31.3 PG (ref 26–34)
MCHC RBC AUTO-ENTMCNC: 31.6 G/DL (ref 30–36.5)
MCV RBC AUTO: 99.2 FL (ref 80–99)
MONOCYTES # BLD: 1.58 K/UL (ref 0–1)
MONOCYTES NFR BLD: 7 % (ref 5–13)
NEUTS SEG # BLD: 19.57 K/UL (ref 1.8–8)
NEUTS SEG NFR BLD: 87 % (ref 32–75)
NITRITE UR QL STRIP.AUTO: NEGATIVE
NRBC # BLD: 0 K/UL (ref 0–0.01)
NRBC BLD-RTO: 0 PER 100 WBC
O2/TOTAL GAS SETTING VFR VENT: 50 %
PCO2 BLD: 34.6 MMHG (ref 35–48)
PEEP RESPIRATORY: 12 CMH2O
PH BLD: 7.53 (ref 7.35–7.45)
PH UR STRIP: 7 (ref 5–8)
PLATELET # BLD AUTO: 500 K/UL (ref 150–400)
PMV BLD AUTO: 9.2 FL (ref 8.9–12.9)
PO2 BLD: 183 MMHG (ref 83–108)
POTASSIUM SERPL-SCNC: 3.4 MMOL/L (ref 3.5–5.1)
POTASSIUM SERPL-SCNC: 3.8 MMOL/L (ref 3.5–5.1)
PROCALCITONIN SERPL-MCNC: 17.33 NG/ML
PROT SERPL-MCNC: 5.9 G/DL (ref 6.4–8.2)
PROT UR STRIP-MCNC: NEGATIVE MG/DL
RBC # BLD AUTO: 2.49 M/UL (ref 3.8–5.2)
RBC #/AREA URNS HPF: ABNORMAL /HPF (ref 0–5)
RBC MORPH BLD: ABNORMAL
SAO2 % BLD: 99.7 % (ref 92–97)
SODIUM SERPL-SCNC: 144 MMOL/L (ref 136–145)
SODIUM SERPL-SCNC: 144 MMOL/L (ref 136–145)
SP GR UR REFRACTOMETRY: 1.01 (ref 1–1.03)
SPECIMEN TYPE: ABNORMAL
TROPONIN I SERPL HS-MCNC: 4951 NG/L (ref 0–51)
TROPONIN I SERPL HS-MCNC: 6085 NG/L (ref 0–51)
URINE CULTURE IF INDICATED: ABNORMAL
UROBILINOGEN UR QL STRIP.AUTO: 0.2 EU/DL (ref 0.2–1)
VENTILATION MODE VENT: ABNORMAL
VT SETTING VENT: 450 ML
WBC # BLD AUTO: 22.5 K/UL (ref 3.6–11)
WBC URNS QL MICRO: ABNORMAL /HPF (ref 0–4)

## 2025-06-18 PROCEDURE — 94003 VENT MGMT INPAT SUBQ DAY: CPT

## 2025-06-18 PROCEDURE — 81001 URINALYSIS AUTO W/SCOPE: CPT

## 2025-06-18 PROCEDURE — 6370000000 HC RX 637 (ALT 250 FOR IP): Performed by: HOSPITALIST

## 2025-06-18 PROCEDURE — 84145 PROCALCITONIN (PCT): CPT

## 2025-06-18 PROCEDURE — 6360000002 HC RX W HCPCS: Performed by: HOSPITALIST

## 2025-06-18 PROCEDURE — 5A0935A ASSISTANCE WITH RESPIRATORY VENTILATION, LESS THAN 24 CONSECUTIVE HOURS, HIGH NASAL FLOW/VELOCITY: ICD-10-PCS | Performed by: HOSPITALIST

## 2025-06-18 PROCEDURE — 94640 AIRWAY INHALATION TREATMENT: CPT

## 2025-06-18 PROCEDURE — 2000000000 HC ICU R&B

## 2025-06-18 PROCEDURE — 2700000000 HC OXYGEN THERAPY PER DAY

## 2025-06-18 PROCEDURE — 71045 X-RAY EXAM CHEST 1 VIEW: CPT

## 2025-06-18 PROCEDURE — 80076 HEPATIC FUNCTION PANEL: CPT

## 2025-06-18 PROCEDURE — 82803 BLOOD GASES ANY COMBINATION: CPT

## 2025-06-18 PROCEDURE — 2500000003 HC RX 250 WO HCPCS: Performed by: HOSPITALIST

## 2025-06-18 PROCEDURE — 93010 ELECTROCARDIOGRAM REPORT: CPT | Performed by: STUDENT IN AN ORGANIZED HEALTH CARE EDUCATION/TRAINING PROGRAM

## 2025-06-18 PROCEDURE — 93321 DOPPLER ECHO F-UP/LMTD STD: CPT

## 2025-06-18 PROCEDURE — 94761 N-INVAS EAR/PLS OXIMETRY MLT: CPT

## 2025-06-18 PROCEDURE — 85025 COMPLETE CBC W/AUTO DIFF WBC: CPT

## 2025-06-18 PROCEDURE — 94660 CPAP INITIATION&MGMT: CPT

## 2025-06-18 PROCEDURE — 80048 BASIC METABOLIC PNL TOTAL CA: CPT

## 2025-06-18 PROCEDURE — 93325 DOPPLER ECHO COLOR FLOW MAPG: CPT | Performed by: INTERNAL MEDICINE

## 2025-06-18 PROCEDURE — 93308 TTE F-UP OR LMTD: CPT | Performed by: INTERNAL MEDICINE

## 2025-06-18 PROCEDURE — 6360000002 HC RX W HCPCS: Performed by: PHYSICIAN ASSISTANT

## 2025-06-18 PROCEDURE — 36600 WITHDRAWAL OF ARTERIAL BLOOD: CPT

## 2025-06-18 PROCEDURE — 93005 ELECTROCARDIOGRAM TRACING: CPT | Performed by: EMERGENCY MEDICINE

## 2025-06-18 PROCEDURE — 84484 ASSAY OF TROPONIN QUANT: CPT

## 2025-06-18 PROCEDURE — 83735 ASSAY OF MAGNESIUM: CPT

## 2025-06-18 PROCEDURE — 83605 ASSAY OF LACTIC ACID: CPT

## 2025-06-18 PROCEDURE — 2580000003 HC RX 258: Performed by: HOSPITALIST

## 2025-06-18 PROCEDURE — 36415 COLL VENOUS BLD VENIPUNCTURE: CPT

## 2025-06-18 PROCEDURE — 93321 DOPPLER ECHO F-UP/LMTD STD: CPT | Performed by: INTERNAL MEDICINE

## 2025-06-18 RX ORDER — ALBUTEROL SULFATE 0.83 MG/ML
2.5 SOLUTION RESPIRATORY (INHALATION)
Status: DISPENSED | OUTPATIENT
Start: 2025-06-18 | End: 2025-06-19

## 2025-06-18 RX ORDER — POTASSIUM CHLORIDE 7.45 MG/ML
10 INJECTION INTRAVENOUS
Status: DISPENSED | OUTPATIENT
Start: 2025-06-18 | End: 2025-06-18

## 2025-06-18 RX ORDER — BUDESONIDE 0.5 MG/2ML
0.5 INHALANT ORAL
Status: DISCONTINUED | OUTPATIENT
Start: 2025-06-18 | End: 2025-06-25 | Stop reason: HOSPADM

## 2025-06-18 RX ORDER — FENTANYL CITRATE-0.9 % NACL/PF 10 MCG/ML
25-200 PLASTIC BAG, INJECTION (ML) INTRAVENOUS CONTINUOUS
Refills: 0 | Status: DISCONTINUED | OUTPATIENT
Start: 2025-06-18 | End: 2025-06-19

## 2025-06-18 RX ORDER — FENTANYL CITRATE-0.9 % NACL/PF 20 MCG/2ML
50 SYRINGE (ML) INTRAVENOUS EVERY 30 MIN PRN
Refills: 0 | Status: DISCONTINUED | OUTPATIENT
Start: 2025-06-18 | End: 2025-06-19

## 2025-06-18 RX ORDER — BUMETANIDE 0.25 MG/ML
2 INJECTION, SOLUTION INTRAMUSCULAR; INTRAVENOUS ONCE
Status: COMPLETED | OUTPATIENT
Start: 2025-06-18 | End: 2025-06-18

## 2025-06-18 RX ORDER — DEXMEDETOMIDINE HYDROCHLORIDE 4 UG/ML
.1-1.5 INJECTION, SOLUTION INTRAVENOUS CONTINUOUS
Status: DISCONTINUED | OUTPATIENT
Start: 2025-06-18 | End: 2025-06-19

## 2025-06-18 RX ORDER — BUMETANIDE 0.25 MG/ML
2 INJECTION, SOLUTION INTRAMUSCULAR; INTRAVENOUS 2 TIMES DAILY
Status: DISCONTINUED | OUTPATIENT
Start: 2025-06-18 | End: 2025-06-19

## 2025-06-18 RX ADMIN — BUMETANIDE 2 MG: 0.25 INJECTION INTRAMUSCULAR; INTRAVENOUS at 21:32

## 2025-06-18 RX ADMIN — POTASSIUM CHLORIDE 10 MEQ: 7.46 INJECTION, SOLUTION INTRAVENOUS at 18:57

## 2025-06-18 RX ADMIN — Medication 75 MCG/HR: at 01:44

## 2025-06-18 RX ADMIN — PROPOFOL 40 MCG/KG/MIN: 10 INJECTION, EMULSION INTRAVENOUS at 05:10

## 2025-06-18 RX ADMIN — PIPERACILLIN AND TAZOBACTAM 4500 MG: 4; .5 INJECTION, POWDER, FOR SOLUTION INTRAVENOUS at 18:57

## 2025-06-18 RX ADMIN — VANCOMYCIN HYDROCHLORIDE 2250 MG: 10 INJECTION, POWDER, LYOPHILIZED, FOR SOLUTION INTRAVENOUS at 18:16

## 2025-06-18 RX ADMIN — POTASSIUM CHLORIDE 10 MEQ: 7.46 INJECTION, SOLUTION INTRAVENOUS at 16:23

## 2025-06-18 RX ADMIN — ALBUTEROL SULFATE 2.5 MG: 2.5 SOLUTION RESPIRATORY (INHALATION) at 19:25

## 2025-06-18 RX ADMIN — PROPOFOL 40 MCG/KG/MIN: 10 INJECTION, EMULSION INTRAVENOUS at 00:36

## 2025-06-18 RX ADMIN — PROPOFOL 30 MCG/KG/MIN: 10 INJECTION, EMULSION INTRAVENOUS at 10:15

## 2025-06-18 RX ADMIN — BUMETANIDE 2 MG: 0.25 INJECTION INTRAMUSCULAR; INTRAVENOUS at 08:08

## 2025-06-18 RX ADMIN — ASPIRIN 81 MG: 81 TABLET, COATED ORAL at 08:08

## 2025-06-18 RX ADMIN — BUDESONIDE 500 MCG: 0.5 INHALANT RESPIRATORY (INHALATION) at 19:25

## 2025-06-18 RX ADMIN — Medication 50 MCG: at 01:45

## 2025-06-18 RX ADMIN — DEXMEDETOMIDINE HYDROCHLORIDE 0.4 MCG/KG/HR: 400 INJECTION INTRAVENOUS at 08:02

## 2025-06-18 RX ADMIN — POTASSIUM CHLORIDE 10 MEQ: 7.46 INJECTION, SOLUTION INTRAVENOUS at 17:35

## 2025-06-18 RX ADMIN — NOREPINEPHRINE BITARTRATE 8 MCG/MIN: 64 SOLUTION INTRAVENOUS at 00:39

## 2025-06-18 RX ADMIN — Medication 50 MCG/HR: at 04:18

## 2025-06-18 RX ADMIN — DEXMEDETOMIDINE HYDROCHLORIDE 0.8 MCG/KG/HR: 400 INJECTION INTRAVENOUS at 16:12

## 2025-06-18 RX ADMIN — SODIUM CHLORIDE, PRESERVATIVE FREE 10 ML: 5 INJECTION INTRAVENOUS at 08:08

## 2025-06-18 RX ADMIN — DEXMEDETOMIDINE HYDROCHLORIDE 1 MCG/KG/HR: 400 INJECTION INTRAVENOUS at 22:05

## 2025-06-18 RX ADMIN — ENOXAPARIN SODIUM 40 MG: 100 INJECTION SUBCUTANEOUS at 08:30

## 2025-06-18 RX ADMIN — POTASSIUM CHLORIDE 10 MEQ: 7.46 INJECTION, SOLUTION INTRAVENOUS at 20:04

## 2025-06-18 RX ADMIN — SODIUM CHLORIDE, PRESERVATIVE FREE 10 ML: 5 INJECTION INTRAVENOUS at 21:32

## 2025-06-18 ASSESSMENT — PULMONARY FUNCTION TESTS
PIF_VALUE: 21
PIF_VALUE: 23
PIF_VALUE: 10
PIF_VALUE: 26

## 2025-06-18 ASSESSMENT — PAIN SCALES - GENERAL
PAINLEVEL_OUTOF10: 0
PAINLEVEL_OUTOF10: 0

## 2025-06-18 NOTE — CARE COORDINATION
06/18/25 6884   Service Assessment   Patient Orientation Alert and Oriented;Other (see comment)  (shaking her head appropriately to questions but intubated so not able to answer fully,niece interviwed)   Cognition Alert   History Provided By Child/Family  (niece-Waleska li)   Primary Caregiver Self   Support Systems Family Members   Patient's Healthcare Decision Maker is: Legal Next of Kin  (JAZMINEK -[place])   PCP Verified by CM Yes  (Dr Liliane Martin)   Last Visit to PCP Within last 3 months   Prior Functional Level Assistance with the following:;Mobility;Dressing;Shopping  (uses a cane to ambulate)   Current Functional Level Assistance with the following:;Bathing;Dressing;Toileting;Housework;Shopping;Cooking;Mobility  (while intubated)   Can patient return to prior living arrangement Yes   Ability to make needs known: Fair  (due to being intubated,is nodding appropriately to questions)   Family able to assist with home care needs: Yes  (niece checks on aunt daily)   Would you like for me to discuss the discharge plan with any other family members/significant others, and if so, who? Yes   Financial Resources Medicare   Community Resources None   CM/SW Referral Disease Management Education   Social/Functional History   Lives With Alone   Type of Home House   Home Layout One level   Home Access Stairs to enter with rails   Entrance Stairs - Number of Steps 3 entry   Entrance Stairs - Rails Both   Bathroom Shower/Tub Walk-in shower   Bathroom Toilet Standard   Bathroom Equipment Grab bars in shower   Home Equipment Cane   Receives Help From Family   Prior Level of Assist for ADLs Needs assistance   Bath Independent   Dressing Minimal assistance  (needs assistance with pulling up underwear and pants,help with putting on socks)   Grooming Independent   Feeding Independent   Toileting Independent   Prior Level of Assist for Homemaking Independent   Homemaking Responsibilities Yes   Ambulation Assistance Needs

## 2025-06-19 ENCOUNTER — APPOINTMENT (OUTPATIENT)
Facility: HOSPITAL | Age: 70
DRG: 208 | End: 2025-06-19
Payer: MEDICARE

## 2025-06-19 LAB
ALBUMIN SERPL-MCNC: 2.9 G/DL (ref 3.5–5)
ALBUMIN/GLOB SERPL: 0.9 (ref 1.1–2.2)
ALP SERPL-CCNC: 118 U/L (ref 45–117)
ALT SERPL-CCNC: 54 U/L (ref 12–78)
ANION GAP SERPL CALC-SCNC: 3 MMOL/L (ref 2–12)
ARTERIAL PATENCY WRIST A: YES
AST SERPL-CCNC: 45 U/L (ref 15–37)
B PERT DNA SPEC QL NAA+PROBE: NOT DETECTED
BASE EXCESS BLDA CALC-SCNC: 4.4 MMOL/L
BASOPHILS # BLD: 0.08 K/UL (ref 0–0.1)
BASOPHILS NFR BLD: 0.6 % (ref 0–1)
BDY SITE: ABNORMAL
BILIRUB DIRECT SERPL-MCNC: 0.1 MG/DL (ref 0–0.2)
BILIRUB SERPL-MCNC: 0.5 MG/DL (ref 0.2–1)
BORDETELLA PARAPERTUSSIS BY PCR: NOT DETECTED
BUN SERPL-MCNC: 22 MG/DL (ref 6–20)
BUN/CREAT SERPL: 15 (ref 12–20)
C PNEUM DNA SPEC QL NAA+PROBE: NOT DETECTED
CALCIUM SERPL-MCNC: 8.7 MG/DL (ref 8.5–10.1)
CHLORIDE SERPL-SCNC: 109 MMOL/L (ref 97–108)
CO2 SERPL-SCNC: 32 MMOL/L (ref 21–32)
CREAT SERPL-MCNC: 1.45 MG/DL (ref 0.55–1.02)
DIFFERENTIAL METHOD BLD: ABNORMAL
EKG ATRIAL RATE: 137 BPM
EKG ATRIAL RATE: 141 BPM
EKG DIAGNOSIS: NORMAL
EKG DIAGNOSIS: NORMAL
EKG P AXIS: 61 DEGREES
EKG P AXIS: 66 DEGREES
EKG P-R INTERVAL: 104 MS
EKG P-R INTERVAL: 118 MS
EKG Q-T INTERVAL: 364 MS
EKG Q-T INTERVAL: 372 MS
EKG QRS DURATION: 74 MS
EKG QRS DURATION: 84 MS
EKG QTC CALCULATION (BAZETT): 557 MS
EKG QTC CALCULATION (BAZETT): 561 MS
EKG R AXIS: -1 DEGREES
EKG R AXIS: 51 DEGREES
EKG T AXIS: 91 DEGREES
EKG T AXIS: 94 DEGREES
EKG VENTRICULAR RATE: 137 BPM
EKG VENTRICULAR RATE: 141 BPM
EOSINOPHIL # BLD: 0.23 K/UL (ref 0–0.4)
EOSINOPHIL NFR BLD: 1.6 % (ref 0–7)
ERYTHROCYTE [DISTWIDTH] IN BLOOD BY AUTOMATED COUNT: 18.7 % (ref 11.5–14.5)
EST. AVERAGE GLUCOSE BLD GHB EST-MCNC: 117 MG/DL
FERRITIN SERPL-MCNC: 526 NG/ML (ref 26–388)
FLUAV SUBTYP SPEC NAA+PROBE: NOT DETECTED
FLUBV RNA SPEC QL NAA+PROBE: NOT DETECTED
FOLATE SERPL-MCNC: 52.5 NG/ML (ref 5–21)
GLOBULIN SER CALC-MCNC: 3.2 G/DL (ref 2–4)
GLUCOSE BLD STRIP.AUTO-MCNC: 136 MG/DL (ref 65–117)
GLUCOSE BLD STRIP.AUTO-MCNC: 206 MG/DL (ref 65–117)
GLUCOSE SERPL-MCNC: 148 MG/DL (ref 65–100)
HADV DNA SPEC QL NAA+PROBE: NOT DETECTED
HAPTOGLOB SERPL-MCNC: 102 MG/DL (ref 30–200)
HBA1C MFR BLD: 5.7 % (ref 4–5.6)
HCO3 BLDA-SCNC: 29 MMOL/L (ref 22–26)
HCOV 229E RNA SPEC QL NAA+PROBE: NOT DETECTED
HCOV HKU1 RNA SPEC QL NAA+PROBE: NOT DETECTED
HCOV NL63 RNA SPEC QL NAA+PROBE: NOT DETECTED
HCOV OC43 RNA SPEC QL NAA+PROBE: NOT DETECTED
HCT VFR BLD AUTO: 23.2 % (ref 35–47)
HCT VFR BLD AUTO: 27.3 % (ref 35–47)
HGB BLD-MCNC: 7.1 G/DL (ref 11.5–16)
HGB BLD-MCNC: 8.2 G/DL (ref 11.5–16)
HMPV RNA SPEC QL NAA+PROBE: NOT DETECTED
HPIV1 RNA SPEC QL NAA+PROBE: NOT DETECTED
HPIV2 RNA SPEC QL NAA+PROBE: NOT DETECTED
HPIV3 RNA SPEC QL NAA+PROBE: NOT DETECTED
HPIV4 RNA SPEC QL NAA+PROBE: NOT DETECTED
IMM GRANULOCYTES # BLD AUTO: 0.11 K/UL (ref 0–0.04)
IMM GRANULOCYTES NFR BLD AUTO: 0.8 % (ref 0–0.5)
IRON SATN MFR SERPL: 5 % (ref 20–50)
IRON SERPL-MCNC: 12 UG/DL (ref 35–150)
LDH SERPL L TO P-CCNC: 403 U/L (ref 81–246)
LYMPHOCYTES # BLD: 1.37 K/UL (ref 0.8–3.5)
LYMPHOCYTES NFR BLD: 9.8 % (ref 12–49)
M PNEUMO DNA SPEC QL NAA+PROBE: NOT DETECTED
MAGNESIUM SERPL-MCNC: 1.8 MG/DL (ref 1.6–2.4)
MCH RBC QN AUTO: 31 PG (ref 26–34)
MCHC RBC AUTO-ENTMCNC: 30.6 G/DL (ref 30–36.5)
MCV RBC AUTO: 101.3 FL (ref 80–99)
MONOCYTES # BLD: 1.5 K/UL (ref 0–1)
MONOCYTES NFR BLD: 10.7 % (ref 5–13)
NEUTS SEG # BLD: 10.76 K/UL (ref 1.8–8)
NEUTS SEG NFR BLD: 76.5 % (ref 32–75)
NRBC # BLD: 0 K/UL (ref 0–0.01)
NRBC BLD-RTO: 0 PER 100 WBC
PCO2 BLDA: 43 MMHG (ref 35–45)
PH BLDA: 7.45 (ref 7.35–7.45)
PLATELET # BLD AUTO: 335 K/UL (ref 150–400)
PMV BLD AUTO: 9.2 FL (ref 8.9–12.9)
PO2 BLDA: 83 MMHG (ref 80–100)
POTASSIUM SERPL-SCNC: 3.6 MMOL/L (ref 3.5–5.1)
PROT SERPL-MCNC: 6.1 G/DL (ref 6.4–8.2)
RBC # BLD AUTO: 2.29 M/UL (ref 3.8–5.2)
RETICS # AUTO: 0.1 M/UL (ref 0.02–0.08)
RETICS/RBC NFR AUTO: 4 % (ref 0.7–2.1)
RSV RNA SPEC QL NAA+PROBE: NOT DETECTED
RV+EV RNA SPEC QL NAA+PROBE: NOT DETECTED
SAO2 % BLD: 97 % (ref 92–97)
SAO2% DEVICE SAO2% SENSOR NAME: ABNORMAL
SARS-COV-2 RNA RESP QL NAA+PROBE: NOT DETECTED
SERVICE CMNT-IMP: ABNORMAL
SERVICE CMNT-IMP: ABNORMAL
SODIUM SERPL-SCNC: 144 MMOL/L (ref 136–145)
SPECIMEN SITE: ABNORMAL
TIBC SERPL-MCNC: 266 UG/DL (ref 250–450)
VIT B12 SERPL-MCNC: 1318 PG/ML (ref 193–986)
WBC # BLD AUTO: 14.1 K/UL (ref 3.6–11)

## 2025-06-19 PROCEDURE — 97161 PT EVAL LOW COMPLEX 20 MIN: CPT

## 2025-06-19 PROCEDURE — 6370000000 HC RX 637 (ALT 250 FOR IP): Performed by: NURSE PRACTITIONER

## 2025-06-19 PROCEDURE — 83036 HEMOGLOBIN GLYCOSYLATED A1C: CPT

## 2025-06-19 PROCEDURE — 6360000002 HC RX W HCPCS: Performed by: HOSPITALIST

## 2025-06-19 PROCEDURE — 82746 ASSAY OF FOLIC ACID SERUM: CPT

## 2025-06-19 PROCEDURE — 97165 OT EVAL LOW COMPLEX 30 MIN: CPT

## 2025-06-19 PROCEDURE — 82803 BLOOD GASES ANY COMBINATION: CPT

## 2025-06-19 PROCEDURE — 6360000002 HC RX W HCPCS: Performed by: NURSE PRACTITIONER

## 2025-06-19 PROCEDURE — 6360000002 HC RX W HCPCS: Performed by: STUDENT IN AN ORGANIZED HEALTH CARE EDUCATION/TRAINING PROGRAM

## 2025-06-19 PROCEDURE — 87449 NOS EACH ORGANISM AG IA: CPT

## 2025-06-19 PROCEDURE — 2580000003 HC RX 258: Performed by: HOSPITALIST

## 2025-06-19 PROCEDURE — 87186 SC STD MICRODIL/AGAR DIL: CPT

## 2025-06-19 PROCEDURE — 80076 HEPATIC FUNCTION PANEL: CPT

## 2025-06-19 PROCEDURE — 85014 HEMATOCRIT: CPT

## 2025-06-19 PROCEDURE — 92612 ENDOSCOPY SWALLOW (FEES) VID: CPT | Performed by: SPEECH-LANGUAGE PATHOLOGIST

## 2025-06-19 PROCEDURE — 94660 CPAP INITIATION&MGMT: CPT

## 2025-06-19 PROCEDURE — 94640 AIRWAY INHALATION TREATMENT: CPT

## 2025-06-19 PROCEDURE — 85018 HEMOGLOBIN: CPT

## 2025-06-19 PROCEDURE — 5A09357 ASSISTANCE WITH RESPIRATORY VENTILATION, LESS THAN 24 CONSECUTIVE HOURS, CONTINUOUS POSITIVE AIRWAY PRESSURE: ICD-10-PCS | Performed by: HOSPITALIST

## 2025-06-19 PROCEDURE — 70553 MRI BRAIN STEM W/O & W/DYE: CPT

## 2025-06-19 PROCEDURE — 94761 N-INVAS EAR/PLS OXIMETRY MLT: CPT

## 2025-06-19 PROCEDURE — 36415 COLL VENOUS BLD VENIPUNCTURE: CPT

## 2025-06-19 PROCEDURE — 97112 NEUROMUSCULAR REEDUCATION: CPT

## 2025-06-19 PROCEDURE — 93010 ELECTROCARDIOGRAM REPORT: CPT | Performed by: SPECIALIST

## 2025-06-19 PROCEDURE — 87088 URINE BACTERIA CULTURE: CPT

## 2025-06-19 PROCEDURE — 6370000000 HC RX 637 (ALT 250 FOR IP): Performed by: HOSPITALIST

## 2025-06-19 PROCEDURE — 83540 ASSAY OF IRON: CPT

## 2025-06-19 PROCEDURE — 84484 ASSAY OF TROPONIN QUANT: CPT

## 2025-06-19 PROCEDURE — 83615 LACTATE (LD) (LDH) ENZYME: CPT

## 2025-06-19 PROCEDURE — 0202U NFCT DS 22 TRGT SARS-COV-2: CPT

## 2025-06-19 PROCEDURE — 94669 MECHANICAL CHEST WALL OSCILL: CPT

## 2025-06-19 PROCEDURE — 83550 IRON BINDING TEST: CPT

## 2025-06-19 PROCEDURE — 83010 ASSAY OF HAPTOGLOBIN QUANT: CPT

## 2025-06-19 PROCEDURE — 87899 AGENT NOS ASSAY W/OPTIC: CPT

## 2025-06-19 PROCEDURE — 86738 MYCOPLASMA ANTIBODY: CPT

## 2025-06-19 PROCEDURE — 99222 1ST HOSP IP/OBS MODERATE 55: CPT | Performed by: INTERNAL MEDICINE

## 2025-06-19 PROCEDURE — 71250 CT THORAX DX C-: CPT

## 2025-06-19 PROCEDURE — 2580000003 HC RX 258: Performed by: STUDENT IN AN ORGANIZED HEALTH CARE EDUCATION/TRAINING PROGRAM

## 2025-06-19 PROCEDURE — A9579 GAD-BASE MR CONTRAST NOS,1ML: HCPCS | Performed by: RADIOLOGY

## 2025-06-19 PROCEDURE — 97530 THERAPEUTIC ACTIVITIES: CPT

## 2025-06-19 PROCEDURE — 2000000000 HC ICU R&B

## 2025-06-19 PROCEDURE — 82962 GLUCOSE BLOOD TEST: CPT

## 2025-06-19 PROCEDURE — 82728 ASSAY OF FERRITIN: CPT

## 2025-06-19 PROCEDURE — 80048 BASIC METABOLIC PNL TOTAL CA: CPT

## 2025-06-19 PROCEDURE — 2500000003 HC RX 250 WO HCPCS: Performed by: HOSPITALIST

## 2025-06-19 PROCEDURE — 6360000004 HC RX CONTRAST MEDICATION: Performed by: RADIOLOGY

## 2025-06-19 PROCEDURE — 36600 WITHDRAWAL OF ARTERIAL BLOOD: CPT

## 2025-06-19 PROCEDURE — 87086 URINE CULTURE/COLONY COUNT: CPT

## 2025-06-19 PROCEDURE — 87040 BLOOD CULTURE FOR BACTERIA: CPT

## 2025-06-19 PROCEDURE — 71045 X-RAY EXAM CHEST 1 VIEW: CPT

## 2025-06-19 PROCEDURE — 83735 ASSAY OF MAGNESIUM: CPT

## 2025-06-19 PROCEDURE — 85025 COMPLETE CBC W/AUTO DIFF WBC: CPT

## 2025-06-19 PROCEDURE — 92610 EVALUATE SWALLOWING FUNCTION: CPT

## 2025-06-19 PROCEDURE — 94667 MNPJ CHEST WALL 1ST: CPT

## 2025-06-19 PROCEDURE — 82607 VITAMIN B-12: CPT

## 2025-06-19 PROCEDURE — 85045 AUTOMATED RETICULOCYTE COUNT: CPT

## 2025-06-19 RX ORDER — ARFORMOTEROL TARTRATE 15 UG/2ML
15 SOLUTION RESPIRATORY (INHALATION)
Status: DISCONTINUED | OUTPATIENT
Start: 2025-06-19 | End: 2025-06-25 | Stop reason: HOSPADM

## 2025-06-19 RX ORDER — GADOTERIDOL 279.3 MG/ML
17 INJECTION INTRAVENOUS
Status: COMPLETED | OUTPATIENT
Start: 2025-06-19 | End: 2025-06-19

## 2025-06-19 RX ORDER — GUAIFENESIN 600 MG/1
600 TABLET, EXTENDED RELEASE ORAL 2 TIMES DAILY
Status: DISCONTINUED | OUTPATIENT
Start: 2025-06-19 | End: 2025-06-20

## 2025-06-19 RX ORDER — BUMETANIDE 0.25 MG/ML
2 INJECTION, SOLUTION INTRAMUSCULAR; INTRAVENOUS DAILY
Status: DISCONTINUED | OUTPATIENT
Start: 2025-06-19 | End: 2025-06-22

## 2025-06-19 RX ADMIN — PIPERACILLIN AND TAZOBACTAM 3375 MG: 3; .375 INJECTION, POWDER, LYOPHILIZED, FOR SOLUTION INTRAVENOUS at 00:17

## 2025-06-19 RX ADMIN — ALBUTEROL SULFATE 2.5 MG: 2.5 SOLUTION RESPIRATORY (INHALATION) at 08:34

## 2025-06-19 RX ADMIN — BUDESONIDE 500 MCG: 0.5 INHALANT RESPIRATORY (INHALATION) at 19:40

## 2025-06-19 RX ADMIN — ENOXAPARIN SODIUM 40 MG: 100 INJECTION SUBCUTANEOUS at 07:45

## 2025-06-19 RX ADMIN — DEXMEDETOMIDINE HYDROCHLORIDE 1 MCG/KG/HR: 400 INJECTION INTRAVENOUS at 03:06

## 2025-06-19 RX ADMIN — DEXMEDETOMIDINE HYDROCHLORIDE 0.8 MCG/KG/HR: 400 INJECTION INTRAVENOUS at 08:20

## 2025-06-19 RX ADMIN — BUMETANIDE 2 MG: 0.25 INJECTION INTRAMUSCULAR; INTRAVENOUS at 07:45

## 2025-06-19 RX ADMIN — ARFORMOTEROL TARTRATE 15 MCG: 15 SOLUTION RESPIRATORY (INHALATION) at 19:40

## 2025-06-19 RX ADMIN — GADOTERIDOL 17 ML: 279.3 INJECTION, SOLUTION INTRAVENOUS at 17:14

## 2025-06-19 RX ADMIN — Medication 3 MG: at 21:00

## 2025-06-19 RX ADMIN — PIPERACILLIN AND TAZOBACTAM 3375 MG: 3; .375 INJECTION, POWDER, LYOPHILIZED, FOR SOLUTION INTRAVENOUS at 17:33

## 2025-06-19 RX ADMIN — BUDESONIDE 500 MCG: 0.5 INHALANT RESPIRATORY (INHALATION) at 08:40

## 2025-06-19 RX ADMIN — PIPERACILLIN AND TAZOBACTAM 3375 MG: 3; .375 INJECTION, POWDER, LYOPHILIZED, FOR SOLUTION INTRAVENOUS at 07:46

## 2025-06-19 RX ADMIN — IPRATROPIUM BROMIDE 0.5 MG: 0.5 SOLUTION RESPIRATORY (INHALATION) at 19:40

## 2025-06-19 RX ADMIN — VANCOMYCIN HYDROCHLORIDE 1000 MG: 1 INJECTION, POWDER, LYOPHILIZED, FOR SOLUTION INTRAVENOUS at 17:41

## 2025-06-19 RX ADMIN — PIPERACILLIN AND TAZOBACTAM 3375 MG: 3; .375 INJECTION, POWDER, LYOPHILIZED, FOR SOLUTION INTRAVENOUS at 23:55

## 2025-06-19 RX ADMIN — SODIUM CHLORIDE, PRESERVATIVE FREE 10 ML: 5 INJECTION INTRAVENOUS at 07:46

## 2025-06-19 RX ADMIN — GUAIFENESIN 600 MG: 600 TABLET, EXTENDED RELEASE ORAL at 21:00

## 2025-06-19 ASSESSMENT — PAIN SCALES - GENERAL
PAINLEVEL_OUTOF10: 0
PAINLEVEL_OUTOF10: 0

## 2025-06-19 NOTE — H&P
Hospitalist Admission Note    NAME:  Tasha Kyle   :  1955   MRN:  [id number]     Date/Time:  2025 12:07 PM    Patient PCP: Liliane Martin MD  ________________________________________________________________________    Given the patient's current clinical presentation, I have a high level of concern for decompensation if discharged from the emergency department.  Complex decision making was performed, which includes reviewing the patient's available past medical records, laboratory results, and x-ray films.       My assessment of this patient's clinical condition and my plan of care is as follows.    Assessment / Plan:    69-year-old female with history significant for TIA, non-small cell lung cancer, anxiety, asthma, HFpEF  who presented to the emergency room with significant hypoxia, s/p cardiac arrest    ## Acute hypoxic and hypercapnic respiratory failure: stable post extubation 2025 2/2/ pulmonary edema/heart failure/cardiac arrest  Chest x-ray : Extensive bilateral patchy densities appear decreased compatible with decreasing pulmonary edema.  -Currently on high flow  -Speech has evaluated the patient: FEES completed and full note to follow. Initiating soft and bite diet with mildly thick liquids.   Plan  - Continue high flow nasal cannula and wean as tolerated   - Continue Bumex  - Wean off precedex.   - Use bipap overnight.  - Continue Brovana Pulmicort  Follow-up on repeated CT chest    # Cardiac arrest:  - PEA arrest due to respiratory status and induction.  - DINORA mild global hypokinesis - 45-50% EF.  No DVT noted in bilateral femoral veins.  - Lactic acidosis resolved.  Troponin elevation related to CPR.  Trending down.  - No significant metabolic derangements to explain PEA arrest.  - Will consult cardiology in house    #Hx CHF  #Elevated troponin 2/2 cardiac arrest  EF  Left Ventricle: Mildly reduced left ventricular systolic function with a visually estimated

## 2025-06-19 NOTE — CARE COORDINATION
6/19/25  4:23 PM    Care Management Progress Note    Reason for Admission:   Cardiac arrest (HCC) [I46.9]  Flash pulmonary edema (HCC) [J81.0]  Hypertensive emergency [I16.1]  Acute respiratory failure with hypoxia and hypercapnia (HCC) [J96.01, J96.02]         Patient Admission Status: Inpatient  RUR:   Hospitalization in the last 30 days (Readmission):  No        Transition of care plan:  Ongoing medical management  Stable, on precedex, tolerated bipap overnight. Weaned off high flow now on 5L  Discharge plan:  CM following for dc needs  On O2- following for home O2 needs  PT/OT recommending IPR- needs choices  Discharge plan communicated with patient and/or discharge caregiver: No    Date 1st IMM letter given: 6/19/25  Outpatient follow-up.  Transport at discharge: JANN Triana MSW  [facility]      Available via [employer]

## 2025-06-20 ENCOUNTER — TELEPHONE (OUTPATIENT)
Facility: HOSPITAL | Age: 70
End: 2025-06-20

## 2025-06-20 ENCOUNTER — APPOINTMENT (OUTPATIENT)
Dept: VASCULAR SURGERY | Facility: HOSPITAL | Age: 70
DRG: 208 | End: 2025-06-20
Payer: MEDICARE

## 2025-06-20 PROBLEM — R79.89 ELEVATED TROPONIN: Status: ACTIVE | Noted: 2025-06-20

## 2025-06-20 PROBLEM — I63.30 CEREBROVASCULAR ACCIDENT (CVA) DUE TO THROMBOSIS OF CEREBRAL ARTERY (HCC): Status: ACTIVE | Noted: 2025-06-20

## 2025-06-20 LAB
ALBUMIN SERPL-MCNC: 3 G/DL (ref 3.5–5)
ALBUMIN/GLOB SERPL: 0.8 (ref 1.1–2.2)
ALP SERPL-CCNC: 115 U/L (ref 45–117)
ALT SERPL-CCNC: 43 U/L (ref 12–78)
ANION GAP SERPL CALC-SCNC: 7 MMOL/L (ref 2–12)
AST SERPL-CCNC: 33 U/L (ref 15–37)
BACTERIA SPEC CULT: NORMAL
BACTERIA SPEC CULT: NORMAL
BASOPHILS # BLD: 0.09 K/UL (ref 0–0.1)
BASOPHILS NFR BLD: 0.5 % (ref 0–1)
BILIRUB DIRECT SERPL-MCNC: 0.1 MG/DL (ref 0–0.2)
BILIRUB SERPL-MCNC: 0.6 MG/DL (ref 0.2–1)
BUN SERPL-MCNC: 21 MG/DL (ref 6–20)
BUN/CREAT SERPL: 15 (ref 12–20)
CALCIUM SERPL-MCNC: 8.6 MG/DL (ref 8.5–10.1)
CHLORIDE SERPL-SCNC: 107 MMOL/L (ref 97–108)
CO2 SERPL-SCNC: 28 MMOL/L (ref 21–32)
COMMENT:: NORMAL
CREAT SERPL-MCNC: 1.43 MG/DL (ref 0.55–1.02)
DIFFERENTIAL METHOD BLD: ABNORMAL
EOSINOPHIL # BLD: 0.22 K/UL (ref 0–0.4)
EOSINOPHIL NFR BLD: 1.3 % (ref 0–7)
ERYTHROCYTE [DISTWIDTH] IN BLOOD BY AUTOMATED COUNT: 18.6 % (ref 11.5–14.5)
GLOBULIN SER CALC-MCNC: 3.6 G/DL (ref 2–4)
GLUCOSE SERPL-MCNC: 160 MG/DL (ref 65–100)
HCT VFR BLD AUTO: 22.9 % (ref 35–47)
HCT VFR BLD AUTO: 23.6 % (ref 35–47)
HCT VFR BLD AUTO: 28.2 % (ref 35–47)
HGB BLD-MCNC: 7.1 G/DL (ref 11.5–16)
HGB BLD-MCNC: 7.2 G/DL (ref 11.5–16)
HGB BLD-MCNC: 8.7 G/DL (ref 11.5–16)
HISTORY CHECK: NORMAL
IMM GRANULOCYTES # BLD AUTO: 0.14 K/UL (ref 0–0.04)
IMM GRANULOCYTES NFR BLD AUTO: 0.8 % (ref 0–0.5)
LYMPHOCYTES # BLD: 1.46 K/UL (ref 0.8–3.5)
LYMPHOCYTES NFR BLD: 8.8 % (ref 12–49)
MAGNESIUM SERPL-MCNC: 1.8 MG/DL (ref 1.6–2.4)
MCH RBC QN AUTO: 31.4 PG (ref 26–34)
MCHC RBC AUTO-ENTMCNC: 30.5 G/DL (ref 30–36.5)
MCV RBC AUTO: 103.1 FL (ref 80–99)
MONOCYTES # BLD: 1.92 K/UL (ref 0–1)
MONOCYTES NFR BLD: 11.6 % (ref 5–13)
NEUTS SEG # BLD: 12.78 K/UL (ref 1.8–8)
NEUTS SEG NFR BLD: 77 % (ref 32–75)
NRBC # BLD: 0 K/UL (ref 0–0.01)
NRBC BLD-RTO: 0 PER 100 WBC
PLATELET # BLD AUTO: 313 K/UL (ref 150–400)
PMV BLD AUTO: 9.3 FL (ref 8.9–12.9)
POTASSIUM SERPL-SCNC: 3.7 MMOL/L (ref 3.5–5.1)
PROT SERPL-MCNC: 6.6 G/DL (ref 6.4–8.2)
RBC # BLD AUTO: 2.29 M/UL (ref 3.8–5.2)
SERVICE CMNT-IMP: NORMAL
SODIUM SERPL-SCNC: 142 MMOL/L (ref 136–145)
SPECIMEN HOLD: NORMAL
TROPONIN I SERPL HS-MCNC: 1528 NG/L (ref 0–51)
VANCOMYCIN SERPL-MCNC: 21.5 UG/ML
WBC # BLD AUTO: 16.6 K/UL (ref 3.6–11)

## 2025-06-20 PROCEDURE — 86923 COMPATIBILITY TEST ELECTRIC: CPT

## 2025-06-20 PROCEDURE — 97530 THERAPEUTIC ACTIVITIES: CPT

## 2025-06-20 PROCEDURE — 6360000002 HC RX W HCPCS: Performed by: STUDENT IN AN ORGANIZED HEALTH CARE EDUCATION/TRAINING PROGRAM

## 2025-06-20 PROCEDURE — 85018 HEMOGLOBIN: CPT

## 2025-06-20 PROCEDURE — 6370000000 HC RX 637 (ALT 250 FOR IP): Performed by: NURSE PRACTITIONER

## 2025-06-20 PROCEDURE — 94669 MECHANICAL CHEST WALL OSCILL: CPT

## 2025-06-20 PROCEDURE — 97535 SELF CARE MNGMENT TRAINING: CPT

## 2025-06-20 PROCEDURE — 2580000003 HC RX 258: Performed by: HOSPITALIST

## 2025-06-20 PROCEDURE — 30233N1 TRANSFUSION OF NONAUTOLOGOUS RED BLOOD CELLS INTO PERIPHERAL VEIN, PERCUTANEOUS APPROACH: ICD-10-PCS | Performed by: HOSPITALIST

## 2025-06-20 PROCEDURE — 99233 SBSQ HOSP IP/OBS HIGH 50: CPT | Performed by: INTERNAL MEDICINE

## 2025-06-20 PROCEDURE — 2700000000 HC OXYGEN THERAPY PER DAY

## 2025-06-20 PROCEDURE — 80202 ASSAY OF VANCOMYCIN: CPT

## 2025-06-20 PROCEDURE — 86900 BLOOD TYPING SEROLOGIC ABO: CPT

## 2025-06-20 PROCEDURE — 36430 TRANSFUSION BLD/BLD COMPNT: CPT

## 2025-06-20 PROCEDURE — 94761 N-INVAS EAR/PLS OXIMETRY MLT: CPT

## 2025-06-20 PROCEDURE — 99223 1ST HOSP IP/OBS HIGH 75: CPT | Performed by: NURSE PRACTITIONER

## 2025-06-20 PROCEDURE — 2580000003 HC RX 258: Performed by: STUDENT IN AN ORGANIZED HEALTH CARE EDUCATION/TRAINING PROGRAM

## 2025-06-20 PROCEDURE — 6360000002 HC RX W HCPCS: Performed by: HOSPITALIST

## 2025-06-20 PROCEDURE — 80076 HEPATIC FUNCTION PANEL: CPT

## 2025-06-20 PROCEDURE — 6370000000 HC RX 637 (ALT 250 FOR IP): Performed by: STUDENT IN AN ORGANIZED HEALTH CARE EDUCATION/TRAINING PROGRAM

## 2025-06-20 PROCEDURE — 86901 BLOOD TYPING SEROLOGIC RH(D): CPT

## 2025-06-20 PROCEDURE — 85014 HEMATOCRIT: CPT

## 2025-06-20 PROCEDURE — P9016 RBC LEUKOCYTES REDUCED: HCPCS

## 2025-06-20 PROCEDURE — 85025 COMPLETE CBC W/AUTO DIFF WBC: CPT

## 2025-06-20 PROCEDURE — 6370000000 HC RX 637 (ALT 250 FOR IP): Performed by: HOSPITALIST

## 2025-06-20 PROCEDURE — 80048 BASIC METABOLIC PNL TOTAL CA: CPT

## 2025-06-20 PROCEDURE — 2060000000 HC ICU INTERMEDIATE R&B

## 2025-06-20 PROCEDURE — APPSS30 APP SPLIT SHARED TIME 16-30 MINUTES: Performed by: NURSE PRACTITIONER

## 2025-06-20 PROCEDURE — 83735 ASSAY OF MAGNESIUM: CPT

## 2025-06-20 PROCEDURE — 93880 EXTRACRANIAL BILAT STUDY: CPT

## 2025-06-20 PROCEDURE — 2500000003 HC RX 250 WO HCPCS: Performed by: HOSPITALIST

## 2025-06-20 PROCEDURE — 94640 AIRWAY INHALATION TREATMENT: CPT

## 2025-06-20 PROCEDURE — 05HF33Z INSERTION OF INFUSION DEVICE INTO LEFT CEPHALIC VEIN, PERCUTANEOUS APPROACH: ICD-10-PCS | Performed by: HOSPITALIST

## 2025-06-20 PROCEDURE — 6370000000 HC RX 637 (ALT 250 FOR IP)

## 2025-06-20 PROCEDURE — 92526 ORAL FUNCTION THERAPY: CPT

## 2025-06-20 PROCEDURE — 6360000002 HC RX W HCPCS: Performed by: NURSE PRACTITIONER

## 2025-06-20 PROCEDURE — 86850 RBC ANTIBODY SCREEN: CPT

## 2025-06-20 PROCEDURE — 36415 COLL VENOUS BLD VENIPUNCTURE: CPT

## 2025-06-20 PROCEDURE — 97112 NEUROMUSCULAR REEDUCATION: CPT

## 2025-06-20 RX ORDER — IPRATROPIUM BROMIDE AND ALBUTEROL SULFATE 2.5; .5 MG/3ML; MG/3ML
1 SOLUTION RESPIRATORY (INHALATION)
Status: DISCONTINUED | OUTPATIENT
Start: 2025-06-20 | End: 2025-06-21

## 2025-06-20 RX ORDER — GUAIFENESIN 600 MG/1
1200 TABLET, EXTENDED RELEASE ORAL 2 TIMES DAILY
Status: DISCONTINUED | OUTPATIENT
Start: 2025-06-20 | End: 2025-06-25 | Stop reason: HOSPADM

## 2025-06-20 RX ORDER — METOPROLOL SUCCINATE 25 MG/1
25 TABLET, EXTENDED RELEASE ORAL DAILY
Status: DISCONTINUED | OUTPATIENT
Start: 2025-06-21 | End: 2025-06-25 | Stop reason: HOSPADM

## 2025-06-20 RX ORDER — BENZOCAINE/MENTHOL 6 MG-10 MG
LOZENGE MUCOUS MEMBRANE 4 TIMES DAILY PRN
Status: DISCONTINUED | OUTPATIENT
Start: 2025-06-20 | End: 2025-06-25 | Stop reason: HOSPADM

## 2025-06-20 RX ORDER — NIFEDIPINE 30 MG/1
30 TABLET, EXTENDED RELEASE ORAL ONCE
Status: COMPLETED | OUTPATIENT
Start: 2025-06-20 | End: 2025-06-20

## 2025-06-20 RX ORDER — SODIUM CHLORIDE 0.9 % (FLUSH) 0.9 %
5-40 SYRINGE (ML) INJECTION EVERY 12 HOURS SCHEDULED
Status: DISCONTINUED | OUTPATIENT
Start: 2025-06-20 | End: 2025-06-25 | Stop reason: HOSPADM

## 2025-06-20 RX ORDER — SODIUM CHLORIDE 9 MG/ML
INJECTION, SOLUTION INTRAVENOUS PRN
Status: DISCONTINUED | OUTPATIENT
Start: 2025-06-20 | End: 2025-06-25 | Stop reason: HOSPADM

## 2025-06-20 RX ORDER — SODIUM CHLORIDE 0.9 % (FLUSH) 0.9 %
5-40 SYRINGE (ML) INJECTION PRN
Status: DISCONTINUED | OUTPATIENT
Start: 2025-06-20 | End: 2025-06-25 | Stop reason: HOSPADM

## 2025-06-20 RX ADMIN — IPRATROPIUM BROMIDE AND ALBUTEROL SULFATE 1 DOSE: .5; 3 SOLUTION RESPIRATORY (INHALATION) at 20:46

## 2025-06-20 RX ADMIN — BUDESONIDE 500 MCG: 0.5 INHALANT RESPIRATORY (INHALATION) at 20:44

## 2025-06-20 RX ADMIN — SODIUM CHLORIDE, PRESERVATIVE FREE 10 ML: 5 INJECTION INTRAVENOUS at 20:11

## 2025-06-20 RX ADMIN — PIPERACILLIN AND TAZOBACTAM 3375 MG: 3; .375 INJECTION, POWDER, LYOPHILIZED, FOR SOLUTION INTRAVENOUS at 15:32

## 2025-06-20 RX ADMIN — Medication 3 MG: at 22:38

## 2025-06-20 RX ADMIN — ARFORMOTEROL TARTRATE 15 MCG: 15 SOLUTION RESPIRATORY (INHALATION) at 20:44

## 2025-06-20 RX ADMIN — ASPIRIN 81 MG: 81 TABLET, COATED ORAL at 07:48

## 2025-06-20 RX ADMIN — ENOXAPARIN SODIUM 40 MG: 100 INJECTION SUBCUTANEOUS at 07:48

## 2025-06-20 RX ADMIN — PIPERACILLIN AND TAZOBACTAM 3375 MG: 3; .375 INJECTION, POWDER, LYOPHILIZED, FOR SOLUTION INTRAVENOUS at 08:09

## 2025-06-20 RX ADMIN — SODIUM CHLORIDE, PRESERVATIVE FREE 10 ML: 5 INJECTION INTRAVENOUS at 07:54

## 2025-06-20 RX ADMIN — NIFEDIPINE 30 MG: 30 TABLET, FILM COATED, EXTENDED RELEASE ORAL at 20:11

## 2025-06-20 RX ADMIN — BUDESONIDE 500 MCG: 0.5 INHALANT RESPIRATORY (INHALATION) at 07:43

## 2025-06-20 RX ADMIN — HYDROCORTISONE: 1 CREAM TOPICAL at 22:38

## 2025-06-20 RX ADMIN — ARFORMOTEROL TARTRATE 15 MCG: 15 SOLUTION RESPIRATORY (INHALATION) at 07:43

## 2025-06-20 RX ADMIN — IPRATROPIUM BROMIDE AND ALBUTEROL SULFATE 1 DOSE: .5; 3 SOLUTION RESPIRATORY (INHALATION) at 14:20

## 2025-06-20 RX ADMIN — GUAIFENESIN 600 MG: 600 TABLET, EXTENDED RELEASE ORAL at 07:48

## 2025-06-20 RX ADMIN — BUMETANIDE 2 MG: 0.25 INJECTION INTRAMUSCULAR; INTRAVENOUS at 07:48

## 2025-06-20 RX ADMIN — IPRATROPIUM BROMIDE 0.5 MG: 0.5 SOLUTION RESPIRATORY (INHALATION) at 07:43

## 2025-06-20 RX ADMIN — GUAIFENESIN 1200 MG: 600 TABLET, EXTENDED RELEASE ORAL at 20:10

## 2025-06-20 NOTE — CARE COORDINATION
Care Management Progress Note    Reason for Admission:   Cardiac arrest (HCC) [I46.9]  Flash pulmonary edema (HCC) [J81.0]  Hypertensive emergency [I16.1]  Acute respiratory failure with hypoxia and hypercapnia (HCC) [J96.01, J96.02]         Patient Admission Status: Inpatient  RUR: 19%  Hospitalization in the last 30 days (Readmission):  no        Transition of care plan:  [Medical]  Per attending,tentative discharge is next Wednesday  Plan is for tentative cardiac catherization on Monday  Case Management is following pt for home oxygen needs and other discharge needs.  PT and OT are recommending inpatient rehab.  Pt has [employer] insurance.  [DC plan]  PT/OT are recommending inpatient rehab.  I met with pt to discuss rehab options.  Pt is declining rehab .\"I don't need to go.  I offered pt home health services but pt declined home health also.\"I don't need home health.  Discharge plan communicated with patient and/or discharge caregiver: yes and pt is declining all services    Date 1st [facility] letter given: 6/19/25  Outpatient follow-up. Dr Liliane Martin  Transport at discharge:  family Shahla Kern RN

## 2025-06-20 NOTE — CONSENT
Informed Consent for Blood Component Transfusion Note    I have discussed with the patient the rationale for blood component transfusion; its benefits in treating or preventing fatigue, organ damage, or death; and its risk which includes mild transfusion reactions, rare risk of blood borne infection, or more serious but rare reactions. I have discussed the alternatives to transfusion, including the risk and consequences of not receiving transfusion. The patient had an opportunity to ask questions and had agreed to proceed with transfusion of blood components.    Electronically signed by Jason Benson MD on 6/20/25 at 7:44 AM EDT

## 2025-06-20 NOTE — CONSULTS
[facility] CARDIOLOGY                    Cardiology Care Note     [x]Initial Encounter     []Follow-up    Patient Name: Tasha Kyle - :1955 - MRN:[id number]  Primary Cardiologist: None  Consulting Cardiologist: Scott Cisse MD     Reason for encounter: Hypoxic Resp Failure/PEA cardiac arrest    HPI:       Tasha Kyle is a 69 y.o. female with PMH significant for TIA, non-small cell lung cancer, asthma, chronic HFpEF who is admitted for hypoxic respiratory failure and PEA cardiac arrest while being intubated on 2025.  She underwent 3 cycles of CPR/ROSC.  Rhythm was PEA.  Her pH was 6.9.  Postarrest, patient was significantly hypotensive and was started on nitroglycerin gtt but then became hypotensive requiring vasopressors..  She also received lidocaine for wide-complex tachycardia.  Her heart rate trended down and showed sinus tachycardia.  Also received IV diuretics.  Patient was extubated on 2025.  Troponins elevated.  Cardiology consulted.    Subjective:      Tasha Kyle reports dyspnea.     Assessment and Plan     Acute hypoxic/hypercapnic respiratory failure/PEA cardiac arrest-2025 presumed to be secondary to hypoxia.  Extubated on 2025.    Elevated troponins-in the setting of PEA cardiac arrest/hypoxic respiratory failure requiring intubation    Acute on chronic HfmrEF      CHASE - Cr 1.4    Non-small cell lung cancer    Plan:  Currently on Bumex IV 2 mg daily transition to p.o. in a.m.  On aspirin.  Currently not on any blood pressure medication-required vasopressors on admission.  If blood pressure tolerates, can add GDMT for HFmrEF.  (If creatinine trends down-can add ARB and then low-dose beta-blocker).  Add statin  With troponin elevation/mildly reduced EF-will benefit from ischemia evaluation once underlying medical problems have improved/resolved; can also be done as outpatient     
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    Social History     Tobacco Use    Smoking status: Former     Current packs/day: 0.00     Types: Cigarettes     Quit date: 2004     Years since quittin.9    Smokeless tobacco: Never   Substance Use Topics    Alcohol use: No       Allergies   Allergen Reactions    Statins Rash       Current Facility-Administered Medications   Medication Dose Route Frequency    bumetanide (BUMEX) injection 2 mg  2 mg IntraVENous Daily    potassium bicarb-citric acid (EFFER-K) effervescent tablet 40 mEq  40 mEq Oral Once    melatonin tablet 3 mg  3 mg Oral Nightly    arformoterol tartrate (BROVANA) nebulizer solution 15 mcg  15 mcg Nebulization BID RT    vancomycin (VANCOCIN) 1,000 mg in sodium chloride 0.9 % 250 mL IVPB ([id number])  1,000 mg IntraVENous Q24H    [START ON 2025] Vancomycin random level -  with AM labs  1 each Other Once    budesonide (PULMICORT) nebulizer suspension 500 mcg  0.5 mg Nebulization BID RT    albuterol (PROVENTIL) (2.5 MG/3ML) 0.083% nebulizer solution 2.5 mg  2.5 mg Nebulization 4x Daily RT    piperacillin-tazobactam (ZOSYN) 3,375 mg in sodium chloride 0.9 % 50 mL IVPB (addEASE)  3,375 mg IntraVENous Q8H    sodium chloride flush 0.9 % injection 5-40 mL  5-40 mL IntraVENous 2 times per day    sodium chloride flush 0.9 % injection 5-40 mL  5-40 mL IntraVENous PRN    0.9 % sodium chloride infusion   IntraVENous PRN    potassium chloride 20 mEq/50 mL IVPB (Central Line)  20 mEq IntraVENous PRN    Or    potassium chloride 10 mEq/100 mL IVPB (Peripheral Line)  10 mEq IntraVENous PRN    magnesium sulfate 2000 mg in 50 mL 
recent and remote memory.      Cranial Nerves:   Visual Fields:  normal in all quadrants in both eyes. EOM: no nystagmus. Facial movements:  symmetric, no ptosis. Facial sensation:  intact to LT on both sides.      Language: Dysphonia, no dysarthria, no aphasia. Tongue: midline.              Sensory:   LT and Temp intact in all extremities            Cerebellar:  No resting, no postural tremors, normal finger nose finger.  No pronator drift                            Motor:           LUExt: 5/ 5               RUExt: 4+/ 5                                              LLExt: 5/ 5                RLExt: 5/ 5        Gait:   Not tested       Portions of this note were completed with Dragon, the [employer] voice recognition software.  Quite often unanticipated grammatical, syntax, homophones, and other interpretive errors are inadvertently transcribed by the computer software.  Please disregard these errors.  Efforts were made to edit the dictations but occasionally words are mis-transcribed.

## 2025-06-20 NOTE — H&P
Hospitalist Admission Note    NAME:  Tasha Kyle   :  1955   MRN:  [id number]     Date/Time:  2025 11:59 AM    Patient PCP: Liliane Martin MD  ________________________________________________________________________    Given the patient's current clinical presentation, I have a high level of concern for decompensation if discharged from the emergency department.  Complex decision making was performed, which includes reviewing the patient's available past medical records, laboratory results, and x-ray films.       My assessment of this patient's clinical condition and my plan of care is as follows.    Assessment / Plan:    69-year-old female with history significant for TIA, non-small cell lung cancer, anxiety, asthma, HFpEF  who presented to the emergency room with significant hypoxia, s/p cardiac arrest    ## Acute hypoxic and hypercapnic respiratory failure: stable post extubation 2025 2/2/ pulmonary edema/heart failure/cardiac arrest  #Pneumothorax  Chest x-ray : Extensive bilateral patchy densities appear decreased compatible with decreasing pulmonary edema.  CT chest:    Small-moderate left pneumothorax.   Prominent bilateral lower lobe opacities which could be on the basis of  atelectasis, pneumonia, or combination.   Masslike opacity of the right upper lobe again demonstrated with interval  diminished adjacent pleural thickening along minor fissure.  -Currently on 3 L nasal cannula  -Speech has evaluated the patient: FEES completed and full note to follow. Initiating soft and bite diet with mildly thick liquids.   -Pulmonology has evaluated the patient  Plan  - Continue high flow nasal cannula and wean as tolerated   - Continue Bumex  -BiPAP only if needed  - Continue Brovana Pulmicort  Daily chest x-ray for pneumothorax  Appreciate pulmonary recommendation    # Cardiac arrest:  #PEA arrest  - PEA arrest due to respiratory status and induction.  - DINORA mild global

## 2025-06-21 ENCOUNTER — APPOINTMENT (OUTPATIENT)
Facility: HOSPITAL | Age: 70
DRG: 208 | End: 2025-06-21
Payer: MEDICARE

## 2025-06-21 LAB
ABO + RH BLD: NORMAL
ANION GAP SERPL CALC-SCNC: 8 MMOL/L (ref 2–12)
BACTERIA SPEC CULT: ABNORMAL
BASOPHILS # BLD: 0.15 K/UL (ref 0–0.1)
BASOPHILS NFR BLD: 1.2 % (ref 0–1)
BLD PROD TYP BPU: NORMAL
BLOOD BANK BLOOD PRODUCT EXPIRATION DATE: NORMAL
BLOOD BANK DISPENSE STATUS: NORMAL
BLOOD BANK ISBT PRODUCT BLOOD TYPE: 6200
BLOOD BANK PRODUCT CODE: NORMAL
BLOOD BANK UNIT TYPE AND RH: NORMAL
BLOOD GROUP ANTIBODIES SERPL: NORMAL
BPU ID: NORMAL
BUN SERPL-MCNC: 15 MG/DL (ref 6–20)
BUN/CREAT SERPL: 14 (ref 12–20)
CALCIUM SERPL-MCNC: 9.1 MG/DL (ref 8.5–10.1)
CC UR VC: ABNORMAL
CHLORIDE SERPL-SCNC: 106 MMOL/L (ref 97–108)
CHOLEST SERPL-MCNC: 208 MG/DL
CO2 SERPL-SCNC: 28 MMOL/L (ref 21–32)
CREAT SERPL-MCNC: 1.1 MG/DL (ref 0.55–1.02)
CROSSMATCH RESULT: NORMAL
DIFFERENTIAL METHOD BLD: ABNORMAL
EOSINOPHIL # BLD: 0.48 K/UL (ref 0–0.4)
EOSINOPHIL NFR BLD: 3.7 % (ref 0–7)
ERYTHROCYTE [DISTWIDTH] IN BLOOD BY AUTOMATED COUNT: 18.4 % (ref 11.5–14.5)
GLUCOSE SERPL-MCNC: 120 MG/DL (ref 65–100)
HCT VFR BLD AUTO: 25.6 % (ref 35–47)
HDLC SERPL-MCNC: 47 MG/DL
HDLC SERPL: 4.4 (ref 0–5)
HGB BLD-MCNC: 8 G/DL (ref 11.5–16)
IMM GRANULOCYTES # BLD AUTO: 0.05 K/UL (ref 0–0.04)
IMM GRANULOCYTES NFR BLD AUTO: 0.4 % (ref 0–0.5)
LDLC SERPL CALC-MCNC: 139.8 MG/DL (ref 0–100)
LYMPHOCYTES # BLD: 1.75 K/UL (ref 0.8–3.5)
LYMPHOCYTES NFR BLD: 13.5 % (ref 12–49)
MCH RBC QN AUTO: 31.1 PG (ref 26–34)
MCHC RBC AUTO-ENTMCNC: 31.3 G/DL (ref 30–36.5)
MCV RBC AUTO: 99.6 FL (ref 80–99)
MONOCYTES # BLD: 1.33 K/UL (ref 0–1)
MONOCYTES NFR BLD: 10.3 % (ref 5–13)
NEUTS SEG # BLD: 9.18 K/UL (ref 1.8–8)
NEUTS SEG NFR BLD: 70.9 % (ref 32–75)
NRBC # BLD: 0 K/UL (ref 0–0.01)
NRBC BLD-RTO: 0 PER 100 WBC
PLATELET # BLD AUTO: 307 K/UL (ref 150–400)
PMV BLD AUTO: 9.8 FL (ref 8.9–12.9)
POTASSIUM SERPL-SCNC: 3.5 MMOL/L (ref 3.5–5.1)
RBC # BLD AUTO: 2.57 M/UL (ref 3.8–5.2)
SERVICE CMNT-IMP: ABNORMAL
SODIUM SERPL-SCNC: 142 MMOL/L (ref 136–145)
SPECIMEN EXP DATE BLD: NORMAL
TRIGL SERPL-MCNC: 106 MG/DL
UNIT DIVISION: 0
UNIT ISSUE DATE/TIME: NORMAL
VLDLC SERPL CALC-MCNC: 21.2 MG/DL
WBC # BLD AUTO: 12.9 K/UL (ref 3.6–11)

## 2025-06-21 PROCEDURE — 6360000002 HC RX W HCPCS: Performed by: HOSPITALIST

## 2025-06-21 PROCEDURE — 85025 COMPLETE CBC W/AUTO DIFF WBC: CPT

## 2025-06-21 PROCEDURE — 36410 VNPNXR 3YR/> PHY/QHP DX/THER: CPT

## 2025-06-21 PROCEDURE — 6370000000 HC RX 637 (ALT 250 FOR IP): Performed by: NURSE PRACTITIONER

## 2025-06-21 PROCEDURE — 2060000000 HC ICU INTERMEDIATE R&B

## 2025-06-21 PROCEDURE — 6360000002 HC RX W HCPCS

## 2025-06-21 PROCEDURE — 94640 AIRWAY INHALATION TREATMENT: CPT

## 2025-06-21 PROCEDURE — 2500000003 HC RX 250 WO HCPCS: Performed by: STUDENT IN AN ORGANIZED HEALTH CARE EDUCATION/TRAINING PROGRAM

## 2025-06-21 PROCEDURE — APPSS30 APP SPLIT SHARED TIME 16-30 MINUTES: Performed by: NURSE PRACTITIONER

## 2025-06-21 PROCEDURE — 6360000002 HC RX W HCPCS: Performed by: STUDENT IN AN ORGANIZED HEALTH CARE EDUCATION/TRAINING PROGRAM

## 2025-06-21 PROCEDURE — 6370000000 HC RX 637 (ALT 250 FOR IP)

## 2025-06-21 PROCEDURE — 94761 N-INVAS EAR/PLS OXIMETRY MLT: CPT

## 2025-06-21 PROCEDURE — 2580000003 HC RX 258: Performed by: HOSPITALIST

## 2025-06-21 PROCEDURE — 80048 BASIC METABOLIC PNL TOTAL CA: CPT

## 2025-06-21 PROCEDURE — 94668 MNPJ CHEST WALL SBSQ: CPT

## 2025-06-21 PROCEDURE — 2500000003 HC RX 250 WO HCPCS: Performed by: HOSPITALIST

## 2025-06-21 PROCEDURE — 80061 LIPID PANEL: CPT

## 2025-06-21 PROCEDURE — 6370000000 HC RX 637 (ALT 250 FOR IP): Performed by: HOSPITALIST

## 2025-06-21 PROCEDURE — 2700000000 HC OXYGEN THERAPY PER DAY

## 2025-06-21 PROCEDURE — 71045 X-RAY EXAM CHEST 1 VIEW: CPT

## 2025-06-21 PROCEDURE — 6370000000 HC RX 637 (ALT 250 FOR IP): Performed by: STUDENT IN AN ORGANIZED HEALTH CARE EDUCATION/TRAINING PROGRAM

## 2025-06-21 RX ORDER — ACETYLCYSTEINE 100 MG/ML
4 SOLUTION ORAL; RESPIRATORY (INHALATION)
Status: DISCONTINUED | OUTPATIENT
Start: 2025-06-21 | End: 2025-06-22

## 2025-06-21 RX ORDER — NITROFURANTOIN 25; 75 MG/1; MG/1
100 CAPSULE ORAL EVERY 12 HOURS SCHEDULED
Status: DISCONTINUED | OUTPATIENT
Start: 2025-06-21 | End: 2025-06-21

## 2025-06-21 RX ORDER — PREDNISONE 20 MG/1
40 TABLET ORAL DAILY
Status: COMPLETED | OUTPATIENT
Start: 2025-06-21 | End: 2025-06-25

## 2025-06-21 RX ORDER — AZITHROMYCIN 250 MG/1
500 TABLET, FILM COATED ORAL DAILY
Status: COMPLETED | OUTPATIENT
Start: 2025-06-21 | End: 2025-06-25

## 2025-06-21 RX ORDER — METRONIDAZOLE 500 MG/100ML
500 INJECTION, SOLUTION INTRAVENOUS EVERY 8 HOURS
Status: DISCONTINUED | OUTPATIENT
Start: 2025-06-21 | End: 2025-06-24

## 2025-06-21 RX ORDER — IPRATROPIUM BROMIDE AND ALBUTEROL SULFATE 2.5; .5 MG/3ML; MG/3ML
1 SOLUTION RESPIRATORY (INHALATION)
Status: DISCONTINUED | OUTPATIENT
Start: 2025-06-21 | End: 2025-06-22

## 2025-06-21 RX ADMIN — ACETYLCYSTEINE 400 MG: 100 INHALANT RESPIRATORY (INHALATION) at 20:31

## 2025-06-21 RX ADMIN — PIPERACILLIN AND TAZOBACTAM 3375 MG: 3; .375 INJECTION, POWDER, LYOPHILIZED, FOR SOLUTION INTRAVENOUS at 00:15

## 2025-06-21 RX ADMIN — IPRATROPIUM BROMIDE AND ALBUTEROL SULFATE 1 DOSE: .5; 3 SOLUTION RESPIRATORY (INHALATION) at 20:30

## 2025-06-21 RX ADMIN — SODIUM CHLORIDE, PRESERVATIVE FREE 10 ML: 5 INJECTION INTRAVENOUS at 09:54

## 2025-06-21 RX ADMIN — PREDNISONE 40 MG: 20 TABLET ORAL at 11:42

## 2025-06-21 RX ADMIN — ARFORMOTEROL TARTRATE 15 MCG: 15 SOLUTION RESPIRATORY (INHALATION) at 20:27

## 2025-06-21 RX ADMIN — METOPROLOL SUCCINATE 25 MG: 25 TABLET, EXTENDED RELEASE ORAL at 09:54

## 2025-06-21 RX ADMIN — IPRATROPIUM BROMIDE AND ALBUTEROL SULFATE 1 DOSE: .5; 3 SOLUTION RESPIRATORY (INHALATION) at 07:50

## 2025-06-21 RX ADMIN — BUMETANIDE 2 MG: 0.25 INJECTION INTRAMUSCULAR; INTRAVENOUS at 09:45

## 2025-06-21 RX ADMIN — WATER 1000 MG: 1 INJECTION INTRAMUSCULAR; INTRAVENOUS; SUBCUTANEOUS at 09:45

## 2025-06-21 RX ADMIN — GUAIFENESIN 1200 MG: 600 TABLET, EXTENDED RELEASE ORAL at 09:54

## 2025-06-21 RX ADMIN — AZITHROMYCIN DIHYDRATE 500 MG: 250 TABLET ORAL at 09:54

## 2025-06-21 RX ADMIN — ASPIRIN 81 MG: 81 TABLET, COATED ORAL at 09:54

## 2025-06-21 RX ADMIN — Medication 3 MG: at 21:28

## 2025-06-21 RX ADMIN — IPRATROPIUM BROMIDE AND ALBUTEROL SULFATE 1 DOSE: .5; 3 SOLUTION RESPIRATORY (INHALATION) at 16:13

## 2025-06-21 RX ADMIN — BUDESONIDE 500 MCG: 0.5 INHALANT RESPIRATORY (INHALATION) at 20:27

## 2025-06-21 RX ADMIN — SODIUM CHLORIDE, PRESERVATIVE FREE 10 ML: 5 INJECTION INTRAVENOUS at 09:55

## 2025-06-21 RX ADMIN — ENOXAPARIN SODIUM 40 MG: 100 INJECTION SUBCUTANEOUS at 09:54

## 2025-06-21 RX ADMIN — SODIUM CHLORIDE, PRESERVATIVE FREE 10 ML: 5 INJECTION INTRAVENOUS at 21:28

## 2025-06-21 RX ADMIN — METRONIDAZOLE 500 MG: 500 INJECTION, SOLUTION INTRAVENOUS at 17:49

## 2025-06-21 RX ADMIN — GUAIFENESIN 1200 MG: 600 TABLET, EXTENDED RELEASE ORAL at 21:28

## 2025-06-21 RX ADMIN — ARFORMOTEROL TARTRATE 15 MCG: 15 SOLUTION RESPIRATORY (INHALATION) at 07:56

## 2025-06-21 RX ADMIN — METRONIDAZOLE 500 MG: 500 INJECTION, SOLUTION INTRAVENOUS at 09:47

## 2025-06-21 RX ADMIN — BUDESONIDE 500 MCG: 0.5 INHALANT RESPIRATORY (INHALATION) at 07:56

## 2025-06-21 NOTE — PROCEDURES
PROCEDURE NOTE  Date: 6/21/2025   Name: Tasha Kyle  YOB: 1955    Procedures Midline Insertion Procedure Note    Procedure: Insertion of #4 FR/18G Midline    Indications:  Long Term IV therapy    Procedure Details    Risks of hemorrhage, and adverse drug reaction were discussed.  Vessel assessment revealed compressible well defined vessels.  Multiple sticks noted to BUE prior to PICC RN arrival. LUE Midline placed without complication for patient.  2ml of Lidocaine 1% administered via infiltration prior to Midline insertion.  Time-Out performed at bedside with ALYSON Rubio.      #4 FR/18G Midline inserted to the L Cephalic vein per hospital protocol.   Blood return:  yes    Catheter length 12 cm, with 0 cm exposed. Mid upper arm circumference is 34 cm.   Catheter was flushed with 20 cc NS. Patient did tolerate procedure well. Upon completion of procedure, all MIDLINE kit components accounted for and intact.  Post Procedure hand-off given to ALYSON Rubio.      Midline Brochure given to patient with teaching instruction. Bed returned to locked position with call bell in reach.

## 2025-06-21 NOTE — SIGNIFICANT EVENT
Urine culture +ESBL   Pt is asymptomatic with WBC trending down   With patient complex hospital course, and clinical  condition will consult ID to weight in, and will start on Macrobid PO

## 2025-06-22 ENCOUNTER — APPOINTMENT (OUTPATIENT)
Facility: HOSPITAL | Age: 70
DRG: 208 | End: 2025-06-22
Payer: MEDICARE

## 2025-06-22 LAB
ANION GAP SERPL CALC-SCNC: 7 MMOL/L (ref 2–12)
BASOPHILS # BLD: 0.02 K/UL (ref 0–0.1)
BASOPHILS NFR BLD: 0.2 % (ref 0–1)
BUN SERPL-MCNC: 23 MG/DL (ref 6–20)
BUN/CREAT SERPL: 20 (ref 12–20)
CALCIUM SERPL-MCNC: 9 MG/DL (ref 8.5–10.1)
CHLORIDE SERPL-SCNC: 104 MMOL/L (ref 97–108)
CO2 SERPL-SCNC: 29 MMOL/L (ref 21–32)
CREAT SERPL-MCNC: 1.16 MG/DL (ref 0.55–1.02)
DIFFERENTIAL METHOD BLD: ABNORMAL
ECHO BSA: 1.94 M2
EOSINOPHIL # BLD: 0.01 K/UL (ref 0–0.4)
EOSINOPHIL NFR BLD: 0.1 % (ref 0–7)
ERYTHROCYTE [DISTWIDTH] IN BLOOD BY AUTOMATED COUNT: 17.2 % (ref 11.5–14.5)
FLUID CULTURE: NORMAL
GLUCOSE SERPL-MCNC: 167 MG/DL (ref 65–100)
HCT VFR BLD AUTO: 25.3 % (ref 35–47)
HGB BLD-MCNC: 7.9 G/DL (ref 11.5–16)
IMM GRANULOCYTES # BLD AUTO: 0.03 K/UL (ref 0–0.04)
IMM GRANULOCYTES NFR BLD AUTO: 0.3 % (ref 0–0.5)
LYMPHOCYTES # BLD: 1.19 K/UL (ref 0.8–3.5)
LYMPHOCYTES NFR BLD: 12.1 % (ref 12–49)
Lab: NORMAL
MCH RBC QN AUTO: 30.9 PG (ref 26–34)
MCHC RBC AUTO-ENTMCNC: 31.2 G/DL (ref 30–36.5)
MCV RBC AUTO: 98.8 FL (ref 80–99)
MONOCYTES # BLD: 0.75 K/UL (ref 0–1)
MONOCYTES NFR BLD: 7.6 % (ref 5–13)
NEUTS SEG # BLD: 7.86 K/UL (ref 1.8–8)
NEUTS SEG NFR BLD: 79.7 % (ref 32–75)
NRBC # BLD: 0 K/UL (ref 0–0.01)
NRBC BLD-RTO: 0 PER 100 WBC
ORGANISM ID: NORMAL
PLATELET # BLD AUTO: 275 K/UL (ref 150–400)
PMV BLD AUTO: 9.4 FL (ref 8.9–12.9)
POTASSIUM SERPL-SCNC: 3.7 MMOL/L (ref 3.5–5.1)
RBC # BLD AUTO: 2.56 M/UL (ref 3.8–5.2)
S PNEUM AG SPEC QL LA: NEGATIVE
SODIUM SERPL-SCNC: 140 MMOL/L (ref 136–145)
SPECIMEN SOURCE: NORMAL
SPECIMEN: NORMAL
VAS LEFT CCA DIST EDV: 14.2 CM/S
VAS LEFT CCA DIST PSV: 85.4 CM/S
VAS LEFT CCA PROX EDV: 27.1 CM/S
VAS LEFT CCA PROX PSV: 207.5 CM/S
VAS LEFT ECA EDV: 0 CM/S
VAS LEFT ECA PSV: 224.1 CM/S
VAS LEFT ICA PROX EDV: 38.7 CM/S
VAS LEFT ICA PROX PSV: 122.8 CM/S
VAS LEFT ICA/CCA PSV: 1.4 NO UNITS
VAS LEFT SUBCLAVIAN PROX EDV: 0 CM/S
VAS LEFT SUBCLAVIAN PROX PSV: 223.2 CM/S
VAS LEFT VERTEBRAL EDV: 38.7 CM/S
VAS LEFT VERTEBRAL PSV: 122.1 CM/S
VAS RIGHT CCA DIST EDV: 19.4 CM/S
VAS RIGHT CCA DIST PSV: 148.6 CM/S
VAS RIGHT CCA PROX EDV: 45.3 CM/S
VAS RIGHT CCA PROX PSV: 171.8 CM/S
VAS RIGHT ECA EDV: 0 CM/S
VAS RIGHT ECA PSV: 162.5 CM/S
VAS RIGHT ICA PROX EDV: 16.6 CM/S
VAS RIGHT ICA PROX PSV: 65.1 CM/S
VAS RIGHT ICA/CCA PSV: 0.4 NO UNITS
VAS RIGHT SUBCLAVIAN PROX EDV: 0 CM/S
VAS RIGHT SUBCLAVIAN PROX PSV: 223.8 CM/S
VAS RIGHT VERTEBRAL EDV: 14.8 CM/S
VAS RIGHT VERTEBRAL PSV: 55.9 CM/S
WBC # BLD AUTO: 9.9 K/UL (ref 3.6–11)

## 2025-06-22 PROCEDURE — 94669 MECHANICAL CHEST WALL OSCILL: CPT

## 2025-06-22 PROCEDURE — 2500000003 HC RX 250 WO HCPCS: Performed by: STUDENT IN AN ORGANIZED HEALTH CARE EDUCATION/TRAINING PROGRAM

## 2025-06-22 PROCEDURE — 6370000000 HC RX 637 (ALT 250 FOR IP)

## 2025-06-22 PROCEDURE — 71045 X-RAY EXAM CHEST 1 VIEW: CPT

## 2025-06-22 PROCEDURE — 94668 MNPJ CHEST WALL SBSQ: CPT

## 2025-06-22 PROCEDURE — 6370000000 HC RX 637 (ALT 250 FOR IP): Performed by: NURSE PRACTITIONER

## 2025-06-22 PROCEDURE — 99233 SBSQ HOSP IP/OBS HIGH 50: CPT | Performed by: INTERNAL MEDICINE

## 2025-06-22 PROCEDURE — 6360000002 HC RX W HCPCS: Performed by: HOSPITALIST

## 2025-06-22 PROCEDURE — 6370000000 HC RX 637 (ALT 250 FOR IP): Performed by: STUDENT IN AN ORGANIZED HEALTH CARE EDUCATION/TRAINING PROGRAM

## 2025-06-22 PROCEDURE — 94640 AIRWAY INHALATION TREATMENT: CPT

## 2025-06-22 PROCEDURE — 85025 COMPLETE CBC W/AUTO DIFF WBC: CPT

## 2025-06-22 PROCEDURE — 80048 BASIC METABOLIC PNL TOTAL CA: CPT

## 2025-06-22 PROCEDURE — 6360000002 HC RX W HCPCS: Performed by: STUDENT IN AN ORGANIZED HEALTH CARE EDUCATION/TRAINING PROGRAM

## 2025-06-22 PROCEDURE — 2700000000 HC OXYGEN THERAPY PER DAY

## 2025-06-22 PROCEDURE — 6370000000 HC RX 637 (ALT 250 FOR IP): Performed by: HOSPITALIST

## 2025-06-22 PROCEDURE — 94761 N-INVAS EAR/PLS OXIMETRY MLT: CPT

## 2025-06-22 PROCEDURE — 2580000003 HC RX 258: Performed by: STUDENT IN AN ORGANIZED HEALTH CARE EDUCATION/TRAINING PROGRAM

## 2025-06-22 PROCEDURE — 2060000000 HC ICU INTERMEDIATE R&B

## 2025-06-22 RX ORDER — IPRATROPIUM BROMIDE AND ALBUTEROL SULFATE 2.5; .5 MG/3ML; MG/3ML
1 SOLUTION RESPIRATORY (INHALATION)
Status: DISCONTINUED | OUTPATIENT
Start: 2025-06-22 | End: 2025-06-25 | Stop reason: HOSPADM

## 2025-06-22 RX ORDER — SODIUM CHLORIDE FOR INHALATION 3 %
4 VIAL, NEBULIZER (ML) INHALATION
Status: DISCONTINUED | OUTPATIENT
Start: 2025-06-22 | End: 2025-06-25 | Stop reason: HOSPADM

## 2025-06-22 RX ORDER — ACETYLCYSTEINE 100 MG/ML
4 SOLUTION ORAL; RESPIRATORY (INHALATION)
Status: DISCONTINUED | OUTPATIENT
Start: 2025-06-22 | End: 2025-06-22

## 2025-06-22 RX ORDER — BUMETANIDE 1 MG/1
2 TABLET ORAL DAILY
Status: DISCONTINUED | OUTPATIENT
Start: 2025-06-23 | End: 2025-06-25 | Stop reason: HOSPADM

## 2025-06-22 RX ADMIN — GUAIFENESIN 1200 MG: 600 TABLET, EXTENDED RELEASE ORAL at 09:03

## 2025-06-22 RX ADMIN — ARFORMOTEROL TARTRATE 15 MCG: 15 SOLUTION RESPIRATORY (INHALATION) at 07:59

## 2025-06-22 RX ADMIN — METRONIDAZOLE 500 MG: 500 INJECTION, SOLUTION INTRAVENOUS at 09:12

## 2025-06-22 RX ADMIN — SACUBITRIL AND VALSARTAN 1 TABLET: 24; 26 TABLET, FILM COATED ORAL at 09:03

## 2025-06-22 RX ADMIN — IPRATROPIUM BROMIDE AND ALBUTEROL SULFATE 1 DOSE: .5; 3 SOLUTION RESPIRATORY (INHALATION) at 07:50

## 2025-06-22 RX ADMIN — SACUBITRIL AND VALSARTAN 1 TABLET: 24; 26 TABLET, FILM COATED ORAL at 21:09

## 2025-06-22 RX ADMIN — ACETYLCYSTEINE 400 MG: 100 INHALANT RESPIRATORY (INHALATION) at 01:50

## 2025-06-22 RX ADMIN — GUAIFENESIN 1200 MG: 600 TABLET, EXTENDED RELEASE ORAL at 21:09

## 2025-06-22 RX ADMIN — ACETYLCYSTEINE 400 MG: 100 INHALANT RESPIRATORY (INHALATION) at 05:27

## 2025-06-22 RX ADMIN — METOPROLOL SUCCINATE 25 MG: 25 TABLET, EXTENDED RELEASE ORAL at 09:03

## 2025-06-22 RX ADMIN — Medication 3 MG: at 21:09

## 2025-06-22 RX ADMIN — IPRATROPIUM BROMIDE AND ALBUTEROL SULFATE 1 DOSE: .5; 3 SOLUTION RESPIRATORY (INHALATION) at 01:47

## 2025-06-22 RX ADMIN — BUDESONIDE 500 MCG: 0.5 INHALANT RESPIRATORY (INHALATION) at 07:59

## 2025-06-22 RX ADMIN — SODIUM CHLORIDE, PRESERVATIVE FREE 10 ML: 5 INJECTION INTRAVENOUS at 09:12

## 2025-06-22 RX ADMIN — AZITHROMYCIN DIHYDRATE 500 MG: 250 TABLET ORAL at 09:03

## 2025-06-22 RX ADMIN — METRONIDAZOLE 500 MG: 500 INJECTION, SOLUTION INTRAVENOUS at 00:52

## 2025-06-22 RX ADMIN — ARFORMOTEROL TARTRATE 15 MCG: 15 SOLUTION RESPIRATORY (INHALATION) at 19:50

## 2025-06-22 RX ADMIN — Medication 4 ML: at 19:50

## 2025-06-22 RX ADMIN — ENOXAPARIN SODIUM 40 MG: 100 INJECTION SUBCUTANEOUS at 09:03

## 2025-06-22 RX ADMIN — BUDESONIDE 500 MCG: 0.5 INHALANT RESPIRATORY (INHALATION) at 19:50

## 2025-06-22 RX ADMIN — WATER 1000 MG: 1 INJECTION INTRAMUSCULAR; INTRAVENOUS; SUBCUTANEOUS at 09:03

## 2025-06-22 RX ADMIN — PREDNISONE 40 MG: 20 TABLET ORAL at 09:03

## 2025-06-22 RX ADMIN — IPRATROPIUM BROMIDE AND ALBUTEROL SULFATE 1 DOSE: .5; 3 SOLUTION RESPIRATORY (INHALATION) at 19:55

## 2025-06-22 RX ADMIN — BUMETANIDE 2 MG: 0.25 INJECTION INTRAMUSCULAR; INTRAVENOUS at 09:03

## 2025-06-22 RX ADMIN — ASPIRIN 81 MG: 81 TABLET, COATED ORAL at 09:03

## 2025-06-22 RX ADMIN — IPRATROPIUM BROMIDE AND ALBUTEROL SULFATE 1 DOSE: .5; 3 SOLUTION RESPIRATORY (INHALATION) at 12:25

## 2025-06-22 RX ADMIN — METRONIDAZOLE 500 MG: 500 INJECTION, SOLUTION INTRAVENOUS at 17:59

## 2025-06-22 ASSESSMENT — PAIN SCALES - GENERAL
PAINLEVEL_OUTOF10: 0
PAINLEVEL_OUTOF10: 0

## 2025-06-23 ENCOUNTER — APPOINTMENT (OUTPATIENT)
Facility: HOSPITAL | Age: 70
DRG: 208 | End: 2025-06-23
Payer: MEDICARE

## 2025-06-23 ENCOUNTER — HOSPITAL ENCOUNTER (INPATIENT)
Facility: HOSPITAL | Age: 70
Discharge: HOME | DRG: 208 | End: 2025-06-25
Payer: MEDICARE

## 2025-06-23 VITALS
WEIGHT: 209.6 LBS | RESPIRATION RATE: 16 BRPM | HEIGHT: 62 IN | BODY MASS INDEX: 38.57 KG/M2 | DIASTOLIC BLOOD PRESSURE: 68 MMHG | HEART RATE: 69 BPM | SYSTOLIC BLOOD PRESSURE: 156 MMHG

## 2025-06-23 LAB
ANION GAP SERPL CALC-SCNC: 10 MMOL/L (ref 2–12)
BASOPHILS # BLD: 0.04 K/UL (ref 0–0.1)
BASOPHILS NFR BLD: 0.3 % (ref 0–1)
BUN SERPL-MCNC: 28 MG/DL (ref 6–20)
BUN/CREAT SERPL: 26 (ref 12–20)
CALCIUM SERPL-MCNC: 8.9 MG/DL (ref 8.5–10.1)
CHLORIDE SERPL-SCNC: 105 MMOL/L (ref 97–108)
CO2 SERPL-SCNC: 27 MMOL/L (ref 21–32)
CREAT SERPL-MCNC: 1.06 MG/DL (ref 0.55–1.02)
DIFFERENTIAL METHOD BLD: ABNORMAL
ECHO BSA: 2.04 M2
EOSINOPHIL # BLD: 0.02 K/UL (ref 0–0.4)
EOSINOPHIL NFR BLD: 0.2 % (ref 0–7)
ERYTHROCYTE [DISTWIDTH] IN BLOOD BY AUTOMATED COUNT: 17 % (ref 11.5–14.5)
GLUCOSE SERPL-MCNC: 150 MG/DL (ref 65–100)
HCT VFR BLD AUTO: 28.1 % (ref 35–47)
HGB BLD-MCNC: 8.7 G/DL (ref 11.5–16)
IMM GRANULOCYTES # BLD AUTO: 0.06 K/UL (ref 0–0.04)
IMM GRANULOCYTES NFR BLD AUTO: 0.5 % (ref 0–0.5)
L PNEUMO1 AG UR QL IA: NEGATIVE
LYMPHOCYTES # BLD: 1.63 K/UL (ref 0.8–3.5)
LYMPHOCYTES NFR BLD: 14.2 % (ref 12–49)
MCH RBC QN AUTO: 31.1 PG (ref 26–34)
MCHC RBC AUTO-ENTMCNC: 31 G/DL (ref 30–36.5)
MCV RBC AUTO: 100.4 FL (ref 80–99)
MONOCYTES # BLD: 0.94 K/UL (ref 0–1)
MONOCYTES NFR BLD: 8.2 % (ref 5–13)
NEUTS SEG # BLD: 8.78 K/UL (ref 1.8–8)
NEUTS SEG NFR BLD: 76.6 % (ref 32–75)
NRBC # BLD: 0 K/UL (ref 0–0.01)
NRBC BLD-RTO: 0 PER 100 WBC
NT PRO BNP: 2239 PG/ML
NUC STRESS EJECTION FRACTION: 49 %
PLATELET # BLD AUTO: 322 K/UL (ref 150–400)
PMV BLD AUTO: 10.4 FL (ref 8.9–12.9)
POTASSIUM SERPL-SCNC: 4.2 MMOL/L (ref 3.5–5.1)
RBC # BLD AUTO: 2.8 M/UL (ref 3.8–5.2)
SODIUM SERPL-SCNC: 142 MMOL/L (ref 136–145)
SPECIMEN SOURCE: NORMAL
STRESS BASELINE DIAS BP: 77 MMHG
STRESS BASELINE HR: 73 BPM
STRESS BASELINE SYS BP: 165 MMHG
STRESS ESTIMATED WORKLOAD: 1 METS
STRESS PEAK DIAS BP: 62 MMHG
STRESS PEAK SYS BP: 180 MMHG
STRESS PERCENT HR ACHIEVED: 59 %
STRESS POST PEAK HR: 88 BPM
STRESS RATE PRESSURE PRODUCT: NORMAL BPM*MMHG
STRESS TARGET HR: 150 BPM
WBC # BLD AUTO: 11.5 K/UL (ref 3.6–11)

## 2025-06-23 PROCEDURE — 93018 CV STRESS TEST I&R ONLY: CPT | Performed by: INTERNAL MEDICINE

## 2025-06-23 PROCEDURE — 97535 SELF CARE MNGMENT TRAINING: CPT

## 2025-06-23 PROCEDURE — 94640 AIRWAY INHALATION TREATMENT: CPT

## 2025-06-23 PROCEDURE — 3430000000 HC RX DIAGNOSTIC RADIOPHARMACEUTICAL: Performed by: NURSE PRACTITIONER

## 2025-06-23 PROCEDURE — 2060000000 HC ICU INTERMEDIATE R&B

## 2025-06-23 PROCEDURE — APPSS30 APP SPLIT SHARED TIME 16-30 MINUTES: Performed by: NURSE PRACTITIONER

## 2025-06-23 PROCEDURE — 94761 N-INVAS EAR/PLS OXIMETRY MLT: CPT

## 2025-06-23 PROCEDURE — 71045 X-RAY EXAM CHEST 1 VIEW: CPT

## 2025-06-23 PROCEDURE — 85025 COMPLETE CBC W/AUTO DIFF WBC: CPT

## 2025-06-23 PROCEDURE — 6370000000 HC RX 637 (ALT 250 FOR IP): Performed by: INTERNAL MEDICINE

## 2025-06-23 PROCEDURE — 99232 SBSQ HOSP IP/OBS MODERATE 35: CPT | Performed by: INTERNAL MEDICINE

## 2025-06-23 PROCEDURE — 97530 THERAPEUTIC ACTIVITIES: CPT

## 2025-06-23 PROCEDURE — 78452 HT MUSCLE IMAGE SPECT MULT: CPT

## 2025-06-23 PROCEDURE — 78452 HT MUSCLE IMAGE SPECT MULT: CPT | Performed by: INTERNAL MEDICINE

## 2025-06-23 PROCEDURE — 6360000002 HC RX W HCPCS: Performed by: HOSPITALIST

## 2025-06-23 PROCEDURE — A9500 TC99M SESTAMIBI: HCPCS | Performed by: NURSE PRACTITIONER

## 2025-06-23 PROCEDURE — 93017 CV STRESS TEST TRACING ONLY: CPT

## 2025-06-23 PROCEDURE — 94669 MECHANICAL CHEST WALL OSCILL: CPT

## 2025-06-23 PROCEDURE — 6360000002 HC RX W HCPCS: Performed by: STUDENT IN AN ORGANIZED HEALTH CARE EDUCATION/TRAINING PROGRAM

## 2025-06-23 PROCEDURE — 97116 GAIT TRAINING THERAPY: CPT

## 2025-06-23 PROCEDURE — 6370000000 HC RX 637 (ALT 250 FOR IP)

## 2025-06-23 PROCEDURE — 2700000000 HC OXYGEN THERAPY PER DAY

## 2025-06-23 PROCEDURE — 6370000000 HC RX 637 (ALT 250 FOR IP): Performed by: NURSE PRACTITIONER

## 2025-06-23 PROCEDURE — 93016 CV STRESS TEST SUPVJ ONLY: CPT | Performed by: INTERNAL MEDICINE

## 2025-06-23 PROCEDURE — 6360000002 HC RX W HCPCS: Performed by: INTERNAL MEDICINE

## 2025-06-23 PROCEDURE — 6370000000 HC RX 637 (ALT 250 FOR IP): Performed by: STUDENT IN AN ORGANIZED HEALTH CARE EDUCATION/TRAINING PROGRAM

## 2025-06-23 PROCEDURE — 2580000003 HC RX 258: Performed by: STUDENT IN AN ORGANIZED HEALTH CARE EDUCATION/TRAINING PROGRAM

## 2025-06-23 PROCEDURE — 2500000003 HC RX 250 WO HCPCS: Performed by: STUDENT IN AN ORGANIZED HEALTH CARE EDUCATION/TRAINING PROGRAM

## 2025-06-23 PROCEDURE — 80048 BASIC METABOLIC PNL TOTAL CA: CPT

## 2025-06-23 PROCEDURE — 83880 ASSAY OF NATRIURETIC PEPTIDE: CPT

## 2025-06-23 PROCEDURE — 6370000000 HC RX 637 (ALT 250 FOR IP): Performed by: HOSPITALIST

## 2025-06-23 RX ORDER — TETRAKIS(2-METHOXYISOBUTYLISOCYANIDE)COPPER(I) TETRAFLUOROBORATE 1 MG/ML
30 INJECTION, POWDER, LYOPHILIZED, FOR SOLUTION INTRAVENOUS
Status: COMPLETED | OUTPATIENT
Start: 2025-06-23 | End: 2025-06-23

## 2025-06-23 RX ORDER — SPIRONOLACTONE 25 MG/1
25 TABLET ORAL DAILY
Status: DISCONTINUED | OUTPATIENT
Start: 2025-06-24 | End: 2025-06-25 | Stop reason: HOSPADM

## 2025-06-23 RX ORDER — REGADENOSON 0.08 MG/ML
0.4 INJECTION, SOLUTION INTRAVENOUS ONCE
Status: COMPLETED | OUTPATIENT
Start: 2025-06-23 | End: 2025-06-23

## 2025-06-23 RX ORDER — TETRAKIS(2-METHOXYISOBUTYLISOCYANIDE)COPPER(I) TETRAFLUOROBORATE 1 MG/ML
11 INJECTION, POWDER, LYOPHILIZED, FOR SOLUTION INTRAVENOUS
Status: COMPLETED | OUTPATIENT
Start: 2025-06-23 | End: 2025-06-23

## 2025-06-23 RX ADMIN — Medication 4 ML: at 20:40

## 2025-06-23 RX ADMIN — ASPIRIN 81 MG: 81 TABLET, COATED ORAL at 09:14

## 2025-06-23 RX ADMIN — REGADENOSON 0.4 MG: 0.08 INJECTION, SOLUTION INTRAVENOUS at 14:33

## 2025-06-23 RX ADMIN — TETRAKIS(2-METHOXYISOBUTYLISOCYANIDE)COPPER(I) TETRAFLUOROBORATE 11 MILLICURIE: 1 INJECTION, POWDER, LYOPHILIZED, FOR SOLUTION INTRAVENOUS at 13:34

## 2025-06-23 RX ADMIN — ENOXAPARIN SODIUM 40 MG: 100 INJECTION SUBCUTANEOUS at 09:18

## 2025-06-23 RX ADMIN — WATER 1000 MG: 1 INJECTION INTRAMUSCULAR; INTRAVENOUS; SUBCUTANEOUS at 09:17

## 2025-06-23 RX ADMIN — SODIUM CHLORIDE, PRESERVATIVE FREE 10 ML: 5 INJECTION INTRAVENOUS at 09:00

## 2025-06-23 RX ADMIN — AZITHROMYCIN DIHYDRATE 500 MG: 250 TABLET ORAL at 09:15

## 2025-06-23 RX ADMIN — ARFORMOTEROL TARTRATE 15 MCG: 15 SOLUTION RESPIRATORY (INHALATION) at 07:48

## 2025-06-23 RX ADMIN — GUAIFENESIN 1200 MG: 600 TABLET, EXTENDED RELEASE ORAL at 09:14

## 2025-06-23 RX ADMIN — IPRATROPIUM BROMIDE AND ALBUTEROL SULFATE 1 DOSE: .5; 3 SOLUTION RESPIRATORY (INHALATION) at 07:54

## 2025-06-23 RX ADMIN — SODIUM CHLORIDE, PRESERVATIVE FREE 10 ML: 5 INJECTION INTRAVENOUS at 21:43

## 2025-06-23 RX ADMIN — METRONIDAZOLE 500 MG: 500 INJECTION, SOLUTION INTRAVENOUS at 01:10

## 2025-06-23 RX ADMIN — METOPROLOL SUCCINATE 25 MG: 25 TABLET, EXTENDED RELEASE ORAL at 09:15

## 2025-06-23 RX ADMIN — PREDNISONE 40 MG: 20 TABLET ORAL at 09:15

## 2025-06-23 RX ADMIN — BUMETANIDE 2 MG: 1 TABLET ORAL at 09:14

## 2025-06-23 RX ADMIN — METRONIDAZOLE 500 MG: 500 INJECTION, SOLUTION INTRAVENOUS at 09:23

## 2025-06-23 RX ADMIN — TETRAKIS(2-METHOXYISOBUTYLISOCYANIDE)COPPER(I) TETRAFLUOROBORATE 30 MILLICURIE: 1 INJECTION, POWDER, LYOPHILIZED, FOR SOLUTION INTRAVENOUS at 14:34

## 2025-06-23 RX ADMIN — METRONIDAZOLE 500 MG: 500 INJECTION, SOLUTION INTRAVENOUS at 18:28

## 2025-06-23 RX ADMIN — ARFORMOTEROL TARTRATE 15 MCG: 15 SOLUTION RESPIRATORY (INHALATION) at 20:40

## 2025-06-23 RX ADMIN — IPRATROPIUM BROMIDE AND ALBUTEROL SULFATE 1 DOSE: .5; 3 SOLUTION RESPIRATORY (INHALATION) at 20:45

## 2025-06-23 RX ADMIN — BUDESONIDE 500 MCG: 0.5 INHALANT RESPIRATORY (INHALATION) at 07:47

## 2025-06-23 RX ADMIN — SACUBITRIL AND VALSARTAN 1 TABLET: 24; 26 TABLET, FILM COATED ORAL at 09:14

## 2025-06-23 RX ADMIN — BUDESONIDE 500 MCG: 0.5 INHALANT RESPIRATORY (INHALATION) at 20:40

## 2025-06-23 RX ADMIN — GUAIFENESIN 1200 MG: 600 TABLET, EXTENDED RELEASE ORAL at 21:43

## 2025-06-23 RX ADMIN — Medication 4 ML: at 07:59

## 2025-06-23 RX ADMIN — Medication 3 MG: at 21:43

## 2025-06-23 RX ADMIN — SACUBITRIL AND VALSARTAN 1 TABLET: 24; 26 TABLET, FILM COATED ORAL at 21:43

## 2025-06-23 NOTE — CARDIO/PULMONARY
[facility] Cardiac Rehab  Pt on hospital troponin elevation list.    Chart review performed.    Troponin elevation in the setting of PEA arrest and resp failure.  If plan of care includes ischemic evaluation, will follow.  DHRUV Costa RN

## 2025-06-23 NOTE — CARE COORDINATION
4:29 PM  06/23/25    Care Management Progress Note    Reason for Admission:   Cardiac arrest (HCC) [I46.9]  Flash pulmonary edema (HCC) [J81.0]  Hypertensive emergency [I16.1]  Acute respiratory failure with hypoxia and hypercapnia (HCC) [J96.01, J96.02]         Patient Admission Status: Inpatient  Date Admitted to INP:  6/17  RUR: Readmission Risk Score: 16.8    Hospitalization in the last 30 days (Readmission):  No        Transition of care plan:  Ongoing medical management- pt discussed during IDR; pulmonology, cardiology and therapy following.  Anticipated discharge plan: Therapy recs are for IPR- pt has declined all rehab after discharge. CM spoke with pt to discuss IPR and she continues to decline, stated that she wants to go home and \"I can do it at home\". She said that her niece can assist. CM discusssed SNF rehab or home health and pt declines all assistance.  Date IM given: 6/19  Outpatient follow-up.  Discharge transport: Family      CM will continue to follow and assist with discharge needs.    KRISTY Melgar     Type of service: Individual    Content of session: In today's session, a goal for the pt was to build coping skills to navigate stress. The precipitants or triggers of certain stressful and anxious feelings and behaviors were explore today with the pt. Therapeutic efforts included helping the pt to identify connections between current feelings and her childhood experiences particularly in relation to her mother. Pt said she is learning to be more gentle on herself and not ruminate as much when she makes mistakes. Talked about how this is impacted by anxiety. Discussed anxiety coping skills. Used open ended questions, rephrasing and reflection throughout the session. Pt was given emotional support and unconditional positive regard.     Therapeutic techniques and approaches: behavior modification and supportive counseling. Rationale: appropriate for presenting issues.     Pt denies SI/HI. Mood was euthymic, affect matches verbal content. AAOx3, participated fully in session.     Time spent in counselinmins      Leola Dobson, Social Work Intern 2023 2:47 PM

## 2025-06-24 ENCOUNTER — APPOINTMENT (OUTPATIENT)
Facility: HOSPITAL | Age: 70
DRG: 208 | End: 2025-06-24
Payer: MEDICARE

## 2025-06-24 LAB
ANION GAP SERPL CALC-SCNC: 5 MMOL/L (ref 2–12)
BASOPHILS # BLD: 0.04 K/UL (ref 0–0.1)
BASOPHILS NFR BLD: 0.3 % (ref 0–1)
BUN SERPL-MCNC: 28 MG/DL (ref 6–20)
BUN/CREAT SERPL: 26 (ref 12–20)
CALCIUM SERPL-MCNC: 9 MG/DL (ref 8.5–10.1)
CHLORIDE SERPL-SCNC: 107 MMOL/L (ref 97–108)
CO2 SERPL-SCNC: 30 MMOL/L (ref 21–32)
CREAT SERPL-MCNC: 1.08 MG/DL (ref 0.55–1.02)
DIFFERENTIAL METHOD BLD: ABNORMAL
EOSINOPHIL # BLD: 0.04 K/UL (ref 0–0.4)
EOSINOPHIL NFR BLD: 0.3 % (ref 0–7)
ERYTHROCYTE [DISTWIDTH] IN BLOOD BY AUTOMATED COUNT: 16.9 % (ref 11.5–14.5)
GLUCOSE SERPL-MCNC: 97 MG/DL (ref 65–100)
HCT VFR BLD AUTO: 29 % (ref 35–47)
HGB BLD-MCNC: 9 G/DL (ref 11.5–16)
IMM GRANULOCYTES # BLD AUTO: 0.06 K/UL (ref 0–0.04)
IMM GRANULOCYTES NFR BLD AUTO: 0.5 % (ref 0–0.5)
LYMPHOCYTES # BLD: 1.81 K/UL (ref 0.8–3.5)
LYMPHOCYTES NFR BLD: 15.3 % (ref 12–49)
M PNEUMO IGG SER IA-ACNC: 148 U/ML (ref 0–99)
M PNEUMO IGM SER IA-ACNC: <770 U/ML (ref 0–769)
MCH RBC QN AUTO: 30.5 PG (ref 26–34)
MCHC RBC AUTO-ENTMCNC: 31 G/DL (ref 30–36.5)
MCV RBC AUTO: 98.3 FL (ref 80–99)
MONOCYTES # BLD: 0.87 K/UL (ref 0–1)
MONOCYTES NFR BLD: 7.4 % (ref 5–13)
NEUTS SEG # BLD: 8.98 K/UL (ref 1.8–8)
NEUTS SEG NFR BLD: 76.2 % (ref 32–75)
NRBC # BLD: 0 K/UL (ref 0–0.01)
NRBC BLD-RTO: 0 PER 100 WBC
PLATELET # BLD AUTO: 340 K/UL (ref 150–400)
PMV BLD AUTO: 10.2 FL (ref 8.9–12.9)
POTASSIUM SERPL-SCNC: 3.5 MMOL/L (ref 3.5–5.1)
RBC # BLD AUTO: 2.95 M/UL (ref 3.8–5.2)
SODIUM SERPL-SCNC: 142 MMOL/L (ref 136–145)
WBC # BLD AUTO: 11.8 K/UL (ref 3.6–11)

## 2025-06-24 PROCEDURE — 85025 COMPLETE CBC W/AUTO DIFF WBC: CPT

## 2025-06-24 PROCEDURE — 6360000002 HC RX W HCPCS: Performed by: HOSPITALIST

## 2025-06-24 PROCEDURE — 94669 MECHANICAL CHEST WALL OSCILL: CPT

## 2025-06-24 PROCEDURE — 97112 NEUROMUSCULAR REEDUCATION: CPT

## 2025-06-24 PROCEDURE — 94761 N-INVAS EAR/PLS OXIMETRY MLT: CPT

## 2025-06-24 PROCEDURE — 2500000003 HC RX 250 WO HCPCS: Performed by: STUDENT IN AN ORGANIZED HEALTH CARE EDUCATION/TRAINING PROGRAM

## 2025-06-24 PROCEDURE — 2580000003 HC RX 258: Performed by: STUDENT IN AN ORGANIZED HEALTH CARE EDUCATION/TRAINING PROGRAM

## 2025-06-24 PROCEDURE — 6370000000 HC RX 637 (ALT 250 FOR IP): Performed by: NURSE PRACTITIONER

## 2025-06-24 PROCEDURE — 6360000002 HC RX W HCPCS: Performed by: STUDENT IN AN ORGANIZED HEALTH CARE EDUCATION/TRAINING PROGRAM

## 2025-06-24 PROCEDURE — 97116 GAIT TRAINING THERAPY: CPT

## 2025-06-24 PROCEDURE — 6370000000 HC RX 637 (ALT 250 FOR IP): Performed by: HOSPITALIST

## 2025-06-24 PROCEDURE — 80048 BASIC METABOLIC PNL TOTAL CA: CPT

## 2025-06-24 PROCEDURE — 94640 AIRWAY INHALATION TREATMENT: CPT

## 2025-06-24 PROCEDURE — 1100000000 HC RM PRIVATE

## 2025-06-24 PROCEDURE — 6370000000 HC RX 637 (ALT 250 FOR IP): Performed by: STUDENT IN AN ORGANIZED HEALTH CARE EDUCATION/TRAINING PROGRAM

## 2025-06-24 PROCEDURE — 36415 COLL VENOUS BLD VENIPUNCTURE: CPT

## 2025-06-24 PROCEDURE — 97535 SELF CARE MNGMENT TRAINING: CPT

## 2025-06-24 PROCEDURE — 6370000000 HC RX 637 (ALT 250 FOR IP): Performed by: INTERNAL MEDICINE

## 2025-06-24 PROCEDURE — 71045 X-RAY EXAM CHEST 1 VIEW: CPT

## 2025-06-24 PROCEDURE — 92612 ENDOSCOPY SWALLOW (FEES) VID: CPT

## 2025-06-24 PROCEDURE — 6370000000 HC RX 637 (ALT 250 FOR IP)

## 2025-06-24 RX ORDER — CLOPIDOGREL BISULFATE 75 MG/1
75 TABLET ORAL DAILY
Status: DISCONTINUED | OUTPATIENT
Start: 2025-06-24 | End: 2025-06-25 | Stop reason: HOSPADM

## 2025-06-24 RX ADMIN — GUAIFENESIN 1200 MG: 600 TABLET, EXTENDED RELEASE ORAL at 20:58

## 2025-06-24 RX ADMIN — ASPIRIN 81 MG: 81 TABLET, COATED ORAL at 09:39

## 2025-06-24 RX ADMIN — AZITHROMYCIN DIHYDRATE 500 MG: 250 TABLET ORAL at 09:39

## 2025-06-24 RX ADMIN — PREDNISONE 40 MG: 20 TABLET ORAL at 09:39

## 2025-06-24 RX ADMIN — IPRATROPIUM BROMIDE AND ALBUTEROL SULFATE 1 DOSE: .5; 3 SOLUTION RESPIRATORY (INHALATION) at 07:51

## 2025-06-24 RX ADMIN — ARFORMOTEROL TARTRATE 15 MCG: 15 SOLUTION RESPIRATORY (INHALATION) at 07:57

## 2025-06-24 RX ADMIN — BUMETANIDE 2 MG: 1 TABLET ORAL at 09:40

## 2025-06-24 RX ADMIN — ARFORMOTEROL TARTRATE 15 MCG: 15 SOLUTION RESPIRATORY (INHALATION) at 20:23

## 2025-06-24 RX ADMIN — SODIUM CHLORIDE, PRESERVATIVE FREE 10 ML: 5 INJECTION INTRAVENOUS at 21:01

## 2025-06-24 RX ADMIN — SODIUM CHLORIDE, PRESERVATIVE FREE 10 ML: 5 INJECTION INTRAVENOUS at 09:41

## 2025-06-24 RX ADMIN — GUAIFENESIN 1200 MG: 600 TABLET, EXTENDED RELEASE ORAL at 09:40

## 2025-06-24 RX ADMIN — METRONIDAZOLE 500 MG: 500 INJECTION, SOLUTION INTRAVENOUS at 00:53

## 2025-06-24 RX ADMIN — Medication 3 MG: at 20:58

## 2025-06-24 RX ADMIN — SPIRONOLACTONE 25 MG: 25 TABLET ORAL at 09:39

## 2025-06-24 RX ADMIN — SACUBITRIL AND VALSARTAN 1 TABLET: 24; 26 TABLET, FILM COATED ORAL at 09:40

## 2025-06-24 RX ADMIN — HYDROCORTISONE: 1 CREAM TOPICAL at 22:24

## 2025-06-24 RX ADMIN — WATER 1000 MG: 1 INJECTION INTRAMUSCULAR; INTRAVENOUS; SUBCUTANEOUS at 09:40

## 2025-06-24 RX ADMIN — Medication 4 ML: at 20:24

## 2025-06-24 RX ADMIN — IPRATROPIUM BROMIDE AND ALBUTEROL SULFATE 1 DOSE: .5; 3 SOLUTION RESPIRATORY (INHALATION) at 13:33

## 2025-06-24 RX ADMIN — Medication 4 ML: at 07:51

## 2025-06-24 RX ADMIN — BUDESONIDE 500 MCG: 0.5 INHALANT RESPIRATORY (INHALATION) at 07:57

## 2025-06-24 RX ADMIN — IPRATROPIUM BROMIDE AND ALBUTEROL SULFATE 1 DOSE: .5; 3 SOLUTION RESPIRATORY (INHALATION) at 20:14

## 2025-06-24 RX ADMIN — CLOPIDOGREL BISULFATE 75 MG: 75 TABLET, FILM COATED ORAL at 16:09

## 2025-06-24 RX ADMIN — SACUBITRIL AND VALSARTAN 1 TABLET: 24; 26 TABLET, FILM COATED ORAL at 20:58

## 2025-06-24 RX ADMIN — BUDESONIDE 500 MCG: 0.5 INHALANT RESPIRATORY (INHALATION) at 20:23

## 2025-06-24 RX ADMIN — METOPROLOL SUCCINATE 25 MG: 25 TABLET, EXTENDED RELEASE ORAL at 09:40

## 2025-06-24 RX ADMIN — ENOXAPARIN SODIUM 40 MG: 100 INJECTION SUBCUTANEOUS at 09:40

## 2025-06-24 NOTE — CARE COORDINATION
2:36 PM  25    Care Management Progress Note    Reason for Admission:   Cardiac arrest (HCC) [I46.9]  Flash pulmonary edema (HCC) [J81.0]  Hypertensive emergency [I16.1]  Acute respiratory failure with hypoxia and hypercapnia (HCC) [J96.01, J96.02]         Patient Admission Status: Inpatient  Date Admitted to INP:    RUR: Readmission Risk Score: 15.9    Hospitalization in the last 30 days (Readmission):  No        Transition of care plan:  Pt discussed during IDR- ongoing medical management.  Anticipated discharge plan: CM met face to face with pt- she does not want to do any short term rehab at [facility] or SNF, she said that she needs to attend a  over the weekend. Pt was agreeable to home health; no agency preference. CM sent referrals via [employer] to see which agency can accept pt's [employer] Medicare plan; awaiting responses.  Date IM given:   Outpatient follow-up.  Discharge transport: Family will transport at [place].      CM will continue to follow and assist with discharge needs.    KRISTY Melgar

## 2025-06-25 VITALS
SYSTOLIC BLOOD PRESSURE: 134 MMHG | HEART RATE: 79 BPM | RESPIRATION RATE: 18 BRPM | DIASTOLIC BLOOD PRESSURE: 65 MMHG | OXYGEN SATURATION: 99 % | BODY MASS INDEX: 38 KG/M2 | HEIGHT: 62 IN | TEMPERATURE: 97.5 F | WEIGHT: 206.5 LBS

## 2025-06-25 PROBLEM — R79.89 ELEVATED TROPONIN: Status: RESOLVED | Noted: 2025-06-20 | Resolved: 2025-06-25

## 2025-06-25 PROBLEM — I46.9 CARDIAC ARREST (HCC): Status: RESOLVED | Noted: 2025-06-17 | Resolved: 2025-06-25

## 2025-06-25 LAB
BACTERIA SPEC CULT: NORMAL
BACTERIA SPEC CULT: NORMAL
SERVICE CMNT-IMP: NORMAL
SERVICE CMNT-IMP: NORMAL

## 2025-06-25 PROCEDURE — 2580000003 HC RX 258: Performed by: STUDENT IN AN ORGANIZED HEALTH CARE EDUCATION/TRAINING PROGRAM

## 2025-06-25 PROCEDURE — 6370000000 HC RX 637 (ALT 250 FOR IP): Performed by: HOSPITALIST

## 2025-06-25 PROCEDURE — 6360000002 HC RX W HCPCS: Performed by: HOSPITALIST

## 2025-06-25 PROCEDURE — 6370000000 HC RX 637 (ALT 250 FOR IP): Performed by: INTERNAL MEDICINE

## 2025-06-25 PROCEDURE — 97530 THERAPEUTIC ACTIVITIES: CPT

## 2025-06-25 PROCEDURE — 94640 AIRWAY INHALATION TREATMENT: CPT

## 2025-06-25 PROCEDURE — 6360000002 HC RX W HCPCS: Performed by: STUDENT IN AN ORGANIZED HEALTH CARE EDUCATION/TRAINING PROGRAM

## 2025-06-25 PROCEDURE — 2500000003 HC RX 250 WO HCPCS: Performed by: STUDENT IN AN ORGANIZED HEALTH CARE EDUCATION/TRAINING PROGRAM

## 2025-06-25 PROCEDURE — 6370000000 HC RX 637 (ALT 250 FOR IP): Performed by: NURSE PRACTITIONER

## 2025-06-25 PROCEDURE — 6370000000 HC RX 637 (ALT 250 FOR IP): Performed by: STUDENT IN AN ORGANIZED HEALTH CARE EDUCATION/TRAINING PROGRAM

## 2025-06-25 PROCEDURE — 97116 GAIT TRAINING THERAPY: CPT

## 2025-06-25 PROCEDURE — 6370000000 HC RX 637 (ALT 250 FOR IP)

## 2025-06-25 PROCEDURE — 94761 N-INVAS EAR/PLS OXIMETRY MLT: CPT

## 2025-06-25 RX ORDER — BUMETANIDE 2 MG/1
2 TABLET ORAL DAILY
Qty: 30 TABLET | Refills: 3 | Status: SHIPPED
Start: 2025-06-26 | End: 2025-06-25

## 2025-06-25 RX ORDER — SPIRONOLACTONE 25 MG/1
25 TABLET ORAL DAILY
Qty: 30 TABLET | Refills: 3 | Status: SHIPPED
Start: 2025-06-26 | End: 2025-06-25

## 2025-06-25 RX ORDER — CLOPIDOGREL BISULFATE 75 MG/1
75 TABLET ORAL DAILY
Qty: 19 TABLET | Refills: 0 | Status: SHIPPED
Start: 2025-06-26 | End: 2025-06-25

## 2025-06-25 RX ORDER — SPIRONOLACTONE 25 MG/1
25 TABLET ORAL DAILY
Qty: 30 TABLET | Refills: 3 | Status: SHIPPED | OUTPATIENT
Start: 2025-06-26

## 2025-06-25 RX ORDER — CLOPIDOGREL BISULFATE 75 MG/1
75 TABLET ORAL DAILY
Qty: 19 TABLET | Refills: 0 | Status: SHIPPED | OUTPATIENT
Start: 2025-06-26 | End: 2025-07-15

## 2025-06-25 RX ORDER — BUMETANIDE 2 MG/1
2 TABLET ORAL DAILY
Qty: 30 TABLET | Refills: 3 | Status: SHIPPED | OUTPATIENT
Start: 2025-06-26

## 2025-06-25 RX ORDER — METOPROLOL SUCCINATE 25 MG/1
25 TABLET, EXTENDED RELEASE ORAL DAILY
Qty: 30 TABLET | Refills: 3 | Status: SHIPPED
Start: 2025-06-26 | End: 2025-06-25

## 2025-06-25 RX ORDER — METOPROLOL SUCCINATE 25 MG/1
25 TABLET, EXTENDED RELEASE ORAL DAILY
Qty: 30 TABLET | Refills: 3 | Status: SHIPPED | OUTPATIENT
Start: 2025-06-26

## 2025-06-25 RX ADMIN — ARFORMOTEROL TARTRATE 15 MCG: 15 SOLUTION RESPIRATORY (INHALATION) at 07:41

## 2025-06-25 RX ADMIN — PREDNISONE 40 MG: 20 TABLET ORAL at 08:45

## 2025-06-25 RX ADMIN — WATER 1000 MG: 1 INJECTION INTRAMUSCULAR; INTRAVENOUS; SUBCUTANEOUS at 08:43

## 2025-06-25 RX ADMIN — GUAIFENESIN 1200 MG: 600 TABLET, EXTENDED RELEASE ORAL at 08:45

## 2025-06-25 RX ADMIN — IPRATROPIUM BROMIDE AND ALBUTEROL SULFATE 1 DOSE: .5; 3 SOLUTION RESPIRATORY (INHALATION) at 13:04

## 2025-06-25 RX ADMIN — SPIRONOLACTONE 25 MG: 25 TABLET ORAL at 08:45

## 2025-06-25 RX ADMIN — AZITHROMYCIN DIHYDRATE 500 MG: 250 TABLET ORAL at 08:45

## 2025-06-25 RX ADMIN — IPRATROPIUM BROMIDE AND ALBUTEROL SULFATE 1 DOSE: .5; 3 SOLUTION RESPIRATORY (INHALATION) at 07:38

## 2025-06-25 RX ADMIN — BUMETANIDE 2 MG: 1 TABLET ORAL at 08:44

## 2025-06-25 RX ADMIN — SACUBITRIL AND VALSARTAN 1 TABLET: 24; 26 TABLET, FILM COATED ORAL at 08:45

## 2025-06-25 RX ADMIN — BUDESONIDE 500 MCG: 0.5 INHALANT RESPIRATORY (INHALATION) at 07:41

## 2025-06-25 RX ADMIN — SODIUM CHLORIDE, PRESERVATIVE FREE 10 ML: 5 INJECTION INTRAVENOUS at 08:45

## 2025-06-25 RX ADMIN — ASPIRIN 81 MG: 81 TABLET, COATED ORAL at 08:45

## 2025-06-25 RX ADMIN — CLOPIDOGREL BISULFATE 75 MG: 75 TABLET, FILM COATED ORAL at 08:45

## 2025-06-25 RX ADMIN — ENOXAPARIN SODIUM 40 MG: 100 INJECTION SUBCUTANEOUS at 08:43

## 2025-06-25 RX ADMIN — Medication 4 ML: at 07:38

## 2025-06-25 NOTE — PLAN OF CARE
I was informed about the findings of small left-sided pneumothorax on chest CT.  Patient is hemodynamically stable and oxygen requirements are improving.  This is most likely due to chest trauma from chest compressions.  Chest x-ray from yesterday was not reported to have pneumothorax.    I would recommend repeating a chest x-ray in 6 hours.  If there is no progression or significant pneumothorax on the chest x-ray, no further treatment is needed.  This is likely going to heal by itself.    Avoid using BiPAP overnight unless urgent.    Patient can remain at stepdown level of care.  Please reach out to ICU in case of change in clinical status  
  Problem: Chronic Conditions and Co-morbidities  Goal: Patient's chronic conditions and co-morbidity symptoms are monitored and maintained or improved  6/18/2025 1336 by Romina Oneill RN  Outcome: Progressing  6/18/2025 0118 by Jaimie Pineda RN  Outcome: Progressing     Problem: Discharge Planning  Goal: Discharge to home or other facility with appropriate resources  6/18/2025 1336 by Romina Oneill RN  Outcome: Progressing  6/18/2025 0118 by Jaimie Pineda RN  Outcome: Not Progressing     Problem: Safety - Medical Restraint  Goal: Remains free of injury from restraints (Restraint for Interference with Medical Device)  Description: INTERVENTIONS:  1. Determine that other, less restrictive measures have been tried or would not be effective before applying the restraint  2. Evaluate the patient's condition at the time of restraint application  3. Inform patient/family regarding the reason for restraint  4. Q2H: Monitor safety, psychosocial status, comfort, nutrition and hydration  6/18/2025 1336 by Romina Oneill RN  Outcome: Progressing  Flowsheets  Taken 6/18/2025 1000  Remains free of injury from restraints (restraint for interference with medical device):   Determine that other, less restrictive measures have been tried or would not be effective before applying the restraint   Evaluate the patient's condition at the time of restraint application   Inform patient/family regarding the reason for restraint   Every 2 hours: Monitor safety, psychosocial status, comfort, nutrition and hydration  Taken 6/18/2025 0800  Remains free of injury from restraints (restraint for interference with medical device):   Determine that other, less restrictive measures have been tried or would not be effective before applying the restraint   Every 2 hours: Monitor safety, psychosocial status, comfort, nutrition and hydration   Inform patient/family regarding the reason for restraint   Evaluate the patient's condition 
  Problem: Chronic Conditions and Co-morbidities  Goal: Patient's chronic conditions and co-morbidity symptoms are monitored and maintained or improved  6/21/2025 2152 by Araceli Moctezuma RN  Outcome: Progressing  6/21/2025 1245 by Becky Brian RN  Outcome: Progressing  Flowsheets (Taken 6/21/2025 0800)  Care Plan - Patient's Chronic Conditions and Co-Morbidity Symptoms are Monitored and Maintained or Improved: Monitor and assess patient's chronic conditions and comorbid symptoms for stability, deterioration, or improvement     Problem: Discharge Planning  Goal: Discharge to home or other facility with appropriate resources  6/21/2025 2152 by Araceli Moctezuma RN  Outcome: Progressing  6/21/2025 1245 by Becky Brian RN  Outcome: Progressing  Flowsheets (Taken 6/21/2025 0800)  Discharge to home or other facility with appropriate resources: Identify barriers to discharge with patient and caregiver     Problem: Pain  Goal: Verbalizes/displays adequate comfort level or baseline comfort level  6/21/2025 2152 by Araceli Moctezuma RN  Outcome: Progressing  6/21/2025 1245 by Becky Brian RN  Outcome: Progressing     Problem: Skin/Tissue Integrity  Goal: Skin integrity remains intact  Description: 1.  Monitor for areas of redness and/or skin breakdown  2.  Assess vascular access sites hourly  3.  Every 4-6 hours minimum:  Change oxygen saturation probe site  4.  Every 4-6 hours:  If on nasal continuous positive airway pressure, respiratory therapy assess nares and determine need for appliance change or resting period  6/21/2025 2152 by Araceli Moctezuma RN  Outcome: Progressing  6/21/2025 1245 by Becky Brian RN  Outcome: Progressing  Flowsheets (Taken 6/21/2025 0800)  Skin Integrity Remains Intact: Monitor for areas of redness and/or skin breakdown     Problem: Safety - Adult  Goal: Free from fall injury  6/21/2025 2152 by Araceli Moctezuma RN  Outcome: Progressing  6/21/2025 1245 by Becky Brian 
  Problem: Chronic Conditions and Co-morbidities  Goal: Patient's chronic conditions and co-morbidity symptoms are monitored and maintained or improved  Outcome: Progressing     Problem: Discharge Planning  Goal: Discharge to home or other facility with appropriate resources  Outcome: Progressing     Problem: Pain  Goal: Verbalizes/displays adequate comfort level or baseline comfort level  Outcome: Progressing     Problem: Skin/Tissue Integrity  Goal: Skin integrity remains intact  Description: 1.  Monitor for areas of redness and/or skin breakdown  2.  Assess vascular access sites hourly  3.  Every 4-6 hours minimum:  Change oxygen saturation probe site  4.  Every 4-6 hours:  If on nasal continuous positive airway pressure, respiratory therapy assess nares and determine need for appliance change or resting period  Outcome: Progressing  Flowsheets (Taken 6/23/2025 2000)  Skin Integrity Remains Intact: Monitor for areas of redness and/or skin breakdown     Problem: Safety - Adult  Goal: Free from fall injury  Outcome: Progressing  Flowsheets (Taken 6/23/2025 2000)  Free From Fall Injury: Instruct family/caregiver on patient safety     Problem: Cardiovascular - Adult  Goal: Maintains optimal cardiac output and hemodynamic stability  Outcome: Progressing     
  Problem: Chronic Conditions and Co-morbidities  Goal: Patient's chronic conditions and co-morbidity symptoms are monitored and maintained or improved  Outcome: Progressing  Flowsheets (Taken 6/21/2025 0800)  Care Plan - Patient's Chronic Conditions and Co-Morbidity Symptoms are Monitored and Maintained or Improved: Monitor and assess patient's chronic conditions and comorbid symptoms for stability, deterioration, or improvement     Problem: Discharge Planning  Goal: Discharge to home or other facility with appropriate resources  Outcome: Progressing  Flowsheets (Taken 6/21/2025 0800)  Discharge to home or other facility with appropriate resources: Identify barriers to discharge with patient and caregiver     Problem: Pain  Goal: Verbalizes/displays adequate comfort level or baseline comfort level  Outcome: Progressing     Problem: Skin/Tissue Integrity  Goal: Skin integrity remains intact  Description: 1.  Monitor for areas of redness and/or skin breakdown  2.  Assess vascular access sites hourly  3.  Every 4-6 hours minimum:  Change oxygen saturation probe site  4.  Every 4-6 hours:  If on nasal continuous positive airway pressure, respiratory therapy assess nares and determine need for appliance change or resting period  Outcome: Progressing  Flowsheets (Taken 6/21/2025 0800)  Skin Integrity Remains Intact: Monitor for areas of redness and/or skin breakdown     Problem: Safety - Adult  Goal: Free from fall injury  Outcome: Progressing     Problem: Nutrition Deficit:  Goal: Optimize nutritional status  Outcome: Progressing     Problem: Respiratory - Adult  Goal: Achieves optimal ventilation and oxygenation  6/21/2025 1245 by Becky Brian, RN  Outcome: Progressing  Flowsheets (Taken 6/21/2025 0800)  Achieves optimal ventilation and oxygenation: Assess for changes in respiratory status  6/21/2025 0753 by Jocelyne Kelly, RT  Outcome: Progressing  6/21/2025 0630 by Edita Yanez RCP  Outcome: Progressing   
  Problem: Chronic Conditions and Co-morbidities  Goal: Patient's chronic conditions and co-morbidity symptoms are monitored and maintained or improved  Outcome: Progressing  Flowsheets (Taken 6/24/2025 2000)  Care Plan - Patient's Chronic Conditions and Co-Morbidity Symptoms are Monitored and Maintained or Improved: Monitor and assess patient's chronic conditions and comorbid symptoms for stability, deterioration, or improvement     Problem: Discharge Planning  Goal: Discharge to home or other facility with appropriate resources  Outcome: Progressing  Flowsheets (Taken 6/24/2025 2000)  Discharge to home or other facility with appropriate resources: Identify barriers to discharge with patient and caregiver     Problem: Pain  Goal: Verbalizes/displays adequate comfort level or baseline comfort level  Outcome: Progressing  Flowsheets (Taken 6/24/2025 2000)  Verbalizes/displays adequate comfort level or baseline comfort level: Encourage patient to monitor pain and request assistance     Problem: Skin/Tissue Integrity  Goal: Skin integrity remains intact  Description: 1.  Monitor for areas of redness and/or skin breakdown  2.  Assess vascular access sites hourly  3.  Every 4-6 hours minimum:  Change oxygen saturation probe site  4.  Every 4-6 hours:  If on nasal continuous positive airway pressure, respiratory therapy assess nares and determine need for appliance change or resting period  Outcome: Progressing  Flowsheets (Taken 6/24/2025 2000)  Skin Integrity Remains Intact: Monitor for areas of redness and/or skin breakdown     Problem: Safety - Adult  Goal: Free from fall injury  Outcome: Progressing     Problem: Nutrition Deficit:  Goal: Optimize nutritional status  Outcome: Progressing     Problem: Respiratory - Adult  Goal: Achieves optimal ventilation and oxygenation  6/25/2025 0019 by Lisbeth Oliveira, RN  Outcome: Progressing  6/24/2025 2029 by Messi Russo RCP  Outcome: Progressing  Flowsheets (Taken 6/24/2025 
  Problem: Discharge Planning  Goal: Discharge to home or other facility with appropriate resources  6/18/2025 1336 by Romina Oneill, RN  Outcome: Progressing  6/18/2025 0118 by Jaimie Pineda, RN  Outcome: Not Progressing     
  Problem: Occupational Therapy - Adult  Goal: By Discharge: Performs self-care activities at highest level of function for planned discharge setting.  See evaluation for individualized goals.  Description: FUNCTIONAL STATUS PRIOR TO ADMISSION:    Receives Help From: Family, Prior Level of Assist for ADLs: Needs assistance, Bath: Independent, Dressing: Minimal assistance (needs assistance with pulling up underwear and pants,help with putting on socks), Grooming: Independent, Feeding: Independent, Toileting: Independent, Prior Level of Assist for Homemaking: Independent, Ambulation Assistance: Needs assistance (uses a cane when ambulating), Prior Level of Assist for Transfers: Independent, Active : Yes     HOME SUPPORT: Patient lived alone and was independent in the home and Mod I with SPC outside of home for mobility.    Occupational Therapy Goals:  Initiated 6/19/2025  1.  Patient will perform upper body dressing with Stand by Assist and Set-up within 7 day(s).  2.  Patient will perform grooming routine in standing with Minimal Assist within 7 day(s).  3.  Patient will perform lower body dressing with Minimal Assist within 7 day(s).  4.  Patient will perform toilet transfers with Minimal Assist  within 7 day(s).  5.  Patient will perform all aspects of toileting with Minimal Assist within 7 day(s).  6.  Patient will participate in upper extremity therapeutic exercise/activities with Modified [place] for 8 minutes within 7 day(s).    7.  Patient will utilize energy conservation techniques during functional activities with verbal and visual cues within 7 day(s).    Outcome: Progressing    OCCUPATIONAL THERAPY TREATMENT  Patient: Tasha Kyle (69 y.o. female)  Date: 6/23/2025  Primary Diagnosis: Cardiac arrest (HCC) [I46.9]  Flash pulmonary edema (HCC) [J81.0]  Hypertensive emergency [I16.1]  Acute respiratory failure with hypoxia and hypercapnia (HCC) [J96.01, J96.02]       Precautions: Fall Risk  
  Problem: Occupational Therapy - Adult  Goal: By Discharge: Performs self-care activities at highest level of function for planned discharge setting.  See evaluation for individualized goals.  Description: FUNCTIONAL STATUS PRIOR TO ADMISSION:    Receives Help From: Family, Prior Level of Assist for ADLs: Needs assistance, Bath: Independent, Dressing: Minimal assistance (needs assistance with pulling up underwear and pants,help with putting on socks), Grooming: Independent, Feeding: Independent, Toileting: Independent, Prior Level of Assist for Homemaking: Independent, Ambulation Assistance: Needs assistance (uses a cane when ambulating), Prior Level of Assist for Transfers: Independent, Active : Yes     HOME SUPPORT: Patient lived alone and was independent in the home and Mod I with SPC outside of home for mobility.    Occupational Therapy Goals:  Initiated 6/19/2025  1.  Patient will perform upper body dressing with Stand by Assist and Set-up within 7 day(s).  2.  Patient will perform grooming routine in standing with Minimal Assist within 7 day(s).  3.  Patient will perform lower body dressing with Minimal Assist within 7 day(s).  4.  Patient will perform toilet transfers with Minimal Assist  within 7 day(s).  5.  Patient will perform all aspects of toileting with Minimal Assist within 7 day(s).  6.  Patient will participate in upper extremity therapeutic exercise/activities with Modified [place] for 8 minutes within 7 day(s).    7.  Patient will utilize energy conservation techniques during functional activities with verbal and visual cues within 7 day(s).    Outcome: Progressing     OCCUPATIONAL THERAPY TREATMENT  Patient: Tasha Kyle (69 y.o. female)  Date: 6/24/2025  Primary Diagnosis: Cardiac arrest (HCC) [I46.9]  Flash pulmonary edema (HCC) [J81.0]  Hypertensive emergency [I16.1]  Acute respiratory failure with hypoxia and hypercapnia (HCC) [J96.01, J96.02]       Precautions: Fall Risk 
  Problem: Physical Therapy - Adult  Goal: By Discharge: Performs mobility at highest level of function for planned discharge setting.  See evaluation for individualized goals.  Description: FUNCTIONAL STATUS PRIOR TO ADMISSION: Patient was modified independent using a single point cane intermittently for functional mobility.    HOME SUPPORT PRIOR TO ADMISSION: The patient lived alone with family that lives locally.    Physical Therapy Goals  Initiated 6/19/2025  1.  Patient will move from supine to sit and sit to supine and roll side to side in bed with contact guard assist within 7 day(s).    2.  Patient will perform sit to stand with minimal assistance within 7 day(s).  3.  Patient will transfer from bed to chair and chair to bed with minimal assistance using the least restrictive device within 7 day(s).  4.  Patient will ambulate with minimal assistance for 50 feet with the least restrictive device within 7 day(s).   5.  Patient will ascend/descend 4 stairs with single handrail(s) with minimal assistance within 7 day(s).    6/19/2025 1249 by Charisse Wallace, PT  Outcome: Progressing   PHYSICAL THERAPY EVALUATION    Patient: Tasha Kyle (69 y.o. female)  Date: 6/19/2025  Primary Diagnosis: Cardiac arrest (HCC) [I46.9]  Flash pulmonary edema (HCC) [J81.0]  Hypertensive emergency [I16.1]  Acute respiratory failure with hypoxia and hypercapnia (HCC) [J96.01, J96.02]       Precautions: Restrictions/Precautions  Restrictions/Precautions: Fall Risk            ASSESSMENT :   DEFICITS/IMPAIRMENTS:   The patient is limited by decreased functional mobility, strength (RLE decreased 4/5), activity tolerance, coordination, balance, gait s/p admission on 6/17 with respiratory distress after receiving 1L of fluid. Pt underwent PEA arrest and received 3 rounds of CPR and intubation. Pt extubated on 6/18 and on 5L of oxygen. Pt oriented x 4 and impaired speech noted.     Based on the impairments listed above pt 
  Problem: Physical Therapy - Adult  Goal: By Discharge: Performs mobility at highest level of function for planned discharge setting.  See evaluation for individualized goals.  Description: FUNCTIONAL STATUS PRIOR TO ADMISSION: Patient was modified independent using a single point cane intermittently for functional mobility.    HOME SUPPORT PRIOR TO ADMISSION: The patient lived alone with family that lives locally.    Physical Therapy Goals  Initiated 6/19/2025  1.  Patient will move from supine to sit and sit to supine and roll side to side in bed with contact guard assist within 7 day(s).    2.  Patient will perform sit to stand with minimal assistance within 7 day(s).  3.  Patient will transfer from bed to chair and chair to bed with minimal assistance using the least restrictive device within 7 day(s).  4.  Patient will ambulate with minimal assistance for 50 feet with the least restrictive device within 7 day(s).   5.  Patient will ascend/descend 4 stairs with single handrail(s) with minimal assistance within 7 day(s).    Outcome: Progressing     PHYSICAL THERAPY TREATMENT    Patient: Tasha Kyle (69 y.o. female)  Date: 6/23/2025  Diagnosis: Cardiac arrest (HCC) [I46.9]  Flash pulmonary edema (HCC) [J81.0]  Hypertensive emergency [I16.1]  Acute respiratory failure with hypoxia and hypercapnia (HCC) [J96.01, J96.02] Cardiac arrest (HCC)      Precautions: Restrictions/Precautions  Restrictions/Precautions: Fall Risk            ASSESSMENT:  Patient continues to benefit from skilled PT services and is progressing towards goals. Continues to demo R omari-deficits including attention, coordination and strength. While deficits persist, pt is demonstrating inc'd use of R side with intermittent cuing throughout session. Requires overall min A for transfers, gait and standing balance. Will continue to benefit from therapy to address deficits and optimize mobility. Pt does demo mild insight deficits re: need for 
  Problem: Physical Therapy - Adult  Goal: By Discharge: Performs mobility at highest level of function for planned discharge setting.  See evaluation for individualized goals.  Description: FUNCTIONAL STATUS PRIOR TO ADMISSION: Patient was modified independent using a single point cane intermittently for functional mobility.    HOME SUPPORT PRIOR TO ADMISSION: The patient lived alone with family that lives locally.    Physical Therapy Goals  Initiated 6/19/2025  1.  Patient will move from supine to sit and sit to supine and roll side to side in bed with contact guard assist within 7 day(s).    2.  Patient will perform sit to stand with minimal assistance within 7 day(s).  3.  Patient will transfer from bed to chair and chair to bed with minimal assistance using the least restrictive device within 7 day(s).  4.  Patient will ambulate with minimal assistance for 50 feet with the least restrictive device within 7 day(s).   5.  Patient will ascend/descend 4 stairs with single handrail(s) with minimal assistance within 7 day(s).    Outcome: Progressing     PHYSICAL THERAPY TREATMENT    Patient: Tasha Kyle (69 y.o. female)  Date: 6/25/2025  Diagnosis: Cardiac arrest (HCC) [I46.9]  Flash pulmonary edema (HCC) [J81.0]  Hypertensive emergency [I16.1]  Acute respiratory failure with hypoxia and hypercapnia (HCC) [J96.01, J96.02] Cardiac arrest (HCC)      Precautions: Restrictions/Precautions  Restrictions/Precautions: Fall Risk            ASSESSMENT:  Patient continues to benefit from skilled PT services and is progressing towards goals. Patient tolerated session well. Mobilized with CGA from bed and around room with the RW. Demonstrates improved attention to R side, no overt buckling. Patient would continue to benefit from PT intervention to maximize functional independence and safety, given deficits in activity tolerance, R hemiparesis, and impaired balance.     06/25/25 1345 06/25/25 1359   Vitals   Pulse 81 79 
  Problem: Safety - Adult  Goal: Free from fall injury  Outcome: Progressing     
  Problem: Safety - Medical Restraint  Goal: Remains free of injury from restraints (Restraint for Interference with Medical Device)  Description: INTERVENTIONS:  1. Determine that other, less restrictive measures have been tried or would not be effective before applying the restraint  2. Evaluate the patient's condition at the time of restraint application  3. Inform patient/family regarding the reason for restraint  4. Q2H: Monitor safety, psychosocial status, comfort, nutrition and hydration  Outcome: Progressing  Flowsheets  Taken 6/17/2025 2000 by Jaimie Pineda RN  Remains free of injury from restraints (restraint for interference with medical device):   Determine that other, less restrictive measures have been tried or would not be effective before applying the restraint   Evaluate the patient's condition at the time of restraint application   Inform patient/family regarding the reason for restraint   Every 2 hours: Monitor safety, psychosocial status, comfort, nutrition and hydration  Taken 6/17/2025 1723 by Sneha Zamudio RN  Remains free of injury from restraints (restraint for interference with medical device):   Determine that other, less restrictive measures have been tried or would not be effective before applying the restraint   Evaluate the patient's condition at the time of restraint application   Inform patient/family regarding the reason for restraint   Every 2 hours: Monitor safety, psychosocial status, comfort, nutrition and hydration     Problem: Safety - Adult  Goal: Free from fall injury  Outcome: Progressing     Problem: Discharge Planning  Goal: Discharge to home or other facility with appropriate resources  Outcome: Not Progressing     
Problem: Physical Therapy - Adult  Goal: By Discharge: Performs mobility at highest level of function for planned discharge setting.  See evaluation for individualized goals.  Description: FUNCTIONAL STATUS PRIOR TO ADMISSION: Patient was modified independent using a single point cane intermittently for functional mobility.    HOME SUPPORT PRIOR TO ADMISSION: The patient lived alone with family that lives locally.    Physical Therapy Goals  Initiated 6/19/2025  1.  Patient will move from supine to sit and sit to supine and roll side to side in bed with contact guard assist within 7 day(s).    2.  Patient will perform sit to stand with minimal assistance within 7 day(s).  3.  Patient will transfer from bed to chair and chair to bed with minimal assistance using the least restrictive device within 7 day(s).  4.  Patient will ambulate with minimal assistance for 50 feet with the least restrictive device within 7 day(s).   5.  Patient will ascend/descend 4 stairs with single handrail(s) with minimal assistance within 7 day(s).    Outcome: Progressing     PHYSICAL THERAPY TREATMENT    Patient: Tasha Kyle (69 y.o. female)  Date: 6/20/2025  Diagnosis: Cardiac arrest (HCC) [I46.9]  Flash pulmonary edema (HCC) [J81.0]  Hypertensive emergency [I16.1]  Acute respiratory failure with hypoxia and hypercapnia (HCC) [J96.01, J96.02] Cardiac arrest (HCC)      Precautions: Restrictions/Precautions  Restrictions/Precautions: Fall Risk          ASSESSMENT:  Patient continues to benefit from skilled PT services and is progressing towards goals. Since eval, patient with MRI positive for acute infarction superior L frontal lobe. Patient continues to demonstrate mild impairments on R omari-body including strength, coordination, and attention.Patient able to progress overall standing tolerance this date. Improved ability to maintain standing, weight shift laterally especially to L with use of rolling walker for support. Able to 
Speech LAnguage Pathology EVALUATION    Patient: Tasha Kyle (69 y.o. female)  Date: 6/19/2025  Primary Diagnosis: Cardiac arrest (HCC) [I46.9]  Flash pulmonary edema (HCC) [J81.0]  Hypertensive emergency [I16.1]  Acute respiratory failure with hypoxia and hypercapnia (HCC) [J96.01, J96.02]       Precautions:        aspiration             ASSESSMENT :  Patient with moderately hoarse/ breathy/weak voice with mild-moderate dysarthria. Family reports voice was hoarse prior to admission, but dysarthria new.    She had audible inhalations, wet cough at times and aspiration risks due to cardiac arrest with intubation 6-17-25 with extubation 6-18-25 after [employer].  Dx; pulm edema, cardiac arrest.   PMH: lung CA, pulm edema,  GERD, TIA vs CVA anxiety, CHF.      Patient will benefit from skilled intervention to address the above impairments.     PLAN :  Recommendations and Planned Interventions:  Diet: NPO  FEES today to determine severity of pharyngeal dysphagia and possibly appropriate diet.          Acute SLP Services:  . Patient's rehabilitation potential is considered to be Good.  Discharge Recommendations: Continue to assess pending progress     SUBJECTIVE:   Patient stated, “Can I have some more of that.”    OBJECTIVE:     Past Medical History:   Diagnosis Date    Anxiety     Arthritis     bilateral knees, lower back Deg. 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 
Speech LAnguage Pathology TREATMENT    Patient: Tasha Kyle (69 y.o. female)  Date: 6/20/2025  Primary Diagnosis: Cardiac arrest (HCC) [I46.9]  Flash pulmonary edema (HCC) [J81.0]  Hypertensive emergency [I16.1]  Acute respiratory failure with hypoxia and hypercapnia (HCC) [J96.01, J96.02]       Precautions:  Fall Risk         aspiration         ASSESSMENT :  Patient currently with moderate dysphonia (possibly related to hypomobile vc noted on FEES), mild-moderate dysarthria (related to newly dx L frontal CVA), and mild-moderate pharyngeal dysphagia (related to intubation and vc hypomobility).    She will need to continue mildly thick liquids until dysphonia improves.       Patient will benefit from skilled intervention to address the above impairments.     PLAN :  Recommendations and Planned Interventions:  Diet: upgrade diet to regular diet. CONTINUE MILDLY THICK LIQUIDS.   Upright for all PO  Stop eating if coughing until cough subsides.          Acute SLP Services: Yes, SLP will continue to follow per plan of care.  Discharge Recommendations: Yes, recommend SLP treatment at next level of care     SUBJECTIVE:   Patient stated, “My goal is to get out of the ICU by  Tuesday.”    OBJECTIVE:     Past Medical History:   Diagnosis Date    Anxiety     Arthritis     bilateral knees, lower back Deg. 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 
Speech Language Pathology  Flexible Endoscopic Evaluation of Swallowing-FEES  Patient: Tasha Kyle (69 y.o. female)  Date: 6/19/2025  Primary Diagnosis: Cardiac arrest (HCC) [I46.9]  Flash pulmonary edema (HCC) [J81.0]  Hypertensive emergency [I16.1]  Acute respiratory failure with hypoxia and hypercapnia (HCC) [J96.01, J96.02]       Precautions:   Fall Risk                  ASSESSMENT :  Patient presents with mild to moderate oral dysphagia and moderate pharyngeal dysphagia based on FEES results. She was able to extract a bolus from spoon and straw, manipulate a cracker. There was intermittent posterior spillage after the initial swallow, indicating piecemeal deglutition. Pharyngeal swallow was generally triggered with the head of the bolus over the epiglottis, at the pyriforms for thin liquids. There was presumed decreased epiglottic inversion and laryngeal closure which leads to vallecular residue of mildly thick and puree, which eventually clears with a second swallow. There is penetration to the vocal cords with thin liquids. Coughing did not correlate with aspiration.   Of note, patient's left vocal cord did not appear to move. Also note, family reports a degree of hoarse vocal quality at baseline, along with prior chest CT results 3/18/25 showing, \"1. Limited study. No evidence of pulmonary embolus.   2. Right upper lobe masslike opacity. Recommend correlation with history. There  is confluent soft tissue in the subcarinal region extending around the bilateral  bronchi which are narrowed which may represent malignancy or treated  lymphadenopathy. No prior studies are available for comparison   3. Probable pulmonary edema and airspace opacities which may reflect  superimposed infection\" question baseline vocal quality.        Patient will benefit from skilled intervention to address the above impairments.     PLAN :  Recommendations and Planned Interventions:  Diet: Soft and bite sized and mildly 
Spoke to Dr. Hammond @0019 who advised patient + for \"tiny\" infarct on brain. Charge RN Padmini advised of read @ 1595. She advised she would let primary RN know .  
Mobility:  Bed Mobility Training  Bed Mobility Training: Yes  Supine to Sit: Stand by assistance  Scooting: Stand by assistance  Bed flat, able to manage own bed linens but with increased time and repeated efforts  Transfers:  Transfer Training  Transfer Training: Yes  Interventions: Safety awareness training;Verbal cues  Sit to Stand: Contact guard assistance;Minimal assistance  Stand to Sit: Contact guard assistance  Toilet Transfer: Contact guard assistance  Balance:  Balance  Sitting - Static: Good (unsupported)  Sitting - Dynamic: Fair (occasional)  Standing - Static: Unsupported;Good  Standing - Dynamic: Unsupported;Fair   Ambulation/Gait Training:     Gait  Gait Training: Yes  Overall Level of Assistance: Minimal assistance;Contact guard assistance  Distance (ft):  (15ft x 1, 20ft x 1)  Assistive Device: Gait belt  Interventions: Verbal cues;Safety awareness training  Base of Support: Widened;Shift to left  Speed/Mary Carmen: Pace decreased (< 100 feet/min);Slow;Shuffled  Step Length: Right shortened  Gait Abnormalities: Decreased step clearance;Path deviations;Trunk sway increased;Altered arm swing   Right foot does not clear floor consistently, right step length decreased and does not pass LLE with gait, minimal/no right UE swing, slight right trunk rotation / lateral trunk lean at times   No overt loss of balance      Neuro Re-Education:   Right hip flexion 4/5  Right knee and ankle 4+/5  LLE grossly 4+/5  Static and dynamic sitting and standing challenges with prompting/cues to engage right UE and lower extremity  Visual scanning challenges   Right sided sensation grossly intact for light touch                  Pain Rating:  denied  Pain Intervention(s):       Activity Tolerance:   Good    After treatment:   Patient left in no apparent distress sitting up in chair, Call bell within reach, Bed/ chair alarm activated, and Updated patient's board on functional status and mobility 
(unsupported)  Sitting - Dynamic: Fair (occasional)  Standing: Impaired  Standing - Static: Constant support;Fair  Standing - Dynamic: Constant support;Fair      ADL Assessment:          Feeding: NPO       Grooming: Minimal assistance       UE Bathing: Minimal assistance       Product Used : Incontinent cleanser;Bath wipes    LE Bathing: Moderate assistance       UE Dressing: Minimal assistance       LE Dressing: Maximum assistance       Toileting: Maximum assistance                         ADL Intervention and task modifications:        Pt educated on safe transfer techniques, with specific emphasis on proper hand placement to push up from seated surface rather than attempt to pull self up, fully positioning self in-front of desired seated location, feeling chair on back of legs and reaching back with 1-2 UE to slowly lower self to seated position.          [facility]\"                                                       Daily Activity Inpatient Short Form  AM-PAC Daily Activity - Inpatient   How much help is needed for putting on and taking off regular lower body clothing?: A Lot  How much help is needed for bathing (which includes washing, rinsing, drying)?: A Lot  How much help is needed for toileting (which includes using toilet, bedpan, or urinal)?: A Lot  How much help is needed for putting on and taking off regular upper body clothing?: A Little  How much help is needed for taking care of personal grooming?: A Little  How much help for eating meals?: A Little  [facility] Raw Score: 15  AM-PAC Inpatient ADL T-Scale Score : 34.69  ADL Inpatient CMS 0-100% Score: 56.46  ADL Inpatient CMS G-Code Modifier : CK     Interpretation of Tool:  Represents clinically-significant functional categories (i.e. Activities of daily living).  Cutoff score 39.4 (19) correlates to a good likelihood of discharging home versus a facility  Alis Chi Vinoth K. 
                                                                                                           Pain Rating:  Pt with acute lower rib pain; does not rate; exacerbated by trunk flexion   Pain Intervention(s):   nursing notified      Activity Tolerance:   Fair , requires frequent rest breaks, SpO2 stable on room air, and RR increased to high 30s with mobility  Please refer to the flowsheet for vital signs taken during this treatment.    After treatment:   Patient left in no apparent distress sitting up in chair, Call bell within reach, Bed/ chair alarm activated, and Caregiver / family present    COMMUNICATION/EDUCATION:   The patient's plan of care was discussed with: physical therapist and registered nurse    Patient Education  Education Given To: Patient;Family  Education Provided: Role of Therapy;Transfer Training;Plan of Care;Mobility Training;Fall Prevention Strategies;Family Education  Education Method: Demonstration;Verbal;Teach Back  Barriers to Learning: Cognition (impulsivity)  Education Outcome: Verbalized understanding;Demonstrated understanding;Continued education needed    Thank you for this referral.  Cristiana Ann OT  Minutes: 26

## 2025-06-25 NOTE — PROGRESS NOTES
[facility]                    Cardiology Care Note     []Initial Encounter     [x]Follow-up    Patient Name: Tasha Kyle - :1955 - MRN:[id number]  Primary Cardiologist: None  Consulting Cardiologist: Scott Cisse MD     Reason for encounter: Hypoxic Resp Failure/PEA cardiac arrest    HPI:       Tasha Kyle is a 69 y.o. female with PMH significant for TIA, non-small cell lung cancer, asthma, chronic HFpEF who is admitted for hypoxic respiratory failure and PEA cardiac arrest while being intubated on 2025.  She underwent 3 cycles of CPR/ROSC.  Rhythm was PEA.  Her pH was 6.9.  Postarrest, patient was significantly hypotensive and was started on nitroglycerin gtt but then became hypotensive requiring vasopressors..  She also received lidocaine for wide-complex tachycardia.  Her heart rate trended down and showed sinus tachycardia.  Also received IV diuretics.  Patient was extubated on 2025.  Troponins elevated.  Cardiology consulted.    Subjective:      Tasha Kyle reports dyspnea.     Assessment and Plan     Acute hypoxic/hypercapnic respiratory failure/PEA cardiac arrest-2025 presumed to be secondary to hypoxia.  Extubated on 2025.    Elevated troponins-in the setting of PEA cardiac arrest/hypoxic respiratory failure requiring intubation  - ischemic eval when underlying conditions improve    Acute on chronic HfmrEF    anemia      CHASE - Cr 1.43    Non-small cell lung cancer      On aspirin.  - cont IV bumex  - add BB, entresto as hemodynamics allow   - needs transfusion        ____________________________________________________________    Cardiac testing  25    ECHO (TTE) LIMITED (PRN CONTRAST/BUBBLE/STRAIN/3D) 2025  4:45 PM (Final)    Interpretation Summary    Left Ventricle: Mildly reduced left ventricular systolic function with a visually estimated EF of 45 - 50%. 
                                                               [facility]                    Cardiology Care Note     []Initial Encounter     [x]Follow-up    Patient Name: Tasha Kyle - :1955 - MRN:[id number]  Primary Cardiologist: None  Consulting Cardiologist: Scott Cisse MD     Reason for encounter: Hypoxic Resp Failure/PEA cardiac arrest    HPI:       Tasha Kyle is a 69 y.o. female with PMH significant for TIA, non-small cell lung cancer, asthma, chronic HFpEF who is admitted for hypoxic respiratory failure and PEA cardiac arrest while being intubated on 2025.  She underwent 3 cycles of CPR/ROSC.  Rhythm was PEA.  Her pH was 6.9.  Postarrest, patient was significantly hypotensive and was started on nitroglycerin gtt but then became hypotensive requiring vasopressors..  She also received lidocaine for wide-complex tachycardia.  Her heart rate trended down and showed sinus tachycardia.  Also received IV diuretics.  Patient was extubated on 2025.  Troponins elevated.  Cardiology consulted.    Subjective:      Tasha Kyle reports none     Assessment and Plan     Acute hypoxic/hypercapnic respiratory failure/PEA cardiac arrest-2025 presumed to be secondary to hypoxia.  Extubated on 2025.    Elevated troponins-in the setting of PEA cardiac arrest/hypoxic respiratory failure requiring intubation  - ischemic eval when underlying conditions improve and anemia is resolved   - asa  - nuclear stress this am with no ischemia    Acute on chronic HfmrEF  - bumex 2 mg po qd   - cont toprol xl 25 mg today   - cont entresto  - add aldactone from am     anemia      CHASE - Cr 1.43 > 1.06       Hx Non-small cell lung cancer  - details unknown    Will sign off and see prn   Pls ines if needed  Will arrange OP f.u        ____________________________________________________________    Cardiac testing  25    ECHO (TTE) LIMITED (PRN CONTRAST/BUBBLE/STRAIN/3D) 
                                                               [facility]                    Cardiology Care Note     []Initial Encounter     [x]Follow-up    Patient Name: Tasha Kyle - :1955 - MRN:[id number]  Primary Cardiologist: None  Consulting Cardiologist: Scott Cisse MD     Reason for encounter: Hypoxic Resp Failure/PEA cardiac arrest    HPI:       Tasha Kyle is a 69 y.o. female with PMH significant for TIA, non-small cell lung cancer, asthma, chronic HFpEF who is admitted for hypoxic respiratory failure and PEA cardiac arrest while being intubated on 2025.  She underwent 3 cycles of CPR/ROSC.  Rhythm was PEA.  Her pH was 6.9.  Postarrest, patient was significantly hypotensive and was started on nitroglycerin gtt but then became hypotensive requiring vasopressors..  She also received lidocaine for wide-complex tachycardia.  Her heart rate trended down and showed sinus tachycardia.  Also received IV diuretics.  Patient was extubated on 2025.  Troponins elevated.  Cardiology consulted.    Subjective:      Tasha Kyle reports dyspnea.     Assessment and Plan     Acute hypoxic/hypercapnic respiratory failure/PEA cardiac arrest-2025 presumed to be secondary to hypoxia.  Extubated on 2025.    Elevated troponins-in the setting of PEA cardiac arrest/hypoxic respiratory failure requiring intubation  - ischemic eval when underlying conditions improve and anemia is resolved   - asa    Acute on chronic HfmrEF  - cont IV bumex , change to po tomorrow   - add toprol xl 25 mg today   - entresto as hemodynamics allow   anemia      CHASE - Cr 1.43 > 1.10      Hx Non-small cell lung cancer  - details unknown      D/w pt/dtr at bedside      ____________________________________________________________    Cardiac testing  25    ECHO (TTE) LIMITED (PRN CONTRAST/BUBBLE/STRAIN/3D) 2025  4:45 PM (Final)    Interpretation Summary    Left Ventricle: 
     [facility] INITIAL ASSESSMENT.      Name: Tasha Kyle   : 1955   MRN: [id number]   Date: 2025        Chief Complaint   Patient presents with    Respiratory Distress         HPI:     69-year-old female with history significant for TIA, non-small cell lung cancer, anxiety, asthma, heart failure with preserved ejection fraction who presented to the emergency room with significant hypoxia.  Unfortunately, details are limited since family is not accompanying the patient.  Per report from emergency room, patient was noted to be significantly hypoxic with oxygen saturation in 60s.  She was given 1 L fluid initially, however, given worsening hypoxia and respiratory failure, she then received some Lasix.  Upon arrival to the emergency room, she was in significant respiratory distress and severely hypoxic.  She was intubated in the emergency room, however, developed cardiac arrest.  Intubation.  She underwent 3 cycles of CPR with ROSC.  Rhythm was PEA.  Postarrest, brief echocardiogram revealed normal LV function and mild pericardial effusion without any evidence of tamponade.  RV did not appear to be dilated or malfunctioning.  Labs were notable for significant metabolic acidosis, pH 6.9.  She received 3 A of bicarbonate.    Postarrest, patient was significantly hypertensive and started on nitroglycerin as well as propofol.  She had sinus tachycardia.  Initially, heart rate was around 170 with a potential wide-complex tachycardia, so she received 100 mg lidocaine.  Later, her heart rate was in 160s with sinus tachycardia.  Patient became hypoxic upon transitioning to ventilator.  PEEP was increased to 15 and tidal volume was increased to 450 along with increasing nighttime.  Oxygen saturation improved to 91%    : Stable overnight.  Vasopressor requirement improved.  Good urine output.  FiO2 improved.  Able to follow commands with nonfocal neuroexam upon SAT.  Did well with SBT.  
    Hospitalist Progress Note      NAME:  Tasha Kyle   :  1955  MRM:  [id number]    Date/Time: 2025  9:55 AM           Assessment / Plan:     69-year-old female with history significant for TIA, non-small cell lung cancer, anxiety, asthma, HFpEF  who presented to the emergency room with significant hypoxia, s/p cardiac arrest     Cardiopulmonary  ## Acute hypoxic and hypercapnic respiratory failure: stable post extubation 2025   #Pulmonary edema/heart failure/cardiac arrest  #Pneumothorax  Chest x-ray : Extensive bilateral patchy densities appear decreased compatible with decreasing pulmonary edema.  CT chest:    Small-moderate left pneumothorax.   Prominent bilateral lower lobe opacities which could be on the basis of  atelectasis, pneumonia, or combination.   Masslike opacity of the right upper lobe again demonstrated with interval  diminished adjacent pleural thickening along minor fissure.  Currently on 2-3 L nasal cannula  -Speech has evaluated the patient: FEES completed and full note to follow. Initiating soft and bite diet with mildly thick liquids.   Pulmonology has evaluated the patient  Chest x-ray : Decreasing 4 mm left apical pneumothorax, previously 11 mm   Plan  - Continue high flow nasal cannula and wean as tolerated   - Continue Bumex IV, switch to PO tomorrow   - BiPAP only if needed  - Continue Brovana Pulmicort  Daily chest x-ray for pneumothorax  Appreciate pulmonary recommendation     # Cardiac arrest:  #PEA arrest  #Elevated trops   - PEA arrest due to respiratory status and induction.  - DINORA mild global hypokinesis - 45-50% EF.  No DVT noted in bilateral femoral veins.  - Lactic acidosis resolved.  Troponin elevation related to CPR.  Trending down.  -Cardiology has evaluated the patient  Plan  - Patient will benefit from ischemic eval, appreciate cardiology recommendations     Acute on chronic HfmrEF  EF  Left Ventricle: Mildly reduced left ventricular 
    Hospitalist Progress Note      NAME:  Tasha Kyle   :  1955  MRM:  [id number]    Date/Time: 2025  11:36 AM           Assessment / Plan:     69-year-old female with history significant for TIA, non-small cell lung cancer, anxiety, asthma, HFpEF  who presented to the emergency room with significant hypoxia, s/p cardiac arrest     Cardiopulmonary  ## Acute hypoxic and hypercapnic respiratory failure: stable post extubation 2025   #Pulmonary edema/heart failure/cardiac arrest  #Pneumothorax  Chest x-ray : Extensive bilateral patchy densities appear decreased compatible with decreasing pulmonary edema.  CT chest:    Small-moderate left pneumothorax.   Prominent bilateral lower lobe opacities which could be on the basis of  atelectasis, pneumonia, or combination.   Masslike opacity of the right upper lobe again demonstrated with interval  diminished adjacent pleural thickening along minor fissure.  Currently on 2-3 L nasal cannula  -Speech has evaluated the patient: FEES completed and full note to follow. Initiating soft and bite diet with mildly thick liquids.   Pulmonology has evaluated the patient  Chest x-ray : Decreasing 4 mm left apical pneumothorax, previously 11 mm   Plan  - Continue high flow nasal cannula and wean as tolerated   - Continue Bumex IV, switch to PO tomorrow   - BiPAP only if needed  - Continue Brovana Pulmicort  Daily chest x-ray for pneumothorax  Appreciate pulmonary recommendation     # Cardiac arrest:  #PEA arrest  #Elevated trops   - PEA arrest due to respiratory status and induction.  - DINORA mild global hypokinesis - 45-50% EF.  No DVT noted in bilateral femoral veins.  - Lactic acidosis resolved.  Troponin elevation related to CPR.  Trending down.  -Cardiology has evaluated the patient  Plan  - Patient will benefit from ischemic eval, appreciate cardiology recommendations     Acute on chronic HfmrEF  EF  Left Ventricle: Mildly reduced left ventricular 
    Hospitalist Progress Note      NAME:  Tasha Kyle   :  1955  MRM:  [id number]    Date/Time: 2025  7:47 AM           Assessment / Plan:     Tasha Kyle is a 69 y.o. female with TIA, non-small cell lung cancer, HFpEF, anxiety, and asthma.  She was admitted to the ICU of this hospital on 2025 for evaluation/management of acute hypoxemic respiratory failure.  She suffered PEA arrest immediately after intubation in the ER.  She was also found have acute CVA during her admission.  Transferred to the hospitalist service on     #Acute hypoxemic respiratory failure: Present on admission, reason for admission.  Suspect acute on chronic diastolic heart failure given presence of pulmonary edema on admission imaging.  She has since been weaned to room air.  -Continue p.o. bumetanide   -Resume supplemental oxygen if needed  -Appreciate pulmonary assistance.  Patient is cleared for discharge from their standpoint  -Home O2 eval prior to discharge  -Surveillance CT as an outpatient in 6 to 8 weeks per pulmonary  -Encourage IS  -Outpatient pulmonary follow-up in 2 to 3 weeks  -Continue prednisone, last dose tomorrow    #Cardiac arrest: Occurred after intubation in the ER.  Likely secondary to respiratory status and medications given for intubation.  Cardiology previously consulted--patient had low risk nuclear stress test earlier this admission.    #Acute CVA: Punctate acute infarction at the superior left frontal lobe seen on MRI brain .  -PT/OT recommending rehab.  Patient declines rehab and even home health despite my attempts to convince her otherwise  -Continue aspirin  -DAPT previously recommended once source of bleeding was identified.  Patient denies clinically evident bleeding.  Will start clopidogrel today and plan for 21-day course  -No statin currently ordered as patient has reported allergy to these medications    #CAP  -Finishing ceftriaxone and azithromycin 
    Hospitalist Progress Note      NAME:  Tasha Kyle   :  1955  MRM:  [id number]    Date/Time: 2025  9:48 AM           Assessment / Plan:     69-year-old female with history significant for TIA, non-small cell lung cancer, anxiety, asthma, HFpEF  who presented to the emergency room with significant hypoxia, s/p cardiac arrest     Cardiopulmonary  ## Acute hypoxic and hypercapnic respiratory failure: stable post extubation 2025   #Pulmonary edema/heart failure/cardiac arrest  #Pneumothorax  Chest x-ray : Extensive bilateral patchy densities appear decreased compatible with decreasing pulmonary edema.  CT chest:    Small-moderate left pneumothorax.   Prominent bilateral lower lobe opacities which could be on the basis of  atelectasis, pneumonia, or combination.   Masslike opacity of the right upper lobe again demonstrated with interval  diminished adjacent pleural thickening along minor fissure.  Currently on 2-3 L nasal cannula  -Speech has evaluated the patient: FEES completed and full note to follow. Initiating soft and bite diet with mildly thick liquids.   Pulmonology has evaluated the patient  Chest x-ray : Decreasing 4 mm left apical pneumothorax, previously 11 mm   Plan  - Continue high flow nasal cannula and wean as tolerated   - Continue PO Bumex   - BiPAP only if needed  - Continue Brovana Pulmicort  Daily chest x-ray for pneumothorax  Appreciate pulmonary recommendation     # Cardiac arrest:  #PEA arrest  #Elevated trops   - PEA arrest due to respiratory status and induction.  - DINORA mild global hypokinesis - 45-50% EF.  No DVT noted in bilateral femoral veins.  - Lactic acidosis resolved.  Troponin elevation related to CPR.  Trending down.  -Cardiology has evaluated the patient  Plan  - Stress test today      Acute on chronic HfmrEF  EF  Left Ventricle: Mildly reduced left ventricular systolic function with a visually estimated EF of 45 - 50%. Mild global hypokinesis 
   06/18/25 0829   Weaning Parameters   Spontaneous Breathing Trial Complete No (comment)  (low vt 150's, apnic)          
  Name: [facility] Hospital: [facility]   : 1955 Admit Date: 2025   Phone: [phone]  Room: B326/01   PCP: Liliane Martin MD  MRN: [id number]   Date: 2025  Code: Full Code        HPI:    9:44 AM       History was obtained from EMR, patient. Niece is at the bedside.    I was asked by Dr. Benson to see Tashaterrance Kyle in consultation for a chief complaint of \"PEA/extubated/AHRF\".     History of Present Illness: 69 year old female with a history of TIA, non-small cell lung cancer following Dr. Scott, asthma, HFpEF, GERD, anxiety who presented by EMS with respiratory distress and severe hypoxemia in the 50's.     She tells me she had no symptoms until 25 when new onset shortness of breath developed. She denies any other symptoms- no cough/mucous, fevers or edema.     In the ED she was intubated and went into cardiac arrest with PEA in the ED. ROSC and placed on ventilator.  She was treated for hypertensive emergency and pulmonary edema. Extubated on 25 to bipap and HFNC which have been weaned off.     Echo: Mild global hypokinesis - 45-50% EF.      CXR: extensive bilateral patchy densities appear decreased compatible with decreasing pulmonary edema.     ABG pH 7.45, pCO2 43, pO2 83, HCO3 29     Chest CT without contrast: small-moderate left pneumothorax. Prominent bilateral lower lobe opacities, masslike opacity of the RUL again demonstrated with interval diminished adjacent pleural thickening along minor fissure.    : CXR this AM with smaller left apical PTX but persistent mod to severe pulm edema pattern. Cr 1.10, downtrending, WBC 12.9 downtrending. Congested. Feeling better today. On 2L satting 100%. Bringing up opague mucous but feels like it won't come out all the time.     : Afebrile. Feels that her breathing has improved.      Interval History    Afebrile  BP stable  Sats 100% on RA  WBC 11.5--increased 
  Name: [facility] Hospital: [facility]   : 1955 Admit Date: 2025   Phone: [phone]  Room: B326/01   PCP: Liliane Martin MD  MRN: [id number]   Date: 2025  Code: Full Code        HPI:    9:28 AM       History was obtained from EMR, patient. Niece is at the bedside.    I was asked by Dr. Benson to see Tashaterrance Kyle in consultation for a chief complaint of \"PEA/extubated/AHRF\".     History of Present Illness: 69 year old female with a history of TIA, non-small cell lung cancer following Dr. Scott, asthma, HFpEF, GERD, anxiety who presented by EMS with respiratory distress and severe hypoxemia in the 50's.     She tells me she had no symptoms until 25 when new onset shortness of breath developed. She denies any other symptoms- no cough/mucous, fevers or edema.     In the ED she was intubated and went into cardiac arrest with PEA in the ED. ROSC and placed on ventilator.  She was treated for hypertensive emergency and pulmonary edema. Extubated on 25 to bipap and HFNC which have been weaned off.     Echo: Mild global hypokinesis - 45-50% EF.      CXR: extensive bilateral patchy densities appear decreased compatible with decreasing pulmonary edema.     ABG pH 7.45, pCO2 43, pO2 83, HCO3 29     Chest CT without contrast: small-moderate left pneumothorax. Prominent bilateral lower lobe opacities, masslike opacity of the RUL again demonstrated with interval diminished adjacent pleural thickening along minor fissure.    : CXR this AM with smaller left apical PTX but persistent mod to severe pulm edema pattern. Cr 1.10, downtrending, WBC 12.9 downtrending. Congested. Feeling better today. On 2L satting 100%. Bringing up opague mucous but feels like it won't come out all the time.     : Afebrile. Feels that her breathing has improved.      Interval History    Afebrile  BP elevated  Sats 96% on RA  WBC 11.8--increased 
  Name: [facility] Hospital: [facility]   : 1955 Admit Date: 2025   Phone: [phone]  Room: A480/01   PCP: Liliane Martin MD  MRN: [id number]   Date: 2025  Code: Full Code        HPI:    10:31 AM       History was obtained from EMR, patient. Niece is at the bedside.    I was asked by Dr. Benson to see Tasha Qi Saroj in consultation for a chief complaint of \"PEA/extubated/AHRF\".     History of Present Illness: 69 year old female with a history of TIA, non-small cell lung cancer following Dr. Scott, asthma, HFpEF, GERD, anxiety who presented by EMS with respiratory distress and severe hypoxemia in the 50's.     She tells me she had no symptoms until 25 when new onset shortness of breath developed. She denies any other symptoms- no cough/mucous, fevers or edema.     In the ED she was intubated and went into cardiac arrest with PEA in the ED. ROSC and placed on ventilator.  She was treated for hypertensive emergency and pulmonary edema. Extubated on 25 to bipap and HFNC which have been weaned off.     Echo: Mild global hypokinesis - 45-50% EF.      CXR: extensive bilateral patchy densities appear decreased compatible with decreasing pulmonary edema.     ABG pH 7.45, pCO2 43, pO2 83, HCO3 29     Chest CT without contrast: small-moderate left pneumothorax. Prominent bilateral lower lobe opacities, masslike opacity of the RUL again demonstrated with interval diminished adjacent pleural thickening along minor fissure.    ROS: Congested cough and difficulty with sputum expectoration. No other complaints except \"tightness\" at her IV site with PRBC infusing- notified nurse. She is now on 2LNCo2.       Past Medical History:   Diagnosis Date    Anxiety     Arthritis     bilateral knees, lower back Deg. disc disease    Asthma     in 20's    Cancer (HCC)     non small cell lung cancer    GERD (gastroesophageal reflux disease)     
  Name: [facility] Hospital: [facility]   : 1955 Admit Date: 2025   Phone: [phone]  Room: B326/01   PCP: Liliane Martin MD  MRN: [id number]   Date: 2025  Code: Full Code        HPI:    12:03 PM       History was obtained from EMR, patient. Niece is at the bedside.    I was asked by Dr. Benson to see Tasha Qi Saroj in consultation for a chief complaint of \"PEA/extubated/AHRF\".     History of Present Illness: 69 year old female with a history of TIA, non-small cell lung cancer following Dr. Scott, asthma, HFpEF, GERD, anxiety who presented by EMS with respiratory distress and severe hypoxemia in the 50's.     She tells me she had no symptoms until 25 when new onset shortness of breath developed. She denies any other symptoms- no cough/mucous, fevers or edema.     In the ED she was intubated and went into cardiac arrest with PEA in the ED. ROSC and placed on ventilator.  She was treated for hypertensive emergency and pulmonary edema. Extubated on 25 to bipap and HFNC which have been weaned off.     Echo: Mild global hypokinesis - 45-50% EF.      CXR: extensive bilateral patchy densities appear decreased compatible with decreasing pulmonary edema.     ABG pH 7.45, pCO2 43, pO2 83, HCO3 29     Chest CT without contrast: small-moderate left pneumothorax. Prominent bilateral lower lobe opacities, masslike opacity of the RUL again demonstrated with interval diminished adjacent pleural thickening along minor fissure.    : CXR this AM with smaller left apical PTX but persistent mod to severe pulm edema pattern. Cr 1.10, downtrending, WBC 12.9 downtrending. Congested. Feeling better today. On 2L satting 100%. Bringing up opague mucous but feels like it won't come out all the time.     : Afebrile. Feels that her breathing has improved.       Past Medical History:   Diagnosis Date    Anxiety     Arthritis     bilateral knees, 
  Name: [facility] Hospital: [facility]   : 1955 Admit Date: 2025   Phone: [phone]  Room: B326/01   PCP: Liliane Martin MD  MRN: [id number]   Date: 2025  Code: Full Code        HPI:    10:10 AM       History was obtained from EMR, patient. Niece is at the bedside.    I was asked by Dr. Benson to see Tasha Qi Saroj in consultation for a chief complaint of \"PEA/extubated/AHRF\".     History of Present Illness: 69 year old female with a history of TIA, non-small cell lung cancer following Dr. Scott, asthma, HFpEF, GERD, anxiety who presented by EMS with respiratory distress and severe hypoxemia in the 50's.     She tells me she had no symptoms until 25 when new onset shortness of breath developed. She denies any other symptoms- no cough/mucous, fevers or edema.     In the ED she was intubated and went into cardiac arrest with PEA in the ED. ROSC and placed on ventilator.  She was treated for hypertensive emergency and pulmonary edema. Extubated on 25 to bipap and HFNC which have been weaned off.     Echo: Mild global hypokinesis - 45-50% EF.      CXR: extensive bilateral patchy densities appear decreased compatible with decreasing pulmonary edema.     ABG pH 7.45, pCO2 43, pO2 83, HCO3 29     Chest CT without contrast: small-moderate left pneumothorax. Prominent bilateral lower lobe opacities, masslike opacity of the RUL again demonstrated with interval diminished adjacent pleural thickening along minor fissure.    : CXR this AM with smaller left apical PTX but persistent mod to severe pulm edema pattern. Cr 1.10, downtrending, WBC 12.9 downtrending. Congested. Feeling better today. On 2L satting 100%. Bringing up opague mucous but feels like it won't come out all the time.       Past Medical History:   Diagnosis Date    Anxiety     Arthritis     bilateral knees, lower back Deg. disc disease    Asthma     in     Cancer 
1411hrs-Pt extubated to Hi-flow 40L 50%   1418hrs-Bilateral soft restraints discontinued.  
1630: TRANSFER - IN REPORT:    Verbal report received from Wilda SHIELDS on Tasha Kyle  being received from ICU for routine progression of patient care      Report consisted of patient's Situation, Background, Assessment and   Recommendations(SBAR).     Information from the following report(s) Nurse Handoff Report was reviewed with the receiving nurse.    Opportunity for questions and clarification was provided.      Assessment completed upon patient's arrival to unit and care assumed.     1753: IV Vancomycin paused due to loss of IV access on L arm. Zosyn currently running through R arm PIV.  Unable to Y site ABX due to incompatibility.      1845: Contacted dynamic access team for midline on patient     1900: Bedside and Verbal shift change report given to Joanne SHIELDS (oncoming nurse) by Mariel SHIELDS (offgoing nurse). Report included the following information Nurse Handoff Report.     
Brief ICU Nutrition Assessment    Type and Reason for Visit: Reassess    Nutrition Recommendations/Plan:   Brief follow up. Extubated, OGT appropriately removed. NPO at this time. Recommend YSS and diet as tolerated. Updated needs per below.    Last BM:  (PTA)  Edema: Right lower extremity, Left lower extremity            RLE Edema: +2  LLE Edema: +2    Nutr. Labs:    Lab Results   Component Value Date    CREATININE 1.45 (H) 06/19/2025    BUN 22 (H) 06/19/2025     06/19/2025    K 3.6 06/19/2025     (H) 06/19/2025    CO2 32 06/19/2025       Lab Results   Component Value Date/Time    POCGLU 386 06/17/2025 04:19 PM        Hemoglobin A1C   Date Value Ref Range Status   10/30/2019 6.8 (H) 4.8 - 5.6 % Final     Comment:              Prediabetes: 5.7 - 6.4           Diabetes: >6.4           Glycemic control for adults with diabetes: <7.0       Hemoglobin A1C, POC   Date Value Ref Range Status   07/26/2019 7.2 % Final       Lab Results   Component Value Date/Time    MG 1.8 06/19/2025 04:41 AM       Lab Results   Component Value Date    CALCIUM 8.7 06/19/2025       Lab Results   Component Value Date    TRIG 111 10/30/2019    TRIG 116 07/26/2019       Nutr. Meds:  Scheduled Meds:   bumetanide  2 mg IntraVENous Daily    potassium bicarb-citric acid  40 mEq Oral Once    budesonide  0.5 mg Nebulization BID RT    albuterol  2.5 mg Nebulization 4x Daily RT    piperacillin-tazobactam  3,375 mg IntraVENous Q8H    vancomycin  1,000 mg IntraVENous Q24H    sodium chloride flush  5-40 mL IntraVENous 2 times per day    enoxaparin  40 mg SubCUTAneous Daily    aspirin  81 mg Oral Daily     Continuous Infusions:   fentaNYL Stopped (06/18/25 1013)    dexmedeTOMIDine 1 mcg/kg/hr (06/19/25 0306)    sodium chloride      niCARdipine Stopped (06/17/25 1719)    nitroGLYCERIN Stopped (06/17/25 1716)    propofol Stopped (06/18/25 1311)    norepinephrine Stopped (06/18/25 1448)     PRN Meds: fentaNYL **AND** fentaNYL, sodium chloride 
Brief ID Note:    Appears that patient is improving clinically without treatment of ESBL E coli growing on urine culture.    No need to treat it.    Treat PNA as you are.    Does need contact isolation.    D/w Dr. Benson.  
Comprehensive Nutrition Assessment    Type and Reason for Visit:  Initial    Nutrition Recommendations/Plan:   YSS if extubated and diet as tolerated    If remaining intubated, begin trophic feeds:  Trickle Feeds Vital HP @ 20 mL/hour  FWF 80 mL q 3 hours  Provide 3 Prosource/day, flush with 15 mL H2O before and after     Malnutrition Assessment:  Malnutrition Status:  At risk for malnutrition (fluid may not be related to malnutrition) (06/18/25 1201)    Context:  Acute Illness     Findings of the 6 clinical characteristics of malnutrition:  Energy Intake:  No decrease in energy intake  Weight Loss:  No weight loss     Body Fat Loss:  Mild body fat loss Triceps   Muscle Mass Loss:  Unable to assess    Fluid Accumulation:  Moderate to Severe Generalized   Strength:  Not Performed    Nutrition Assessment:     Patient is a 69 year old female admitted with Cardiac arrest (HCC) [I46.9]  Flash pulmonary edema (HCC) [J81.0]  Hypertensive emergency [I16.1]  Acute respiratory failure with hypoxia and hypercapnia (HCC) [J96.01, J96.02]. She  has a past medical history of Anxiety, Arthritis, Asthma, Cancer (HCC), GERD (gastroesophageal reflux disease), Neurological disorder, Other ill-defined conditions(799.89), and Stroke ([facility]).  Intubated, OGT clamped. 150 mL out from OGT in last 24 hours. S/p PEA with three rounds of CPR. Currently afebrile. Requiring precedex @ 0.4 mcg/kg/hr, levo @ 6 mcg/min, and propofol @ 25 mcg/kg/min. Propofol providing 340 kcal/day.  Patient was apneic on SBT this AM but followed commands briefly. Lack of weight history available in chart review, but appears down 11# (5.4%) x 3 months, not clinically significant for timeframe. BLE edema occluding any muscle/fat wasting.     Trickle feeds at goal 20 mL/hour provide 440 kcal (+ Prosource, + Propofol, 1020 kcal, 100% needs), 48 g carbs, 36 g protein (+ 3 Prosource, 96 g, 96% needs). TF + FWF provides 1007 mL H2O/day.       Wt Readings from Last 10 
[facility] Pharm[facility] Dosing Services: Antimicrobial Stewardship Daily Doc 6/20/2025  Consult for antibiotic dosing of vancomycin by Dr. Benson  Indication: nosocomial PNA s/p cardiac arrest in s/o NSCLC  Day of Therapy: 3 of 7    Ht Readings from Last 1 Encounters:   06/18/25 1.575 m (5' 2\")        Wt Readings from Last 1 Encounters:   06/20/25 96.3 kg (212 lb 4.9 oz)      Vancomycin therapy:  Loading dose: Vancomycin 2250 mg x1 dose given 6/18 @ 1816  Maintenance dose: Vancomycin 1000 mg IV every 24 hours   Dose calculated to approximate a           a. Target AUC/KEVIN of 400-600          b. Trough of 15-20 mcg/mL   Last level: 21.5 mcg/ml (drawn 6 hrs 23 min post dose - )  Plan:  Scr 1.44, CHASE ongoing but Scr plateaued (baseline 0.6-0.7 from 3/2025).  UOP 0.5-0.6 ml/kg/hr.   Persistent leukocytosis, afebrile past 24 hrs  Re-consulted for vancomycin dosing at least until MRSA screening is finalized - per consult can DC if negative  Level is WNL. Continue daily BMP. Continue vancomycin 1000 mg IV Q24H.   Pharmacy Note: Vanc was dc'd during rounds by Shon since most likely aspiration and he wanted to just continue Zosyn. Re-consulted by Kelley to resume vanc until MRSA screening has finalized. Hopefully can dc tomorrow if clear.     Dose administration notes: Doses given appropriately as scheduled    Other Antimicrobial   (not dosed by pharmacist) Zosyn 3.375 gm IV Q8H x 5 days (thru 6/23)   Cultures 6/19 Bloodx2: NGTD, Prelim  6/19 Urine: UA appears contaminated. WBC neg. Cx pend.  6/19 MRSA nares: Pending  6/19 RVP: Negative   Serum Creatinine Lab Results   Component Value Date/Time    CREATININE 1.43 06/20/2025 12:05 AM      Creatinine Clearance Estimated Creatinine Clearance: 40 mL/min (A) (based on SCr of 1.43 mg/dL (H)).     Temp Temp: 98.6 °F (37 °C) (Oral)       WBC Lab Results   Component Value Date/Time    WBC 16.6 06/20/2025 12:05 AM      Procalcitonin Lab Results   Component Value Date/Time    PROCAL 
[facility] Dosing Services: Antimicrobial Stewardship Daily Doc  Consult for antibiotic dosing of vancomycin by Dr. Menendez  Indication: nosocomial PNA s/p cardiac arrest in s/o NSCLC  Day of Therapy: 1 of 7    Ht Readings from Last 1 Encounters:   06/18/25 1.575 m (5' 2\")        Wt Readings from Last 1 Encounters:   06/18/25 86.2 kg (190 lb)      Vancomycin therapy:  Loading dose: Vancomycin 2250 mg x1 dose now  Maintenance dose: Vancomycin 1000 mg IV every 24 hours   Dose calculated to approximate a           a. Target AUC/KEVIN of 400-600          b. Trough of 15-20 mcg/mL   Last level: n/a  Plan:  Regimen above predicts  , Tr 14.3 mg/L, T1/2 = 15 hr per Bayesian kinetics calculations   Scr 1.41 is above apparent baseline 0.6-0.7 from March 2025. Will monitor Scr closely. Consider dosing by level if Scr worsens. Daily BMP is ordered by provider.     Dose administration notes: new start    Other Antimicrobial   (not dosed by pharmacist) Zosyn (renally adjusted)   Cultures pending   Serum Creatinine Lab Results   Component Value Date/Time    CREATININE 1.41 06/18/2025 12:33 PM      Creatinine Clearance Estimated Creatinine Clearance: 38 mL/min (A) (based on SCr of 1.41 mg/dL (H)).     Temp Temp: 100.2 °F (37.9 °C) (Axillary)       WBC Lab Results   Component Value Date/Time    WBC 22.5 06/18/2025 03:17 AM      Procalcitonin Lab Results   Component Value Date/Time    PROCAL 17.33 06/18/2025 04:30 PM      For Antifungals, Metronidazole and Nafcillin: Lab Results   Component Value Date/Time    ALT 73 06/18/2025 03:17 AM    AST 86 06/18/2025 03:17 AM        Thank you,  Chon Brand, Pharm D, BCPS  [phone]      
MD notified MRI results are back.  
[facility] Pharm[facility] Dosing Services: Antimicrobial Stewardship Daily Doc  Consult for antibiotic dosing of vancomycin by Dr. Benson  Indication: nosocomial PNA s/p cardiac arrest in s/o NSCLC  Day of Therapy: 2 of 7    Ht Readings from Last 1 Encounters:   06/18/25 1.575 m (5' 2\")        Wt Readings from Last 1 Encounters:   06/18/25 86.2 kg (190 lb)      Vancomycin therapy:  Loading dose: Vancomycin 2250 mg x1 dose given 6/18 @ 1816  Maintenance dose: Vancomycin 1000 mg IV every 24 hours   Dose calculated to approximate a           a. Target AUC/KEVIN of 400-600          b. Trough of 15-20 mcg/mL   Last level: n/a  Plan:  Scr 1.45 (1.41 yesterday) still appears to be above baseline 0.6-0.7 from March 2025. Will monitor Scr closely.   WBC 14.1 (down from 22.5); afebrile currently but has had some low grade fevers previously; MRSA screening in process  Re-consulted for vancomycin dosing at least until MRSA screening is finalized  Continue vancomycin 1000 mg IV Q24H   Predicts . Consider dosing by level if Scr worsens.   Will order level 6/20 with AM labs for evaluation  Daily BMP is ordered by provider.    Dose administration notes: Doses given appropriately as scheduled    Other Antimicrobial   (not dosed by pharmacist) Zosyn 3.375 gm IV Q8H x 5 days   Cultures 6/19 MRSA nares - in process   Serum Creatinine Lab Results   Component Value Date/Time    CREATININE 1.45 06/19/2025 04:41 AM      Creatinine Clearance Estimated Creatinine Clearance: 37 mL/min (A) (based on SCr of 1.45 mg/dL (H)).     Temp Temp: 97.8 °F (36.6 °C) (Oral)       WBC Lab Results   Component Value Date/Time    WBC 14.1 06/19/2025 04:41 AM      Procalcitonin Lab Results   Component Value Date/Time    PROCAL 17.33 06/18/2025 04:30 PM      For Antifungals, Metronidazole and Nafcillin: Lab Results   Component Value Date/Time    ALT 54 06/19/2025 04:41 AM    AST 45 06/19/2025 04:41 AM        Thank you,  Chon Brand, Pharm D, BCPS  [phone]  
Nurse handed patient a copy of discharge instructions which have been read and explained to patient. New medications read and explained. Patient verbalized understanding. Patient aware that prescriptions have been electronically sent to pharmacy downstairs. Opportunity for questions and clarification offered. Removed patients IV access with no complications, VS stable, and patient sent with all belongings.  
Pharmacy Dosing Services: Zosyn    Zosyn 3.375 gm IV Q6H automatically adjusted per protocol to Zosyn 4.5 gm IV x 1 over 30 min followed by Zosyn 3.375 gm IV Q8H (extended-infusion over 4 hours) for CrCl >/= 20 mL/min  Maintenance dose scheduled to begin 6 hours after load for CrCl >/= 20 mL/min and 8 hours after load for CrCl < 20 mL/min    Thank you,  Chon Brand, Pharm D, BCPS  [phone]    
Pt ordered for Home O2 eval.  Pt is a high fall risk and being followed by PT/OT.  Will coordinate with therapy to assess O2 needs.  
RRT RN responded to Code Blue at 1611. See Code Blue Narrator. ROSC obtained. Patient transported to ICU room 480.   
Received a critical lab result from [place]. Pt has ESBL in urine. Dr Benson notified.   
Speech Language Pathology  Flexible Endoscopic Evaluation of Swallowing-FEES  Patient: Tasha Kyle (69 y.o. female)  Date: 6/24/2025  Primary Diagnosis: Cardiac arrest (HCC) [I46.9]  Flash pulmonary edema (HCC) [J81.0]  Hypertensive emergency [I16.1]  Acute respiratory failure with hypoxia and hypercapnia (HCC) [J96.01, J96.02]       Precautions:   Fall Risk                  ASSESSMENT :  Patient with much improved swallowing for thins at this time. She has mild delay in swallowing at this time.    Ready for diet upgrade to regular, thins.   She still has some L vocal cord hypomobility/discoordination.     Patient will benefit from skilled intervention to address the above impairments.     PLAN :  Recommendations and Planned Interventions:  Diet: Regular and thin liquids  Upright for all PO during and after for 30 min      Acute SLP Services: Yes, patient will be followed by speech-language pathology 3x/week to address goals. Patient's rehabilitation potential is considered to be Good.  Discharge Recommendations: No, additional SLP treatment not indicated at discharge     SUBJECTIVE:   Patient chewing gum when SLP entered room    OBJECTIVE:       Past Medical History:   Diagnosis Date    Anxiety     Arthritis     bilateral knees, lower back Deg. disc disease    Asthma     in 20's    Cancer (HCC)     non small cell lung cancer    GERD (gastroesophageal reflux disease)     Neurological disorder     stroke    Other ill-defined conditions(799.89)     high cholesterol    Stroke ([facility]) 7/2004    TIA clot in right temperal artery residual left sided weakness     Past Surgical History:   Procedure Laterality Date    CATARACT REMOVAL  2005    CHOLECYSTECTOMY      OTHER SURGICAL HISTORY  2005    dental (gum) surgery    TONSILLECTOMY  1980's    WISDOM TOOTH EXTRACTION  1990's     Prior Level of Function/Home Situation:   Social/Functional History  Lives With: Alone  Type of Home: House  Home Layout: One level  Home 
Speech Therapy    FEES completed and full note to follow. Initiating soft and bite diet with mildly thick liquids.    Sari Ferrari M.S., CCC-SLP    
Spiritual Health History and Assessment/Progress Note  [facility]    Follow-up, Code Blue, Anticipatory Grief,      Name: Tasha Kyle MRN: [id number]    Age: 69 y.o.     Sex: female   Language: English   Alevism: Adventism   Cardiac arrest (HCC)     Date: 6/19/2025            Total Time Calculated: 27 min              Spiritual Assessment continued in [facility] A4 INTENSIVE CARE UNIT        Referral/Consult From: Other    Encounter Overview/Reason: Follow-up  Service Provided For: Patient and family together, Friend    Bree, Belief, Meaning:   Patient identifies as spiritual, is connected with a bree tradition or spiritual practice, and has beliefs or practices that help with coping during difficult times  Family/Friends identify as spiritual, are connected with a bree tradition or spiritual practice, and have beliefs or practices that help with coping during difficult times      Importance and Influence:  Patient has spiritual/personal beliefs that influence decisions regarding their health  Family/Friends have spiritual/personal beliefs that influence decisions regarding the patient's health    Community:  Patient is connected with a spiritual community and feels well-supported. Support system includes: Bree Community, Friends, and Extended family  Family/Friends are connected with a spiritual community: and feel well-supported. Support system includes: Bree Community, Friends, and Extended family    Assessment and Plan of Care:   Updated by Chaplain Andrade and visited Pt bedside today for follow up and spiritual care assessment. Ms Kyle had been extubated and talking, saying she feels much better and grateful for all the medical care. She has 3 visitors present, her niece, sister and friends. Ms Kyle says she has a home Muslim for spiritual and prayer support, and grateful for  support.       Patient Interventions include: Facilitated expression of thoughts and feelings, 
Spiritual Health History and Assessment/Progress Note  [facility]    Crisis, Code Blue, Anticipatory Grief,      Name: Tasha Kyle MRN: [id number]    Age: 69 y.o.     Sex: female   Language: English   Samaritan: Jew   Cardiac arrest (HCC)     Date: 6/17/2025            Total Time Calculated: 75 min              Spiritual Assessment began in [facility] A4 INTENSIVE CARE UNIT        Referral/Consult From: Multi-disciplinary team   Encounter Overview/Reason: Crisis  Service Provided For: Patient, Friend    Bree, Belief, Meaning:   Patient unable to assess at this time  Family/Friends have beliefs or practices that help with coping during difficult times      Importance and Influence:  Patient unable to assess at this time  Family/Friends have spiritual/personal beliefs that influence decisions regarding the patient's health    Community:  Patient Other: unable to assess  Family/Friends feel well-supported. Support system includes: Friends    Assessment and Plan of Care:    paged for Code Blue in the ER. Pt was being attended to by medical staff. Pt's friend, Tracy Costa, was present in the ER waiting room. Friend shared she holds a secret for a friend, that pt's family does not know she has cancer, except possibly pt's youngest sister, Iman. Friend has been taking the pt to her appointments at [facility], and was with her when EMS was called to [facility].    Friend called family to alert them that their sister is in ICU at [facility].  alerted nurses to friends and family expected arrival in the waiting room, asking that they notify the family when the pt can be seen, even briefly.     Shared  availability with the staff and friend.    Patient Interventions include: Other: see note  Family/Friends Interventions include: Facilitated expression of thoughts and feelings, Affirmed coping skills/support systems, and Other: supportive presence for decision making    Patient Plan of Care: 
Verbal order for pt to transport to MRI without TELE.  MRI made aware  
  06/22/25 1800 51 - 75%   06/22/25 1300 51 - 75%   06/22/25 0950 51 - 75%     Supplement Intake:  No data found.  Nutrition Support: none      Anthropometric Measures:  Height: 157.5 cm (5' 2\")  Ideal Body Weight (IBW): 110 lbs (50 kg)       Current Body Weight: 93.7 kg (206 lb 9.1 oz), 187.8 % IBW. Weight Source: Bed scale  Current BMI (kg/m2): 37.8        Weight Adjustment For: No Adjustment                 BMI Categories: Obese Class 2 (BMI 35.0 -39.9)    Wt Readings from Last 10 Encounters:   06/24/25 93.7 kg (206 lb 8 oz)   06/23/25 95.1 kg (209 lb 9.6 oz)   03/18/25 91.2 kg (201 lb)   04/04/24 100.2 kg (221 lb)           Nutrition Diagnosis:   No nutrition diagnosis at this time     Nutrition Interventions:   Food and/or Nutrient Delivery: Continue Current Diet  Nutrition Education/Counseling: No recommendation at this time  Coordination of Nutrition Care: Continue to monitor while inpatient, Interdisciplinary Rounds  Plan of Care discussed with: IDR team    Goals:     Goals: Meet at least 75% of estimated needs, by next RD assessment       Nutrition Monitoring and Evaluation:   Behavioral-Environmental Outcomes: None Identified  Food/Nutrient Intake Outcomes: Food and Nutrient Intake  Physical Signs/Symptoms Outcomes: Biochemical Data, GI Status, Weight    Discharge Planning:    Continue current diet     Meera Ang RD  Available via [employer]    
guaiFENesin (MUCINEX) extended release tablet 1,200 mg, 1,200 mg, Oral, BID, Karmen Morrison APRN - NP, 1,200 mg at 06/22/25 0903    metoprolol succinate (TOPROL XL) extended release tablet 25 mg, 25 mg, Oral, Daily, Tita Sandhu APRN - NP, 25 mg at 06/22/25 0903    sodium chloride flush 0.9 % injection 5-40 mL, 5-40 mL, IntraVENous, 2 times per day, Jason Benson MD, 10 mL at 06/22/25 0912    sodium chloride flush 0.9 % injection 5-40 mL, 5-40 mL, IntraVENous, PRN, Jason Benson MD    0.9 % sodium chloride infusion, , IntraVENous, PRN, Jason Benson MD    hydrocortisone 1 % cream, , Topical, 4x Daily PRN, Adry Florian APRN - NP, Given at 06/20/25 2238    potassium bicarb-citric acid (EFFER-K) effervescent tablet 40 mEq, 40 mEq, Oral, Once, Serg Menendez MD    melatonin tablet 3 mg, 3 mg, Oral, Nightly, Serg Menendez MD, 3 mg at 06/21/25 2128    arformoterol tartrate (BROVANA) nebulizer solution 15 mcg, 15 mcg, Nebulization, BID RT, Jason Benson MD, 15 mcg at 06/22/25 0759    budesonide (PULMICORT) nebulizer suspension 500 mcg, 0.5 mg, Nebulization, BID RT, Serg Menendez MD, 500 mcg at 06/22/25 0759    0.9 % sodium chloride infusion, , IntraVENous, PRN, Serg Menendez MD    potassium chloride 20 mEq/50 mL IVPB (Central Line), 20 mEq, IntraVENous, PRN **OR** potassium chloride 10 mEq/100 mL IVPB (Peripheral Line), 10 mEq, IntraVENous, PRN, Serg Menendez MD, Stopped at 06/18/25 2104    magnesium sulfate 2000 mg in 50 mL IVPB premix, 2,000 mg, IntraVENous, PRN, Serg Menendez MD    ondansetron (ZOFRAN-ODT) disintegrating tablet 4 mg, 4 mg, Oral, Q8H PRN **OR** ondansetron (ZOFRAN) injection 4 mg, 4 mg, IntraVENous, Q6H PRN, Serg Menendez MD    polyethylene glycol (GLYCOLAX) packet 17 g, 17 g, Oral, Daily PRN, Serg Menendez MD    acetaminophen (TYLENOL) tablet 650 mg, 650 mg, Oral, Q6H PRN **OR** acetaminophen (TYLENOL) suppository 650 mg, 650 mg, Rectal, Q6H PRN, Serg Menendez MD    enoxaparin 
needed.    Provider, MD Barry   aspirin 81 MG EC tablet Take 2 tablets by mouth daily    Provider, MD Barry   Ferrous Sulfate (IRON) 325 (65 Fe) MG TABS Take 325 mg by mouth daily 4/4/24 3/19/25  Uriel Gómez,          Allergies/Social/Family History:     Allergies   Allergen Reactions    Statins Rash      Social History     Tobacco Use    Smoking status: Former     Current packs/day: 0.00     Types: Cigarettes     Quit date: 2004     Years since quittin.9    Smokeless tobacco: Never   Substance Use Topics    Alcohol use: No      No family history on file.      LABS AND  DATA:   Reviewed          CRITICAL CARE CONSULTANT NOTE  I had a face to face encounter with the patient, reviewed and interpreted patient data including clinical events, labs, images, vital signs, I/O's, and examined patient.  I have discussed the case and the plan and management of the patient's care with the consulting services, the bedside nurses and the respiratory therapist.      NOTE OF PERSONAL INVOLVEMENT IN CARE   This patient has a high probability of imminent, clinically significant deterioration, which requires the highest level of preparedness to intervene urgently. I participated in the decision-making and personally managed or directed the management of the following life and organ supporting interventions that required my frequent assessment to treat or prevent imminent deterioration.      Serg Menendez MD   Pulmonary/[facility]  [phone]  2025

## 2025-06-25 NOTE — DISCHARGE INSTRUCTIONS
HOSPITALIST DISCHARGE INSTRUCTIONS  NAME:  Tasha Kyle   :  1955   MRN:  [id number]     Date/Time:  2025 12:00 PM    ADMIT DATE: 2025     DISCHARGE DATE: 2025     DISCHARGE DIAGNOSIS:  Cardiac arrest  Pneumonia  Acute stroke  Congestive heart failure    DISCHARGE INSTRUCTIONS:  Thank you for allowing us to participate in your care. Your discharging Hospitalist is Brandt Woodard MD. You were admitted for evaluation and treatment of the above.  You initially came to the hospital with extra fluid on your lungs secondary to congestive heart failure.  This caused you to need to be put on the breathing machine.  The stress from putting her body on the breathing machine major heart temporarily stopped, but it was quickly brought back.  Your heart was found to be weak so we put you on some medications (which are continuing at home) to try to improve the heart strength.  You should follow-up with a cardiologist in several weeks to make sure that your heart function returns to normal.  You were found to have a stroke so you should take aspirin every day for the foreseeable future and clopidogrel or Plavix every day for the next 19 days.  You completed antibiotics for pneumonia.    MEDICATIONS:    It is important that you take the medication exactly as they are prescribed.   Keep your medication in the bottles provided by the pharmacist and keep a list of the medication names, dosages, and times to be taken in your wallet.   Do not take other medications without consulting your doctor.             If you experience any of the following symptoms then please call your primary care physician or return to the emergency room if you cannot get hold of your doctor:  Fever, chills, nausea, vomiting, diarrhea, change in mentation, falling, bleeding, shortness of breath    Follow Up:  Please call the below provider to arrange hospital follow up appointment      Itzel Oquendo, APRN - NP  [id number]

## 2025-06-25 NOTE — DISCHARGE SUMMARY
mouth every 2 hours as needed for Sore Throat     budesonide-formoterol 160-4.5 MCG/ACT Aero  Commonly known as: SYMBICORT  Inhale 2 puffs into the lungs in the morning and 2 puffs in the evening.     Iron 325 (65 Fe) MG Tabs  Take 325 mg by mouth daily     zolpidem 10 MG tablet  Commonly known as: AMBIEN            STOP taking these medications      furosemide 40 MG tablet  Commonly known as: LASIX               Where to Get Your Medications        These medications were sent to [id number] - [place], [place] - [address] [address]  [address] - P [phone] - F [phone]  [address], [facility] [address]      Phone: [phone]   bumetanide 2 MG tablet  clopidogrel 75 MG tablet  metoprolol succinate 25 MG extended release tablet  sacubitril-valsartan 24-26 MG per tablet  spironolactone 25 MG tablet       Current Discharge Medication List        CONTINUE these medications which have NOT CHANGED    Details   budesonide-formoterol (SYMBICORT) 160-4.5 MCG/ACT AERO Inhale 2 puffs into the lungs in the morning and 2 puffs in the evening.  Qty: 2 each, Refills: 0      albuterol sulfate HFA (VENTOLIN HFA) 108 (90 Base) MCG/ACT inhaler Inhale 2 puffs into the lungs 4 times daily as needed for Wheezing  Qty: 18 g, Refills: 0      Benzocaine-Menthol (CEPACOL) 6-10 MG LOZG lozenge Take 1 lozenge by mouth every 2 hours as needed for Sore Throat  Qty: 30 lozenge, Refills: 0      zolpidem (AMBIEN) 10 MG tablet Take 1 tablet by mouth nightly as needed.      ALPRAZolam (XANAX) 0.5 MG tablet Take 1 tablet by mouth 3 times daily as needed.      aspirin 81 MG EC tablet Take 2 tablets by mouth daily      Ferrous Sulfate (IRON) 325 (65 Fe) MG TABS Take 325 mg by mouth daily  Qty: 30 tablet, Refills: 2              Activity: activity as tolerated  Diet: Low-sodium    Follow-up with Liliane Martin MD within 2 weeks  Follow-up tests/labs: TTE in several weeks    Approximate time spent in

## 2025-06-25 NOTE — CARE COORDINATION
5:01 PM  CM notified Pt does not meet qualifications for home O2 at this time.     No further discharge needs indicated at this time. Pt is cleared from CM standpoint.           1:53 PM  Per chart review, Pt has declined IPR recommendation. [facility]. Pt will need to be assessed for home O2 prior to discharge.        AIDEN Cohen, CM  [facility] Care Manager  [phone]

## 2025-07-25 PROBLEM — R79.89 ELEVATED TROPONIN: Status: RESOLVED | Noted: 2025-06-20 | Resolved: 2025-07-25
